# Patient Record
Sex: MALE | Race: BLACK OR AFRICAN AMERICAN | Employment: OTHER | URBAN - METROPOLITAN AREA
[De-identification: names, ages, dates, MRNs, and addresses within clinical notes are randomized per-mention and may not be internally consistent; named-entity substitution may affect disease eponyms.]

---

## 2017-04-05 ENCOUNTER — APPOINTMENT (OUTPATIENT)
Dept: CT IMAGING | Facility: HOSPITAL | Age: 72
DRG: 871 | End: 2017-04-05
Attending: EMERGENCY MEDICINE
Payer: MEDICARE

## 2017-04-05 ENCOUNTER — HOSPITAL ENCOUNTER (INPATIENT)
Facility: HOSPITAL | Age: 72
LOS: 7 days | Discharge: HOME | DRG: 871 | End: 2017-04-12
Attending: EMERGENCY MEDICINE | Admitting: HOSPITALIST
Payer: MEDICARE

## 2017-04-05 ENCOUNTER — APPOINTMENT (OUTPATIENT)
Dept: GENERAL RADIOLOGY | Facility: HOSPITAL | Age: 72
DRG: 871 | End: 2017-04-05
Attending: EMERGENCY MEDICINE
Payer: MEDICARE

## 2017-04-05 DIAGNOSIS — J11.1 INFLUENZA-LIKE ILLNESS: ICD-10-CM

## 2017-04-05 DIAGNOSIS — G93.40 ENCEPHALOPATHY: Primary | ICD-10-CM

## 2017-04-05 DIAGNOSIS — R50.9 FEBRILE ILLNESS, ACUTE: ICD-10-CM

## 2017-04-05 PROBLEM — R73.9 HYPERGLYCEMIA: Status: ACTIVE | Noted: 2017-04-05

## 2017-04-05 PROBLEM — E87.1 HYPONATREMIA: Status: ACTIVE | Noted: 2017-04-05

## 2017-04-05 PROBLEM — E87.20 METABOLIC ACIDOSIS: Status: ACTIVE | Noted: 2017-04-05

## 2017-04-05 PROBLEM — E87.2 METABOLIC ACIDOSIS: Status: ACTIVE | Noted: 2017-04-05

## 2017-04-05 PROCEDURE — 99222 1ST HOSP IP/OBS MODERATE 55: CPT | Performed by: HOSPITALIST

## 2017-04-05 PROCEDURE — 70450 CT HEAD/BRAIN W/O DYE: CPT

## 2017-04-05 PROCEDURE — 71010 XR CHEST AP PORTABLE  (CPT=71010): CPT

## 2017-04-05 RX ORDER — MULTIPLE VITAMINS W/ MINERALS TAB 9MG-400MCG
1 TAB ORAL DAILY
Status: DISCONTINUED | OUTPATIENT
Start: 2017-04-05 | End: 2017-04-12

## 2017-04-05 RX ORDER — DEXTROSE MONOHYDRATE 25 G/50ML
50 INJECTION, SOLUTION INTRAVENOUS AS NEEDED
Status: DISCONTINUED | OUTPATIENT
Start: 2017-04-05 | End: 2017-04-09

## 2017-04-05 RX ORDER — ALFUZOSIN HYDROCHLORIDE 10 MG/1
10 TABLET, EXTENDED RELEASE ORAL
Status: DISCONTINUED | OUTPATIENT
Start: 2017-04-06 | End: 2017-04-12

## 2017-04-05 RX ORDER — BISACODYL 10 MG
10 SUPPOSITORY, RECTAL RECTAL
Status: DISCONTINUED | OUTPATIENT
Start: 2017-04-05 | End: 2017-04-12

## 2017-04-05 RX ORDER — DOCUSATE SODIUM 100 MG/1
100 CAPSULE, LIQUID FILLED ORAL 2 TIMES DAILY
Status: DISCONTINUED | OUTPATIENT
Start: 2017-04-05 | End: 2017-04-12

## 2017-04-05 RX ORDER — CLOPIDOGREL BISULFATE 75 MG/1
75 TABLET ORAL DAILY
Status: DISCONTINUED | OUTPATIENT
Start: 2017-04-05 | End: 2017-04-12

## 2017-04-05 RX ORDER — ALLOPURINOL 300 MG/1
300 TABLET ORAL DAILY
Status: ON HOLD | COMMUNITY
End: 2017-08-09

## 2017-04-05 RX ORDER — SODIUM PHOSPHATE, DIBASIC AND SODIUM PHOSPHATE, MONOBASIC 7; 19 G/133ML; G/133ML
1 ENEMA RECTAL ONCE AS NEEDED
Status: ACTIVE | OUTPATIENT
Start: 2017-04-05 | End: 2017-04-05

## 2017-04-05 RX ORDER — NITROGLYCERIN 0.4 MG/1
0.4 TABLET SUBLINGUAL EVERY 5 MIN PRN
Status: DISCONTINUED | OUTPATIENT
Start: 2017-04-05 | End: 2017-04-12

## 2017-04-05 RX ORDER — HYDROCODONE BITARTRATE AND ACETAMINOPHEN 5; 325 MG/1; MG/1
1 TABLET ORAL EVERY 8 HOURS PRN
Status: ON HOLD | COMMUNITY
End: 2021-09-07

## 2017-04-05 RX ORDER — ONDANSETRON 2 MG/ML
4 INJECTION INTRAMUSCULAR; INTRAVENOUS EVERY 6 HOURS PRN
Status: DISCONTINUED | OUTPATIENT
Start: 2017-04-05 | End: 2017-04-12

## 2017-04-05 RX ORDER — 0.9 % SODIUM CHLORIDE 0.9 %
VIAL (ML) INJECTION
Status: COMPLETED
Start: 2017-04-05 | End: 2017-04-05

## 2017-04-05 RX ORDER — DEXTROSE MONOHYDRATE 25 G/50ML
INJECTION, SOLUTION INTRAVENOUS
Status: COMPLETED
Start: 2017-04-05 | End: 2017-04-05

## 2017-04-05 RX ORDER — SODIUM CHLORIDE 9 MG/ML
INJECTION, SOLUTION INTRAVENOUS CONTINUOUS
Status: DISCONTINUED | OUTPATIENT
Start: 2017-04-05 | End: 2017-04-07 | Stop reason: ALTCHOICE

## 2017-04-05 RX ORDER — OSELTAMIVIR PHOSPHATE 75 MG/1
75 CAPSULE ORAL 2 TIMES DAILY
Status: DISCONTINUED | OUTPATIENT
Start: 2017-04-05 | End: 2017-04-11

## 2017-04-05 RX ORDER — FAMOTIDINE 20 MG/1
20 TABLET ORAL 2 TIMES DAILY
Status: ON HOLD | COMMUNITY
End: 2021-09-07

## 2017-04-05 RX ORDER — SODIUM CHLORIDE 9 MG/ML
INJECTION, SOLUTION INTRAVENOUS ONCE
Status: COMPLETED | OUTPATIENT
Start: 2017-04-05 | End: 2017-04-05

## 2017-04-05 RX ORDER — FAMOTIDINE 20 MG/1
20 TABLET ORAL 2 TIMES DAILY
Status: DISCONTINUED | OUTPATIENT
Start: 2017-04-05 | End: 2017-04-07

## 2017-04-05 RX ORDER — NITROGLYCERIN 0.4 MG/1
0.4 TABLET SUBLINGUAL EVERY 5 MIN PRN
Status: ON HOLD | COMMUNITY
End: 2021-09-08

## 2017-04-05 RX ORDER — ALLOPURINOL 300 MG/1
300 TABLET ORAL DAILY
Status: DISCONTINUED | OUTPATIENT
Start: 2017-04-05 | End: 2017-04-12

## 2017-04-05 RX ORDER — ASPIRIN 81 MG/1
81 TABLET ORAL DAILY
Status: DISCONTINUED | OUTPATIENT
Start: 2017-04-05 | End: 2017-04-07

## 2017-04-05 RX ORDER — GLIPIZIDE 10 MG/1
10 TABLET ORAL
Status: ON HOLD | COMMUNITY
End: 2017-04-12

## 2017-04-05 RX ORDER — HEPARIN SODIUM 5000 [USP'U]/ML
5000 INJECTION, SOLUTION INTRAVENOUS; SUBCUTANEOUS EVERY 12 HOURS
Status: DISCONTINUED | OUTPATIENT
Start: 2017-04-05 | End: 2017-04-12

## 2017-04-05 RX ORDER — ASPIRIN 81 MG/1
81 TABLET ORAL DAILY
COMMUNITY

## 2017-04-05 RX ORDER — POLYETHYLENE GLYCOL 3350 17 G/17G
17 POWDER, FOR SOLUTION ORAL DAILY PRN
Status: DISCONTINUED | OUTPATIENT
Start: 2017-04-05 | End: 2017-04-12

## 2017-04-05 RX ORDER — OSELTAMIVIR PHOSPHATE 75 MG/1
75 CAPSULE ORAL 2 TIMES DAILY
Status: DISCONTINUED | OUTPATIENT
Start: 2017-04-05 | End: 2017-04-05

## 2017-04-05 RX ORDER — CLOPIDOGREL BISULFATE 75 MG/1
75 TABLET ORAL DAILY
Status: ON HOLD | COMMUNITY
End: 2021-09-07

## 2017-04-05 RX ORDER — SODIUM CHLORIDE 9 MG/ML
INJECTION, SOLUTION INTRAVENOUS CONTINUOUS
Status: CANCELLED | OUTPATIENT
Start: 2017-04-05 | End: 2017-04-05

## 2017-04-05 RX ORDER — 0.9 % SODIUM CHLORIDE 0.9 %
VIAL (ML) INJECTION
Status: DISPENSED
Start: 2017-04-05 | End: 2017-04-06

## 2017-04-05 RX ORDER — ACETAMINOPHEN 325 MG/1
650 TABLET ORAL EVERY 6 HOURS PRN
Status: DISCONTINUED | OUTPATIENT
Start: 2017-04-05 | End: 2017-04-12

## 2017-04-05 RX ORDER — ATORVASTATIN CALCIUM 10 MG/1
20 TABLET, FILM COATED ORAL NIGHTLY
COMMUNITY

## 2017-04-05 RX ORDER — TAMSULOSIN HYDROCHLORIDE 0.4 MG/1
0.4 CAPSULE ORAL DAILY
COMMUNITY

## 2017-04-05 RX ORDER — M-VIT,TX,IRON,MINS/CALC/FOLIC 27MG-0.4MG
1 TABLET ORAL DAILY
Status: ON HOLD | COMMUNITY
End: 2021-09-08

## 2017-04-05 RX ORDER — ATORVASTATIN CALCIUM 20 MG/1
10 TABLET, FILM COATED ORAL DAILY
Status: DISCONTINUED | OUTPATIENT
Start: 2017-04-05 | End: 2017-04-10

## 2017-04-05 NOTE — ED PROVIDER NOTES
Patient Seen in: [facility] AND [facility] Emergency Department    History   Patient presents with:  Altered Mental Status (neurologic)    Stated Complaint: ams    HPI    Patient presents the emergency department with increasing weakness, confusion and unsteady well-developed and well-nourished. No distress. HENT:   Head: Normocephalic. Eyes: Conjunctivae and EOM are normal.   Neck: Normal range of motion. Neck supple. Cardiovascular: Normal rate and regular rhythm. No murmur heard.   Pulmonary/Chest: Eff view results for these tests on the individual orders.    LACTIC ACID, PLASMA   LACTIC ACID, PLASMA   RAINBOW DRAW BLUE   RAINBOW DRAW GOLD   RAINBOW DRAW LAVENDER   RAINBOW DRAW LIGHT GREEN   RAINBOW DRAW LAVENDER TALL (BNP)   RAINBOW DRAW DARK GREEN   BLO

## 2017-04-05 NOTE — ED NOTES
Straight cath done with sterile technique for collection of urine for lab. Pt tolerated well. One attempt. Urine sent to lab.

## 2017-04-05 NOTE — ED INITIAL ASSESSMENT (HPI)
Present to ED via [facility] for evaluation of AMS/hypoglycemia, fevers at home for 2 days.  Denies pain, +non productive cough

## 2017-04-05 NOTE — H&P
[facility]    PATIENT'S NAME: Earnstine Gelineau   ATTENDING PHYSICIAN: Stephon Ramos MD   PATIENT ACCOUNT#:   [id number]    LOCATION:  [place] [address]  MEDICAL RECORD #:   [id number]       YOB: 1945  ADMISSION DATE:       04/05/2017 state and encephalopathic state.   According to the wife, the patient had a baseline dementia secondary to multiple strokes, but in the last 10 days has been sneezing and coughing and he felt warm as feverish and his gait has been unstable and he has been v

## 2017-04-06 PROCEDURE — 99233 SBSQ HOSP IP/OBS HIGH 50: CPT | Performed by: HOSPITALIST

## 2017-04-06 RX ORDER — 0.9 % SODIUM CHLORIDE 0.9 %
VIAL (ML) INJECTION
Status: DISPENSED
Start: 2017-04-06 | End: 2017-04-07

## 2017-04-06 RX ORDER — POTASSIUM CHLORIDE 20 MEQ/1
40 TABLET, EXTENDED RELEASE ORAL ONCE
Status: COMPLETED | OUTPATIENT
Start: 2017-04-06 | End: 2017-04-06

## 2017-04-06 RX ORDER — IPRATROPIUM BROMIDE AND ALBUTEROL SULFATE 2.5; .5 MG/3ML; MG/3ML
3 SOLUTION RESPIRATORY (INHALATION) 3 TIMES DAILY
Status: DISCONTINUED | OUTPATIENT
Start: 2017-04-06 | End: 2017-04-09

## 2017-04-06 NOTE — PROGRESS NOTES
[facility]    Diabetes Education  Note    Lynette Ilir Patient Status:  Inpatient   1945 MRN [id number]  Location [facility] 5SW/SE Attending Cristofer Cavazos MD  Hosp Day # 1 PCP Char Arellano 0786    Reason for Visit:  Teach

## 2017-04-06 NOTE — PLAN OF CARE
Problem: Diabetes/Glucose Control  Goal: Glucose maintained within prescribed range  INTERVENTIONS:  - Monitor Blood Glucose as ordered  - Assess for signs and symptoms of hyperglycemia and hypoglycemia  - Administer ordered medications to maintain glucose cognitive and physical deficits and behaviors that affect risk of falls.   - [place] fall precautions as indicated by assessment.  - Educate pt/family on patient safety including physical limitations  - Instruct pt to call for assistance with activity bas

## 2017-04-06 NOTE — PROGRESS NOTES
[place] [facility] - [facility]    Progress Note    Temitope Love Patient Status:  Inpatient    1945 MRN [id number]   Location [facility] 5SW/SE Attending Marline Hendrix MD   Hosp Day # 1 PCP LORY BLOCK Watson       Subjective:   Still weak, a l 6.1 04/06/2017   HGB 12.4* 04/06/2017   HCT 36.4* 04/06/2017    04/06/2017   CREATSERUM 1.33 04/06/2017   BUN 18 04/06/2017   * 04/06/2017   K 3.8 04/06/2017    04/06/2017   CO2 18* 04/06/2017   * 04/06/2017   CA 8.7 04/06/2017

## 2017-04-06 NOTE — DISCHARGE PLANNING
ALISHA met with the pt and wife for initial assessment. Pt lives with the wife in a house with 5 step entry, 5 interior stairs. Pt has a cane and walker, but doesn't usually use either one. Wife does the driving, medications. Pt is on O2 which is new.  If this

## 2017-04-06 NOTE — PLAN OF CARE
Problem: Diabetes/Glucose Control  Goal: Glucose maintained within prescribed range  INTERVENTIONS:  - Monitor Blood Glucose as ordered  - Assess for signs and symptoms of hyperglycemia and hypoglycemia  - Administer ordered medications to maintain glucose Assistance in fall and injury prevention.  Call light within reach

## 2017-04-06 NOTE — PHYSICAL THERAPY NOTE
PHYSICAL THERAPY EVALUATION - INPATIENT     Room Number: 555/555-A  Evaluation Date: 4/6/2017  Type of Evaluation: Initial  Physician Order: PT Eval and Treat    Presenting Problem:  (Encephalopathy, Influenza, Febrile Illness)  Reason for Therapy:  Mob deficits in preparation for discharge. Recommend Home PT upon DC from hospital to maximize functional potential and achieve prior level of function. Recommend RW for home, will issue RW upon DC. Recommend 24 hour supervision/assist upon DC.  Per pt's wife, Dementia        Past Surgical History      Past Surgical History    CATH BARE METAL STENT (BMS)      BACK SURGERY         HOME SITUATION  Type of Home: House   Home Layout: One level  Stairs to Enter : 5  Railing: Yes          Lives With: Spouse;Daughter blankets)?: None   -   Sitting down on and standing up from a chair with arms (e.g., wheelchair, bedside commode, etc.): A Little   -   Moving from lying on back to sitting on the side of the bed?: A Little   How much help from another person does the monae Goal #6  Current Status

## 2017-04-07 ENCOUNTER — APPOINTMENT (OUTPATIENT)
Dept: GENERAL RADIOLOGY | Facility: HOSPITAL | Age: 72
DRG: 871 | End: 2017-04-07
Attending: INTERNAL MEDICINE
Payer: MEDICARE

## 2017-04-07 ENCOUNTER — APPOINTMENT (OUTPATIENT)
Dept: CV DIAGNOSTICS | Facility: HOSPITAL | Age: 72
DRG: 871 | End: 2017-04-07
Attending: INTERNAL MEDICINE
Payer: MEDICARE

## 2017-04-07 ENCOUNTER — APPOINTMENT (OUTPATIENT)
Dept: GENERAL RADIOLOGY | Facility: HOSPITAL | Age: 72
DRG: 871 | End: 2017-04-07
Attending: HOSPITALIST
Payer: MEDICARE

## 2017-04-07 PROCEDURE — 71010 XR CHEST AP PORTABLE  (CPT=71010): CPT

## 2017-04-07 PROCEDURE — 99291 CRITICAL CARE FIRST HOUR: CPT | Performed by: HOSPITALIST

## 2017-04-07 PROCEDURE — 5A09457 ASSISTANCE WITH RESPIRATORY VENTILATION, 24-96 CONSECUTIVE HOURS, CONTINUOUS POSITIVE AIRWAY PRESSURE: ICD-10-PCS | Performed by: HOSPITALIST

## 2017-04-07 PROCEDURE — 93306 TTE W/DOPPLER COMPLETE: CPT | Performed by: INTERNAL MEDICINE

## 2017-04-07 PROCEDURE — 93306 TTE W/DOPPLER COMPLETE: CPT

## 2017-04-07 PROCEDURE — 3E04329 INTRODUCTION OF OTHER ANTI-INFECTIVE INTO CENTRAL VEIN, PERCUTANEOUS APPROACH: ICD-10-PCS | Performed by: HOSPITALIST

## 2017-04-07 PROCEDURE — 99222 1ST HOSP IP/OBS MODERATE 55: CPT | Performed by: INTERNAL MEDICINE

## 2017-04-07 PROCEDURE — 02HV33Z INSERTION OF INFUSION DEVICE INTO SUPERIOR VENA CAVA, PERCUTANEOUS APPROACH: ICD-10-PCS | Performed by: HOSPITALIST

## 2017-04-07 RX ORDER — ASPIRIN 300 MG
300 SUPPOSITORY, RECTAL RECTAL DAILY
Status: DISCONTINUED | OUTPATIENT
Start: 2017-04-07 | End: 2017-04-10

## 2017-04-07 RX ORDER — ASPIRIN 81 MG/1
325 TABLET, CHEWABLE ORAL DAILY
Status: DISCONTINUED | OUTPATIENT
Start: 2017-04-07 | End: 2017-04-07

## 2017-04-07 RX ORDER — MORPHINE SULFATE 2 MG/ML
INJECTION, SOLUTION INTRAMUSCULAR; INTRAVENOUS
Status: DISPENSED
Start: 2017-04-07 | End: 2017-04-08

## 2017-04-07 RX ORDER — FAMOTIDINE 20 MG/1
20 TABLET ORAL DAILY
Status: DISCONTINUED | OUTPATIENT
Start: 2017-04-08 | End: 2017-04-12

## 2017-04-07 RX ORDER — MORPHINE SULFATE 2 MG/ML
2 INJECTION, SOLUTION INTRAMUSCULAR; INTRAVENOUS ONCE
Status: COMPLETED | OUTPATIENT
Start: 2017-04-07 | End: 2017-04-07

## 2017-04-07 RX ORDER — SODIUM CHLORIDE, SODIUM LACTATE, POTASSIUM CHLORIDE, CALCIUM CHLORIDE 600; 310; 30; 20 MG/100ML; MG/100ML; MG/100ML; MG/100ML
INJECTION, SOLUTION INTRAVENOUS
Status: DISPENSED
Start: 2017-04-07 | End: 2017-04-08

## 2017-04-07 RX ORDER — SODIUM CHLORIDE 9 MG/ML
INJECTION, SOLUTION INTRAVENOUS CONTINUOUS
Status: DISCONTINUED | OUTPATIENT
Start: 2017-04-07 | End: 2017-04-08

## 2017-04-07 RX ORDER — ASPIRIN 81 MG/1
81 TABLET, CHEWABLE ORAL DAILY
Status: DISCONTINUED | OUTPATIENT
Start: 2017-04-07 | End: 2017-04-07

## 2017-04-07 RX ORDER — SODIUM CHLORIDE 0.9 % (FLUSH) 0.9 %
10 SYRINGE (ML) INJECTION AS NEEDED
Status: DISCONTINUED | OUTPATIENT
Start: 2017-04-07 | End: 2017-04-12

## 2017-04-07 RX ORDER — SODIUM CHLORIDE 9 MG/ML
INJECTION, SOLUTION INTRAVENOUS CONTINUOUS
Status: DISCONTINUED | OUTPATIENT
Start: 2017-04-07 | End: 2017-04-12

## 2017-04-07 RX ORDER — SODIUM CHLORIDE 9 MG/ML
INJECTION, SOLUTION INTRAVENOUS
Status: DISPENSED
Start: 2017-04-07 | End: 2017-04-08

## 2017-04-07 NOTE — PHYSICAL THERAPY NOTE
Attempted to see patient for PT but per RN, pt was having chest pain and RRT was called. RRT is working with patient at this time. Pt is not medically appropriate to be seen for PT at this time. Will hold PT for today. Will f/u tomorrow as needed.

## 2017-04-07 NOTE — PLAN OF CARE
Problem: Diabetes/Glucose Control  Goal: Glucose maintained within prescribed range  INTERVENTIONS:  - Monitor Blood Glucose as ordered  - Assess for signs and symptoms of hyperglycemia and hypoglycemia  - Administer ordered medications to maintain glucose physical limitations  - Instruct pt to call for assistance with activity based on assessment  - Modify environment to reduce risk of injury  - Provide assistive devices as appropriate  - Consider OT/PT consult to assist with strengthening/mobility  - Encou

## 2017-04-07 NOTE — PROGRESS NOTES
[facility]    Cardiology Consultation  [facility]    Amalia Trujillo Patient Status:  Inpatient    1945 MRN [id number]   Location [facility] 2W/SW Attending Keanu Simmons MD   Hosp Day # 2 PCP LORY • Cancer Mother      uterine   • Cancer Sister    • Diabetes Sister    • Cancer Brother    • Cancer Brother        Social History  Patient Guardian Status:  Not on file.     Other Topics            Concern    None on file    Social History Narrative    N (UROXATRAL) 24 hr tab 10 mg 10 mg Oral Daily with breakfast   multivitamin with minerals (ADULT) tab 1 tablet 1 tablet Oral Daily   dextrose injection 50 mL 50 mL Intravenous PRN   Glucose-Vitamin C (DEX-4) 4-0.006 g chewable tab 4 tablet 4 tablet Oral Q15 75 mg Oral BID   • Heparin Sodium (Porcine)  5,000 Units Subcutaneous Q12H   • docusate sodium  100 mg Oral BID   • allopurinol  300 mg Oral Daily   • aspirin  81 mg Oral Daily   • Atorvastatin Calcium  10 mg Oral Daily   • Clopidogrel Bisulfate  75 mg Vianey Meyer CONCLUSION:  1. Left PICC line with tip near are SVC junction (approximately 1.2 cm into the right atrium). 2. Suboptimal inspiration with vascular crowding and minimal basilar atelectasis.          Xr Chest Ap Portable  (cpt=71010)    4/7/2017  CONCLUSION:

## 2017-04-07 NOTE — OCCUPATIONAL THERAPY NOTE
OCCUPATIONAL THERAPY EVALUATION - INPATIENT     Room Number: 555/555-A  Evaluation Date: 4/7/2017  Type of Evaluation: Initial  Presenting Problem:  (Encephalopathy, Acute Bronchitis)    Physician Order: IP Consult to Occupational Therapy  Reason for YESENIA R [facility] pain      neuropathic pain of both legs   • GERD (gastroesophageal reflux disease)      gerd   • Stroke [facility])    • Gout      chronic gout   • Fatigue      chronic fatigue   • High cholesterol    • High blood pressure    • Back problem    • Incontinence washing, rinsing, drying)?: A Little  -   Toileting, which includes using toilet, bedpan or urinal? : A Little  -   Putting on and taking off regular upper body clothing?: A Little  -   Taking care of personal grooming such as brushing teeth?: A Little  -

## 2017-04-07 NOTE — PROGRESS NOTES
PICC Line Insertion Note    Procedure: Insertion of # 5 FR / Triple Power Solo    Indications:  Poor Access    Procedure Details   Patient and/or significant others educated regarding insertion of PICC line, rationale for procedure, and after care.  Informe

## 2017-04-07 NOTE — PROGRESS NOTES
[facility]    Progress Note    Lynette Ayala Patient Status:  Inpatient    1945 MRN [id number]   Location [facility] 5SW/SE Attending Mary Evans MD   [facility]       Subjective:    The patient was of CAD, send troponins    Acute bronchitis   Influenza A syndrome  Acute hypoxemic respiratory failure, currently on 3 L of oxygen  Of note, patient was vaccinated this year  -Continue Tamiflu  -Isolation  - nebulizers  -Wean off oxygen    ARF due to dehyd

## 2017-04-07 NOTE — SEPSIS REASSESSMENT
[facility]    Sepsis Reassessment Note    /65 mmHg  Pulse 120  Temp(Src) 98.5 °F (36.9 °C) (Oral)  Resp 25  Ht 5' 8\" (1.727 m)  Wt 179 lb 1.6 oz (81.239 kg)  BMI 27.24 kg/m2  SpO2 100%     2:28 PM    Cardiac:  Regularity: Regular

## 2017-04-07 NOTE — PROGRESS NOTES
RRT    *See RRT Documentation Record*    Reason the RRT was called: 1110  Assessment of patient leading up to RRT: chest pain. Interventions/Testing: nitroglycerin given.  Patient states he felt better with one time dose but HR increased from 110's to 140

## 2017-04-07 NOTE — CONSULTS
[facility]    Report of Consultation    Valerie Alatorre Patient Status:  Inpatient    1945 MRN [id number]   Location [facility] 2W/SW Attending Ann Hoffmann MD   Hosp Day # 2 PCP Char Arellano 7450     Date of Admission:   NaCl infusion  Intravenous Continuous   azithromycin (ZITHROMAX) 500 mg in sodium chloride 0.9 % 250 mL IVPB add-vantage 500 mg Intravenous Q24H   Normal Saline Flush 0.9 % injection 10 mL 10 mL Intravenous PRN   ipratropium-albuterol (DUONEB) nebulizer so times daily. glipiZIDE 10 MG Oral Tab Take 10 mg by mouth 2 (two) times daily before meals. losartan 100 MG TABS 100 mg, hydrochlorothiazide 25 MG TABS 25 mg Take 1 tablet by mouth daily. tamsulosin HCl 0.4 MG Oral Cap Take 0.4 mg by mouth daily.    Valeta Rank TECHNIQUE: CT images were obtained without contrast material.  Automated exposure control for dose reduction was used. FINDINGS:  CSF SPACES: There is ex vacuo dilatation of the lateral and 3rd ventricles.   The remaining ventricles,  cisterns, and sulci MEDIAST/WARREN:   No visible mass or adenopathy. LUNGS/PLEURA: Suboptimal inspiration with vascular crowding and probable basilar atelectasis. BONES: No fracture or visible bony lesion.  OTHER: Left PICC line with tip protruding into the right atrium by about No effusion or pleural thickening. BONES: No fracture or visible bony lesion. OTHER: Negative.    Dictated by (CST): Maureen James MD on 4/05/2017 at 13:06     Approved by (CST): Maureen James MD on 4/05/2017 at 13:07          4/5/2017  CONCLUSION: No acute c

## 2017-04-08 ENCOUNTER — APPOINTMENT (OUTPATIENT)
Dept: GENERAL RADIOLOGY | Facility: HOSPITAL | Age: 72
DRG: 871 | End: 2017-04-08
Attending: INTERNAL MEDICINE
Payer: MEDICARE

## 2017-04-08 PROCEDURE — 99233 SBSQ HOSP IP/OBS HIGH 50: CPT | Performed by: INTERNAL MEDICINE

## 2017-04-08 PROCEDURE — 71010 XR CHEST AP PORTABLE  (CPT=71010): CPT

## 2017-04-08 RX ORDER — POTASSIUM CHLORIDE 20 MEQ/1
40 TABLET, EXTENDED RELEASE ORAL ONCE
Status: COMPLETED | OUTPATIENT
Start: 2017-04-08 | End: 2017-04-08

## 2017-04-08 NOTE — PROGRESS NOTES
[facility] dosing service    Initial Pharmacokinetic Consult for Vancomycin Dosing     Kathya Cervantes is a 80year old male admitted on 4/5/17 who is being treated for sepsis.   Pharmacy has been asked to dose Vancomycin by Dr. Neha Brannon    He has No Known Al Single view.       FINDINGS:   CARDIAC/VASC: No cardiac silhouette abnormality or cardiomegaly.  Mild central vascular congestion. MEDIAST/WARREN:   No visible mass or adenopathy.   LUNGS/PLEURA: No significant pulmonary parenchymal abnormalities.  No effus

## 2017-04-08 NOTE — PHYSICAL THERAPY NOTE
Chart reviewed    Talked with ASHER Mckeon   Pt with RRT and transfer to ICU   4/07  With chest pain  Sepsis  Tachycardia and ARF   Pt presently on BIPAP and not approp for therapy today.    Per RN pls reattempt 4/09/17

## 2017-04-08 NOTE — PROGRESS NOTES
[facility]OSP - [facility]    Cardiology - [facility]  Progress Note    Smooth Ashing Patient Status:  Inpatient    1945 MRN [id number]   Location [facility] 2W/SW Attending Huseyin Hrat MD   Hosp Day # 3 PCP LORY Dempsey calm    Results:     Lab Results  Component Value Date   WBC 17.9* 04/08/2017   HGB 10.7* 04/08/2017   HCT 31.9* 04/08/2017    04/08/2017   CREATSERUM 1.46 04/08/2017   BUN 17 04/08/2017    04/08/2017   K 3.8 04/08/2017    04/08/2017   C

## 2017-04-08 NOTE — PLAN OF CARE
Diabetes/Glucose Control    • Glucose maintained within prescribed range Progressing        PAIN - ADULT    • Verbalizes/displays adequate comfort level or patient's stated pain goal Progressing        Patient Centered Care    • Patient preferences are [place]

## 2017-04-08 NOTE — PROGRESS NOTES
Pulmonary/Critical Care Follow Up Note    HPI:   Gisselle Iqbal is a 80year old male with Patient presents with:  Altered Mental Status (neurologic)      PCP Char Arellano 9631  Admission Attending Kenya Casey MD    Hospital Day #3    No pain  No sob  No fev PRN  •  docusate sodium (COLACE) cap 100 mg, 100 mg, Oral, BID  •  PEG 3350 (MIRALAX) powder packet 17 g, 17 g, Oral, Daily PRN  •  magnesium hydroxide (MILK OF MAGNESIA) 400 MG/5ML suspension 30 mL, 30 mL, Oral, Daily PRN  •  bisacodyl (DULCOLAX) rectal s care    3.  Lactic acidosis  Resolved    4. DVT px  SQ hep    5.  GI px  H2 blocker    EOL  Full code      Nuria Harris MD

## 2017-04-08 NOTE — PROGRESS NOTES
[facility] Pharmacy Note:  Renal Dose Adjustment    Wayne  has been prescribed famotidine (PEPCID) 20 mg orally every 12 hours. Estimated Creatinine Clearance: 48.5 mL/min (based on Cr of 1.37).     His calculated creatinine clearance is <50 ml/min, theref

## 2017-04-09 PROCEDURE — 99232 SBSQ HOSP IP/OBS MODERATE 35: CPT | Performed by: INTERNAL MEDICINE

## 2017-04-09 PROCEDURE — 99233 SBSQ HOSP IP/OBS HIGH 50: CPT | Performed by: HOSPITALIST

## 2017-04-09 RX ORDER — ALBUTEROL SULFATE 2.5 MG/3ML
2.5 SOLUTION RESPIRATORY (INHALATION)
Status: DISCONTINUED | OUTPATIENT
Start: 2017-04-09 | End: 2017-04-11

## 2017-04-09 RX ORDER — DEXTROSE MONOHYDRATE 25 G/50ML
50 INJECTION, SOLUTION INTRAVENOUS AS NEEDED
Status: DISCONTINUED | OUTPATIENT
Start: 2017-04-09 | End: 2017-04-12

## 2017-04-09 RX ORDER — POTASSIUM CHLORIDE 20 MEQ/1
40 TABLET, EXTENDED RELEASE ORAL ONCE
Status: COMPLETED | OUTPATIENT
Start: 2017-04-09 | End: 2017-04-09

## 2017-04-09 RX ORDER — CARVEDILOL 3.12 MG/1
3.12 TABLET ORAL 2 TIMES DAILY WITH MEALS
Status: DISCONTINUED | OUTPATIENT
Start: 2017-04-09 | End: 2017-04-11

## 2017-04-09 RX ORDER — POLYVINYL ALCOHOL 14 MG/ML
1 SOLUTION/ DROPS OPHTHALMIC 4 TIMES DAILY PRN
Status: DISCONTINUED | OUTPATIENT
Start: 2017-04-09 | End: 2017-04-12

## 2017-04-09 NOTE — PROGRESS NOTES
Pulmonary/Critical Care Follow Up Note    HPI:   Valerie Alatorre is a 80year old male with Patient presents with:  Altered Mental Status (neurologic)      PCP Char Arellano 3401  Admission Attending Hafsa Zamora 15 Day #4    No pain  No sob  No fev Daily PRN  •  bisacodyl (DULCOLAX) rectal suppository 10 mg, 10 mg, Rectal, Daily PRN  •  allopurinol (ZYLOPRIM) tab 300 mg, 300 mg, Oral, Daily  •  Atorvastatin Calcium (LIPITOR) tab 10 mg, 10 mg, Oral, Daily  •  Clopidogrel Bisulfate (PLAVIX) tab 75 mg,

## 2017-04-09 NOTE — PROGRESS NOTES
[place] [facility] - [facility]    Progress Note    Crystal Ya Patient Status:  Inpatient    1945 MRN [id number]   Location [facility] 5SW/SE Attending Rambo Gilliland MD   [facility]       Subjective:     Lethargic, se for strict I&O    Dementia.   -Supportive    Hypertension.  -Monitor, stable    Dispo:  PCU, d/w Dr. Sydni Marks, d/w pt and dtr at bedside         Results:       Lab Results  Component Value Date   WBC 17.9* 04/08/2017   HGB 10.7* 04/08/2017   HCT 31.9* 04/08/20

## 2017-04-09 NOTE — PROGRESS NOTES
Patient alert and oriented to self. Denies SOB, N/V, pain. C/o dryness, itchiness to both eyes. NSR on monitor. VS WNL. Kept on 4 L NC for most of the day. Breathing comfortably. Due medications administered as ordered. BM x1 this morning.   Blood s

## 2017-04-10 PROCEDURE — 99233 SBSQ HOSP IP/OBS HIGH 50: CPT | Performed by: HOSPITALIST

## 2017-04-10 PROCEDURE — 99232 SBSQ HOSP IP/OBS MODERATE 35: CPT | Performed by: INTERNAL MEDICINE

## 2017-04-10 RX ORDER — HALOPERIDOL 5 MG/ML
2 INJECTION INTRAMUSCULAR EVERY 4 HOURS PRN
Status: DISCONTINUED | OUTPATIENT
Start: 2017-04-10 | End: 2017-04-12

## 2017-04-10 RX ORDER — ATORVASTATIN CALCIUM 10 MG/1
10 TABLET, FILM COATED ORAL DAILY
Status: DISCONTINUED | OUTPATIENT
Start: 2017-04-11 | End: 2017-04-12

## 2017-04-10 RX ORDER — HALOPERIDOL 5 MG/ML
INJECTION INTRAMUSCULAR
Status: COMPLETED
Start: 2017-04-10 | End: 2017-04-10

## 2017-04-10 RX ORDER — HALOPERIDOL 5 MG/ML
5 INJECTION INTRAMUSCULAR ONCE
Status: COMPLETED | OUTPATIENT
Start: 2017-04-10 | End: 2017-04-10

## 2017-04-10 RX ORDER — ASPIRIN 81 MG/1
81 TABLET ORAL DAILY
Status: DISCONTINUED | OUTPATIENT
Start: 2017-04-11 | End: 2017-04-12

## 2017-04-10 NOTE — PHYSICAL THERAPY NOTE
PHYSICAL THERAPY TREATMENT NOTE - INPATIENT    Room Number: 212/212-A       Presenting Problem:  (Encephalopathy, Influenza, Febrile Illness)    Problem List  Principal Problem:    Encephalopathy  Active Problems:    Hyponatremia    Hyperglycemia    Metab PAIN ASSESSMENT   Ratin  Location:  (Denies pain and seemed comfortable during PT)       BALANCE                                                                                                                     Static Sitting: Fair +  Dynamic Sit posture   Progressed to stand with RW   Stood briefly    Returned to sit for rest    After rest with SPT with RW to chair in room       D.c planning with family iniitated          Patient End of Session: Up in chair;Needs met;Call light within reach;RN liz

## 2017-04-10 NOTE — PROGRESS NOTES
Report given to [facility]. Medications sent to floor via tube system. Pt accompanied by daughter.

## 2017-04-10 NOTE — DIETARY NOTE
ADULT NUTRITION INITIAL ASSESSMENT    Pt is at moderate nutrition risk. Pt does not meet malnutrition criteria.         RECOMMENDATIONS TO MD:  Recommendations to MD: liberalize diet to general low salt with Supplements TID in view of inadequate nutrition prostatic hyperplasia)      bph   • Neuropathic pain      neuropathic pain of both legs   • GERD (gastroesophageal reflux disease)      gerd   • Stroke [facility])    • Gout      chronic gout   • Fatigue      chronic fatigue   • High cholesterol    • HTN    • Ba adjust supplements to sugar free.     - Anthropometric Measurement:      Monitor: wt and wt change     - Nutrition Goals:      maintain wt within 5%, PO and supplement greater than 75% of needs, good supplement intake, labs WNL and euglycemia      DIETITIAN

## 2017-04-10 NOTE — PROGRESS NOTES
[facility]  Cardiology Progress Note    José Antonio Leal Patient Status:  Inpatient    1945 MRN [id number]   Location [facility] 2W/SW Attending Chandrika Daniels MD   Hosp Day # 5 PCP [facility]       Assessment and Plan: Influenza 04/08/17   0621  04/09/17   0512  04/10/17   0511   NA  136   --   137   K  3.8  3.8  4.4  4.4   CL  107   --   109   CO2  19*   --   20*   BUN  17   --   16   CREATSERUM  1.46   --   1.40   CA  8.3*   --   8.3*   GLU  131*   --   115*       Recent Labs 10 mg 10 mg Rectal Daily PRN   allopurinol (ZYLOPRIM) tab 300 mg 300 mg Oral Daily   Clopidogrel Bisulfate (PLAVIX) tab 75 mg 75 mg Oral Daily   nitroGLYCERIN (NITROSTAT) SL tab 0.4 mg 0.4 mg Sublingual Q5 Min PRN   Alfuzosin HCl ER (UROXATRAL) 24 hr tab 1

## 2017-04-10 NOTE — PROGRESS NOTES
Pulmonary/Critical Care Follow Up Note    HPI:   Kathya Cervantes is a 80year old male with Patient presents with:  Altered Mental Status (neurologic)      PCP Char Arellano 9581  Admission Attending Hafsa Molina 15 Day #5    No pain  No SOB  No fev Phosphate (TAMIFLU) cap 75 mg, 75 mg, Oral, BID  •  Heparin Sodium (Porcine) 5000 UNIT/ML injection 5,000 Units, 5,000 Units, Subcutaneous, Q12H  •  acetaminophen (TYLENOL) tab 650 mg, 650 mg, Oral, Q6H PRN  •  ondansetron HCl (ZOFRAN) injection 4 mg, 4 mg of dementia  Better  Plan supportive care    3.  Lactic acidosis  Resolved    4. DVT px  SQ hep    5.  GI px  H2 blocker    EOL  Full code      Floor today      Roslyn Moore MD

## 2017-04-10 NOTE — PROGRESS NOTES
[place] [facility] - [facility]    Progress Note    Lynette Hazel Patient Status:  Inpatient    1945 MRN [id number]   Location [facility] 5SW/SE Attending Cristofer Cavazos MD   Hosp Day # 5 PCP LORY HORN       Subjective:      more comfort functions in the morning  -[place] for strict I&O    Dementia. -Supportive    Hypertension.  -Monitor, stable    Dispo:    Ok to floor, d/w pt, wife and dtr at bedside  Greater than 35 minutes spent, >50% spent counseling and coordinating of care as outlined

## 2017-04-11 ENCOUNTER — APPOINTMENT (OUTPATIENT)
Dept: GENERAL RADIOLOGY | Facility: HOSPITAL | Age: 72
DRG: 871 | End: 2017-04-11
Attending: HOSPITALIST
Payer: MEDICARE

## 2017-04-11 PROCEDURE — 99232 SBSQ HOSP IP/OBS MODERATE 35: CPT | Performed by: INTERNAL MEDICINE

## 2017-04-11 PROCEDURE — 71020 XR CHEST PA + LAT CHEST (CPT=71020): CPT

## 2017-04-11 PROCEDURE — 99233 SBSQ HOSP IP/OBS HIGH 50: CPT | Performed by: HOSPITALIST

## 2017-04-11 RX ORDER — CARVEDILOL 6.25 MG/1
6.25 TABLET ORAL 2 TIMES DAILY WITH MEALS
Status: DISCONTINUED | OUTPATIENT
Start: 2017-04-11 | End: 2017-04-12

## 2017-04-11 RX ORDER — RISPERIDONE 0.5 MG/1
0.5 TABLET, FILM COATED ORAL NIGHTLY
Status: DISCONTINUED | OUTPATIENT
Start: 2017-04-11 | End: 2017-04-12

## 2017-04-11 RX ORDER — ALBUTEROL SULFATE 2.5 MG/3ML
2.5 SOLUTION RESPIRATORY (INHALATION) EVERY 6 HOURS PRN
Status: DISCONTINUED | OUTPATIENT
Start: 2017-04-11 | End: 2017-04-12

## 2017-04-11 NOTE — PHYSICAL THERAPY NOTE
PHYSICAL THERAPY TREATMENT NOTE - INPATIENT    Room Number: 212/212-A       Presenting Problem:  (Encephalopathy, Influenza, Febrile Illness)    Problem List  Principal Problem:    Encephalopathy  Active Problems:    Hyponatremia    Hyperglycemia    Metab sheets and blankets)?: None   -   Sitting down on and standing up from a chair with arms (e.g., wheelchair, bedside commode, etc.): A Little   -   Moving from lying on back to sitting on the side of the bed?: A Little   How much help from another person do VC for safety    Goal #3  Patient is able to ambulate 100 feet with assist device: walker - rolling at assistance level: CGA to SBAx1    Goal #3    Current Status   NOT appropriate decrease tolerance    Goal #4  Patient will negotiate 5 steps with HR/CGAx1

## 2017-04-11 NOTE — RESPIRATORY THERAPY NOTE
Noninvasive Ventilation:  Pt requires acute noninvasive ventilation: As needed  Mode: BiPAP 12/5  Tolerating: Well    Oxygen requirement:  Pt requires oxygen therapy: Yes  FiO2: 30%    VAZQUEZ/CPAP:   Pt indicated for nightly CPAP: No    Nebulized medication:

## 2017-04-11 NOTE — PROGRESS NOTES
[facility]    Progress Note    Jimbo Adam Patient Status:  Inpatient    1945 MRN [id number]   Location [facility] 5SW/SE Attending Anselmo Vieira MD   Hosp Day # 6 PCP LORY BLOCK Caret       Subjective:     no fevers, no course  -Isolation  - nebulizers  -Wean off oxygen, BiPAP prn    ARF due to dehydration, history of renal insufficiency stage 2  Improved with IV fluids  -Renal functions in the morning  -[place] for strict I&O    Dementia.   -Supportive  -We will try Jamaal

## 2017-04-11 NOTE — PROGRESS NOTES
[facility]     Progress Note        Crystal Ya Patient Status:  Inpatient    1945 MRN [id number]   Location [facility] 5SW/SE Attending Rambo Gilliland MD   [facility]       Subjective:   Patient se acetaminophen (TYLENOL) tab 650 mg 650 mg Oral Q6H PRN   ondansetron HCl (ZOFRAN) injection 4 mg 4 mg Intravenous Q6H PRN   docusate sodium (COLACE) cap 100 mg 100 mg Oral BID   PEG 3350 (MIRALAX) powder packet 17 g 17 g Oral Daily PRN   magnesium hydrox patient sitting up on a cart. FINDINGS:   CARDIAC: Normal.  No cardiac silhouette abnormality or cardiomegaly. MEDIASTINUM/AORTA: Mild mediastinal fullness, stable, may be related to patient body habitus.  Kimmie appear normal. VASCULARITY: Normal. LUNGS/P unremarkable. OTHER: There is calcific atherosclerosis within the cavernous segments of the internal carotid arteries and in the vertebrobasilar system.   Dictated by (CST): Keith Saleem MD on 4/05/2017 at 14:35     Approved by (CST): Keith Saleem MD on 4 BONES: No fracture or visible bony lesion. OTHER: Left PICC line RASVC junction. Dictated by (CST): Obdulia Kebede MD on 4/07/2017 at 15:48     Approved by (CST): Obdulia Kebede MD on 4/07/2017 at 15:49          4/7/2017  CONCLUSION:  1.  Left PICC line wi lesion. OTHER: Negative. Dictated by (CST): Maliha Joiner MD on 4/07/2017 at 11:40     Approved by (CST): Maliha Joiner MD on 4/07/2017 at 11:42          4/7/2017  CONCLUSION: Mild central vascular congestion.        Xr Chest Ap Portable  (cpt=71010)    4/5

## 2017-04-11 NOTE — PROGRESS NOTES
[place] [facility] - [facility]    Cardiology Progress Note  [facility]    Colletta Boron Patient Status:  Inpatient    1945 MRN [id number]   Location [facility] 5SW/SE Attending Jake Cook MD   Hosp Day # 6 PCP IRM HGB 11.0* 04/10/2017   HCT 33.0* 04/10/2017    04/10/2017   CREATSERUM 1.40 04/10/2017   BUN 16 04/10/2017    04/10/2017   K 4.4 04/10/2017   K 4.4 04/10/2017    04/10/2017   CO2 20* 04/10/2017   * 04/10/2017   CA 8.3* 04/10/201

## 2017-04-11 NOTE — PLAN OF CARE
Problem: Diabetes/Glucose Control  Goal: Glucose maintained within prescribed range  INTERVENTIONS:  - Monitor Blood Glucose as ordered  - Assess for signs and symptoms of hyperglycemia and hypoglycemia  - Administer ordered medications to maintain glucose Identify cognitive and physical deficits and behaviors that affect risk of falls.   - [place] fall precautions as indicated by assessment.  - Educate pt/family on patient safety including physical limitations  - Instruct pt to call for assistance with act oxygenation and minimize respiratory effort  - Oxygen supplementation based on oxygen saturation or ABGs  - Provide Smoking Cessation handout, if applicable  - Encourage broncho-pulmonary hygiene including cough, deep breathe, Incentive Spirometry  - Asses

## 2017-04-11 NOTE — PLAN OF CARE
Problem: Diabetes/Glucose Control  Goal: Glucose maintained within prescribed range  INTERVENTIONS:  - Monitor Blood Glucose as ordered  - Assess for signs and symptoms of hyperglycemia and hypoglycemia  - Administer ordered medications to maintain glucose within reach

## 2017-04-12 VITALS
WEIGHT: 172.69 LBS | OXYGEN SATURATION: 97 % | HEIGHT: 68 IN | HEART RATE: 103 BPM | TEMPERATURE: 98 F | BODY MASS INDEX: 26.17 KG/M2 | DIASTOLIC BLOOD PRESSURE: 97 MMHG | RESPIRATION RATE: 20 BRPM | SYSTOLIC BLOOD PRESSURE: 131 MMHG

## 2017-04-12 PROCEDURE — 99239 HOSP IP/OBS DSCHRG MGMT >30: CPT | Performed by: HOSPITALIST

## 2017-04-12 RX ORDER — GLIPIZIDE 10 MG/1
5 TABLET ORAL
Qty: 60 TABLET | Refills: 0 | Status: ON HOLD | OUTPATIENT
Start: 2017-04-12 | End: 2021-09-08

## 2017-04-12 RX ORDER — POTASSIUM CHLORIDE 20 MEQ/1
40 TABLET, EXTENDED RELEASE ORAL ONCE
Status: COMPLETED | OUTPATIENT
Start: 2017-04-12 | End: 2017-04-12

## 2017-04-12 NOTE — PLAN OF CARE
Problem: Diabetes/Glucose Control  Goal: Glucose maintained within prescribed range  INTERVENTIONS:  - Monitor Blood Glucose as ordered  - Assess for signs and symptoms of hyperglycemia and hypoglycemia  - Administer ordered medications to maintain glucose Educate pt/family on patient safety including physical limitations  - Instruct pt to call for assistance with activity based on assessment  - Modify environment to reduce risk of injury  - Provide assistive devices as appropriate  - Consider OT/PT consult including cough, deep breathe, Incentive Spirometry  - Assess the need for suctioning and perform as needed  - Assess and instruct to report SOB or any respiratory difficulty  - Respiratory Therapy support as indicated  - Manage/alleviate anxiety  - [place]

## 2017-04-12 NOTE — HOME CARE LIAISON
MET WITH PTNT AND WIFE TO DISCUSS HOME HEALTH SERVICES AND COVERAGE CRITERIA. PTNT/WIFE/FAMILY AGREEABLE TO [facility] [[email or web address] PTNT GIVEN RESIDENTIAL BROCHURE AND CONTACT INFORMATION. Nghia RN/PT/OT.   PTNT ADMITTED AT

## 2017-04-12 NOTE — DISCHARGE PLANNING
SW was informed that pt is able to discharge home today. ALISHA informed [facility] of anticipated discharge. [facility] orders are in. Wife requested Katie Gwynn for pt. ALISHA contacted [facility] for Medicar at 3p - RN aware.     Margaux Daniels, [address]

## 2017-04-12 NOTE — CM/SW NOTE
Met with patient and wife at bedside to explain the BPCI/Medicare program. Patient  And wife agreed with phone follow up for 3 months from [address] [place] [address] after discharge from [facility]. Patient was enrolled under DRG   871.  BPCI/Medicare letter and brochure pro

## 2017-04-13 ENCOUNTER — TELEPHONE (OUTPATIENT)
Dept: MEDSURG UNIT | Facility: HOSPITAL | Age: 72
End: 2017-04-13

## 2017-04-26 NOTE — DISCHARGE SUMMARY
[facility] HOSPITALIST  DISCHARGE SUMMARY     Valerie Alatorre Patient Status:  Inpatient    1945 MRN [id number]   Location [facility] 5SW/SE Attending No att. providers found   [facility] Day # 7 PCP LORY KAHN     DATE OF ADMISSION: 2017  DATE intact distal pulses. No gallop, rub, murmur. Pulm: Effort and breath sounds normal. No distress, wheezes, rales, rhonchi. Abd: Soft, NTND, BS normal, no mass, no HSM, no rebound/guarding. Neuro: Normal reflexes, CN.  Sensory/motor exams grossly normal time this was given:  4/7/2017 10:02 AM        Take 1 tablet by mouth daily.     Refills:  0       nitroGLYCERIN 0.4 MG Subl   Last time this was given:  0.4 mg on 4/7/2017 11:00 AM   Commonly known as:  NITROSTAT        Place 0.4 mg under the tongue every

## 2017-08-04 ENCOUNTER — APPOINTMENT (OUTPATIENT)
Dept: GENERAL RADIOLOGY | Facility: HOSPITAL | Age: 72
DRG: 872 | End: 2017-08-04
Attending: EMERGENCY MEDICINE
Payer: MEDICARE

## 2017-08-04 ENCOUNTER — HOSPITAL ENCOUNTER (INPATIENT)
Facility: HOSPITAL | Age: 72
LOS: 5 days | Discharge: HOME HEALTH | DRG: 872 | End: 2017-08-09
Attending: EMERGENCY MEDICINE | Admitting: INTERNAL MEDICINE
Payer: MEDICARE

## 2017-08-04 DIAGNOSIS — A41.9 SEPSIS, DUE TO UNSPECIFIED ORGANISM: Primary | ICD-10-CM

## 2017-08-04 DIAGNOSIS — R41.82 ALTERED MENTAL STATUS, UNSPECIFIED ALTERED MENTAL STATUS TYPE: ICD-10-CM

## 2017-08-04 LAB
ANION GAP SERPL CALC-SCNC: 10 MMOL/L (ref 0–18)
BASOPHILS # BLD: 0.1 K/UL (ref 0–0.2)
BASOPHILS NFR BLD: 0 %
BILIRUB UR QL: NEGATIVE
BUN SERPL-MCNC: 20 MG/DL (ref 8–20)
BUN/CREAT SERPL: 11.8 (ref 10–20)
CALCIUM SERPL-MCNC: 9.9 MG/DL (ref 8.5–10.5)
CHLORIDE SERPL-SCNC: 108 MMOL/L (ref 95–110)
CO2 SERPL-SCNC: 21 MMOL/L (ref 22–32)
COLOR UR: YELLOW
CREAT SERPL-MCNC: 1.69 MG/DL (ref 0.5–1.5)
EOSINOPHIL # BLD: 0 K/UL (ref 0–0.7)
EOSINOPHIL NFR BLD: 0 %
ERYTHROCYTE [DISTWIDTH] IN BLOOD BY AUTOMATED COUNT: 14.5 % (ref 11–15)
GLUCOSE BLDC GLUCOMTR-MCNC: 71 MG/DL (ref 70–99)
GLUCOSE BLDC GLUCOMTR-MCNC: 97 MG/DL (ref 70–99)
GLUCOSE SERPL-MCNC: 91 MG/DL (ref 70–99)
GLUCOSE UR-MCNC: NEGATIVE MG/DL
HCT VFR BLD AUTO: 45.4 % (ref 41–52)
HGB BLD-MCNC: 15 G/DL (ref 13.5–17.5)
KETONES UR-MCNC: NEGATIVE MG/DL
LACTATE SERPL-SCNC: 2.2 MMOL/L (ref 0.5–2.2)
LACTATE SERPL-SCNC: 3.6 MMOL/L (ref 0.5–2.2)
LYMPHOCYTES # BLD: 0.6 K/UL (ref 1–4)
LYMPHOCYTES NFR BLD: 3 %
MCH RBC QN AUTO: 32.2 PG (ref 27–32)
MCHC RBC AUTO-ENTMCNC: 33 G/DL (ref 32–37)
MCV RBC AUTO: 97.5 FL (ref 80–100)
MONOCYTES # BLD: 0.7 K/UL (ref 0–1)
MONOCYTES NFR BLD: 4 %
NEUTROPHILS # BLD AUTO: 18 K/UL (ref 1.8–7.7)
NEUTROPHILS NFR BLD: 93 %
NITRITE UR QL STRIP.AUTO: POSITIVE
OSMOLALITY UR CALC.SUM OF ELEC: 290 MOSM/KG (ref 275–295)
PH UR: 5 [PH] (ref 5–8)
PLATELET # BLD AUTO: 202 K/UL (ref 140–400)
PMV BLD AUTO: 8.3 FL (ref 7.4–10.3)
POTASSIUM SERPL-SCNC: 3.6 MMOL/L (ref 3.3–5.1)
PROT UR-MCNC: NEGATIVE MG/DL
RBC # BLD AUTO: 4.66 M/UL (ref 4.5–5.9)
RBC #/AREA URNS AUTO: 1 /HPF
SODIUM SERPL-SCNC: 139 MMOL/L (ref 136–144)
SP GR UR STRIP: 1.02 (ref 1–1.03)
UROBILINOGEN UR STRIP-ACNC: <2
VIT C UR-MCNC: NEGATIVE MG/DL
WBC # BLD AUTO: 19.4 K/UL (ref 4–11)
WBC #/AREA URNS AUTO: 5 /HPF

## 2017-08-04 PROCEDURE — 93005 ELECTROCARDIOGRAM TRACING: CPT

## 2017-08-04 PROCEDURE — 36415 COLL VENOUS BLD VENIPUNCTURE: CPT

## 2017-08-04 PROCEDURE — 96365 THER/PROPH/DIAG IV INF INIT: CPT

## 2017-08-04 PROCEDURE — 87077 CULTURE AEROBIC IDENTIFY: CPT | Performed by: EMERGENCY MEDICINE

## 2017-08-04 PROCEDURE — 99285 EMERGENCY DEPT VISIT HI MDM: CPT

## 2017-08-04 PROCEDURE — 81001 URINALYSIS AUTO W/SCOPE: CPT | Performed by: EMERGENCY MEDICINE

## 2017-08-04 PROCEDURE — 83605 ASSAY OF LACTIC ACID: CPT | Performed by: EMERGENCY MEDICINE

## 2017-08-04 PROCEDURE — 85025 COMPLETE CBC W/AUTO DIFF WBC: CPT | Performed by: EMERGENCY MEDICINE

## 2017-08-04 PROCEDURE — 82962 GLUCOSE BLOOD TEST: CPT

## 2017-08-04 PROCEDURE — 96375 TX/PRO/DX INJ NEW DRUG ADDON: CPT

## 2017-08-04 PROCEDURE — 87147 CULTURE TYPE IMMUNOLOGIC: CPT | Performed by: EMERGENCY MEDICINE

## 2017-08-04 PROCEDURE — 80048 BASIC METABOLIC PNL TOTAL CA: CPT | Performed by: EMERGENCY MEDICINE

## 2017-08-04 PROCEDURE — 71010 XR CHEST AP PORTABLE  (CPT=71010): CPT | Performed by: EMERGENCY MEDICINE

## 2017-08-04 PROCEDURE — 87040 BLOOD CULTURE FOR BACTERIA: CPT | Performed by: EMERGENCY MEDICINE

## 2017-08-04 PROCEDURE — 93010 ELECTROCARDIOGRAM REPORT: CPT | Performed by: EMERGENCY MEDICINE

## 2017-08-04 PROCEDURE — 51702 INSERT TEMP BLADDER CATH: CPT

## 2017-08-04 RX ORDER — ONDANSETRON 2 MG/ML
4 INJECTION INTRAMUSCULAR; INTRAVENOUS EVERY 4 HOURS PRN
Status: DISCONTINUED | OUTPATIENT
Start: 2017-08-04 | End: 2017-08-09

## 2017-08-04 RX ORDER — HYDROCODONE BITARTRATE AND ACETAMINOPHEN 5; 325 MG/1; MG/1
1 TABLET ORAL EVERY 8 HOURS PRN
Status: DISCONTINUED | OUTPATIENT
Start: 2017-08-04 | End: 2017-08-09

## 2017-08-04 RX ORDER — FAMOTIDINE 20 MG/1
20 TABLET ORAL 2 TIMES DAILY
Status: DISCONTINUED | OUTPATIENT
Start: 2017-08-04 | End: 2017-08-09

## 2017-08-04 RX ORDER — ACETAMINOPHEN 500 MG
1000 TABLET ORAL ONCE
Status: COMPLETED | OUTPATIENT
Start: 2017-08-04 | End: 2017-08-04

## 2017-08-04 RX ORDER — DEXTROSE MONOHYDRATE 25 G/50ML
50 INJECTION, SOLUTION INTRAVENOUS AS NEEDED
Status: DISCONTINUED | OUTPATIENT
Start: 2017-08-04 | End: 2017-08-09

## 2017-08-04 RX ORDER — ALFUZOSIN HYDROCHLORIDE 10 MG/1
10 TABLET, EXTENDED RELEASE ORAL DAILY
Status: DISCONTINUED | OUTPATIENT
Start: 2017-08-05 | End: 2017-08-09

## 2017-08-04 RX ORDER — NITROGLYCERIN 0.4 MG/1
0.4 TABLET SUBLINGUAL EVERY 5 MIN PRN
Status: DISCONTINUED | OUTPATIENT
Start: 2017-08-04 | End: 2017-08-09

## 2017-08-04 RX ORDER — MULTIPLE VITAMINS W/ MINERALS TAB 9MG-400MCG
1 TAB ORAL DAILY
Status: DISCONTINUED | OUTPATIENT
Start: 2017-08-05 | End: 2017-08-09

## 2017-08-04 RX ORDER — ALLOPURINOL 300 MG/1
300 TABLET ORAL DAILY
Status: DISCONTINUED | OUTPATIENT
Start: 2017-08-05 | End: 2017-08-09

## 2017-08-04 RX ORDER — SODIUM CHLORIDE 9 MG/ML
INJECTION, SOLUTION INTRAVENOUS CONTINUOUS
Status: ACTIVE | OUTPATIENT
Start: 2017-08-04 | End: 2017-08-04

## 2017-08-04 RX ORDER — ATORVASTATIN CALCIUM 10 MG/1
10 TABLET, FILM COATED ORAL NIGHTLY
Status: DISCONTINUED | OUTPATIENT
Start: 2017-08-04 | End: 2017-08-09

## 2017-08-04 RX ORDER — CLOPIDOGREL BISULFATE 75 MG/1
75 TABLET ORAL DAILY
Status: DISCONTINUED | OUTPATIENT
Start: 2017-08-05 | End: 2017-08-09

## 2017-08-04 RX ORDER — SODIUM CHLORIDE 9 MG/ML
INJECTION, SOLUTION INTRAVENOUS CONTINUOUS
Status: DISCONTINUED | OUTPATIENT
Start: 2017-08-04 | End: 2017-08-09

## 2017-08-04 RX ORDER — ASPIRIN 81 MG/1
81 TABLET ORAL DAILY
Status: DISCONTINUED | OUTPATIENT
Start: 2017-08-04 | End: 2017-08-09

## 2017-08-04 NOTE — ED INITIAL ASSESSMENT (HPI)
Patient from home for complaint of ams x2 hours per ems, patient incontinent upon arrival, a&ox1, patient denies complaints at this time

## 2017-08-04 NOTE — ED NOTES
Patient brought to er for ams x2 hours, patient incontinent upon arrival, malodorous urine, patient a&ox1 at this point, per ems patient is normally a&ox3-4 with dementia, patient obeying simple commands, denies any symptoms at this time, lungs cta

## 2017-08-05 ENCOUNTER — APPOINTMENT (OUTPATIENT)
Dept: CT IMAGING | Facility: HOSPITAL | Age: 72
DRG: 872 | End: 2017-08-05
Attending: INTERNAL MEDICINE
Payer: MEDICARE

## 2017-08-05 LAB
ANION GAP SERPL CALC-SCNC: 6 MMOL/L (ref 0–18)
BASOPHILS # BLD: 0 K/UL (ref 0–0.2)
BASOPHILS NFR BLD: 0 %
BUN SERPL-MCNC: 18 MG/DL (ref 8–20)
BUN/CREAT SERPL: 11.8 (ref 10–20)
CALCIUM SERPL-MCNC: 8.3 MG/DL (ref 8.5–10.5)
CHLORIDE SERPL-SCNC: 115 MMOL/L (ref 95–110)
CO2 SERPL-SCNC: 18 MMOL/L (ref 22–32)
CREAT SERPL-MCNC: 1.53 MG/DL (ref 0.5–1.5)
EOSINOPHIL # BLD: 0.2 K/UL (ref 0–0.7)
EOSINOPHIL NFR BLD: 1 %
ERYTHROCYTE [DISTWIDTH] IN BLOOD BY AUTOMATED COUNT: 15 % (ref 11–15)
GLUCOSE BLDC GLUCOMTR-MCNC: 108 MG/DL (ref 70–99)
GLUCOSE BLDC GLUCOMTR-MCNC: 148 MG/DL (ref 70–99)
GLUCOSE BLDC GLUCOMTR-MCNC: 62 MG/DL (ref 70–99)
GLUCOSE BLDC GLUCOMTR-MCNC: 73 MG/DL (ref 70–99)
GLUCOSE BLDC GLUCOMTR-MCNC: 92 MG/DL (ref 70–99)
GLUCOSE SERPL-MCNC: 69 MG/DL (ref 70–99)
HCT VFR BLD AUTO: 41.8 % (ref 41–52)
HGB BLD-MCNC: 13.7 G/DL (ref 13.5–17.5)
LYMPHOCYTES # BLD: 1.8 K/UL (ref 1–4)
LYMPHOCYTES NFR BLD: 7 %
MCH RBC QN AUTO: 32 PG (ref 27–32)
MCHC RBC AUTO-ENTMCNC: 32.7 G/DL (ref 32–37)
MCV RBC AUTO: 97.9 FL (ref 80–100)
MONOCYTES # BLD: 1.2 K/UL (ref 0–1)
MONOCYTES NFR BLD: 5 %
NEUTROPHILS # BLD AUTO: 21.5 K/UL (ref 1.8–7.7)
NEUTROPHILS NFR BLD: 87 %
OSMOLALITY UR CALC.SUM OF ELEC: 288 MOSM/KG (ref 275–295)
PLATELET # BLD AUTO: 149 K/UL (ref 140–400)
PMV BLD AUTO: 8.6 FL (ref 7.4–10.3)
POTASSIUM SERPL-SCNC: 3.9 MMOL/L (ref 3.3–5.1)
RBC # BLD AUTO: 4.27 M/UL (ref 4.5–5.9)
SODIUM SERPL-SCNC: 139 MMOL/L (ref 136–144)
WBC # BLD AUTO: 24.8 K/UL (ref 4–11)

## 2017-08-05 PROCEDURE — 82962 GLUCOSE BLOOD TEST: CPT

## 2017-08-05 PROCEDURE — 85025 COMPLETE CBC W/AUTO DIFF WBC: CPT | Performed by: INTERNAL MEDICINE

## 2017-08-05 PROCEDURE — 74177 CT ABD & PELVIS W/CONTRAST: CPT | Performed by: INTERNAL MEDICINE

## 2017-08-05 PROCEDURE — 97162 PT EVAL MOD COMPLEX 30 MIN: CPT

## 2017-08-05 PROCEDURE — 97530 THERAPEUTIC ACTIVITIES: CPT

## 2017-08-05 PROCEDURE — 97110 THERAPEUTIC EXERCISES: CPT

## 2017-08-05 PROCEDURE — 97116 GAIT TRAINING THERAPY: CPT

## 2017-08-05 PROCEDURE — 80048 BASIC METABOLIC PNL TOTAL CA: CPT | Performed by: INTERNAL MEDICINE

## 2017-08-05 PROCEDURE — 71260 CT THORAX DX C+: CPT | Performed by: INTERNAL MEDICINE

## 2017-08-05 NOTE — PROGRESS NOTES
[facility] Dosing Service  Antibiotic Dosing    Valerie Alatorre is a 80year old male for whom pharmacy is dosing UTI for treatment of  UTI. Allergies: has No Known Allergies.     Vitals: /55 (BP Location: Right arm)   Pulse 85   Temp 97.6 °F (36.4

## 2017-08-05 NOTE — PLAN OF CARE
Problem: Patient Centered Care  Goal: Patient preferences are identified and integrated in the patient's plan of care  Interventions:  - What would you like us to know as we care for you? Pt w/ h/o stroke, lives at home with wife and daughter. Pleasant. changes in respiratory status  - Assess for changes in mentation and behavior  - Position to facilitate oxygenation and minimize respiratory effort  - Oxygen supplementation based on oxygen saturation or ABGs  - Provide Smoking Cessation handout, if applic of injury  - Provide assistive devices as appropriate  - Consider OT/PT consult to assist with strengthening/mobility  - Encourage toileting schedule   Outcome: Progressing  Bed alarm for safety. Call light within reach.  Encouraging Q2 hour turns/repositio

## 2017-08-05 NOTE — CONSULTS
INFECTIOUS DISEASE CONSULT NOTE    [facility] Patient Status:  Inpatient    1945 MRN [id number]   Location [facility] 5SW/SE Attending Ольга Liu MD   Hosp Day # 1 PCP [facility] •  ondansetron HCl (ZOFRAN) injection 4 mg, 4 mg, Intravenous, Q4H PRN  •  HYDROcodone-acetaminophen (NORCO) 5-325 MG per tab 1 tablet, 1 tablet, Oral, Q8H PRN  •  aspirin EC tab 81 mg, 81 mg, Oral, Daily  •  allopurinol (ZYLOPRIM) tab 300 mg, 300 mg, Or murmurs. Abdomen: Soft, mild abdominal distension; mild RUQ TTP without guarding/reboun  : sales draining clear urine  Musculoskeletal: Full range of motion of all extremities. No swelling noted. Integument: No lesions. No erythema. No open wounds.

## 2017-08-05 NOTE — ED PROVIDER NOTES
Patient Seen in: [facility] Emergency Department    History   Patient presents with:  Altered Mental Status (neurologic)      HPI    The patient presents via ambulance for decreasing mental status that started several hours prior to ED arrival.  P mg, hydrochlorothiazide 25 MG TABS 25 mg Take 1 tablet by mouth daily. Disp:  Rfl:  4/5/2017 at Unknown time   nitroGLYCERIN 0.4 MG Sublingual SL Tab Place 0.4 mg under the tongue every 5 (five) minutes as needed for Chest pain.  Disp:  Rfl:  More than a mo Nose: Nose normal.   Mouth/Throat: Oropharynx is clear and moist.   Eyes: Right eye exhibits no discharge. Left eye exhibits no discharge. Neck: Normal range of motion. Neck supple. Cardiovascular: Normal rate. No murmur heard.   Pulmonary/Chest: E CBC W/ DIFFERENTIAL[[id number]]          Abnormal            Final result                 Please view results for these tests on the individual orders.    CBC WITH DIFFERENTIAL WITH PLATELET   BASIC METABOLIC PANEL (8)   39 Ariane Mcguire 100 UNIT/ML flexpen 1-5 Units (not administered)   losartan (COZAAR) 100 mg, hydrochlorothiazide (HYDRODIURIL) 25 mg for Hyzaar 100/25 (EEH only) (not administered)   Vancomycin HCl (VANCOCIN) 1,250 mg in sodium chloride 0.9 % 500 mL IVPB (not administered aggressive IV fluids, 30 mils per KG fluid bolus. Broad-spectrum antibiotic started after cultures obtained. Workup returned without significant other maladies.   Trace signs of urinary tract infection, low concern for a UTI as the cause of the patient's type R41.82 8/4/2017     [facility] A41.9 8/4/2017 Unknown

## 2017-08-05 NOTE — H&P
[facility]    History and Physical    Wayne  Patient Status:  Inpatient    1945 MRN [id number]   Location [facility] 5SW/SE Attending Kenisha Michael MD   Hosp Day # 1 PCP [facility]     Date:  2017  Date of Admi Calcium 10 MG Oral Tab Take 10 mg by mouth daily. Clopidogrel Bisulfate 75 MG Oral Tab Take 75 mg by mouth daily. famoTIDine 20 MG Oral Tab Take 20 mg by mouth 2 (two) times daily.    losartan 100 MG TABS 100 mg, hydrochlorothiazide 25 MG TABS 25 mg Derek ALB 2.8 (L) 04/12/2017   ALKPHO 65 04/08/2017   BILT 1.3 (H) 04/08/2017   TP 5.8 (L) 04/08/2017   AST 15 04/08/2017   ALT 12 (L) 04/08/2017   PTT 34.0 04/07/2017   INR 1.0 04/07/2017   MG 1.8 04/12/2017   PHOS 3.6 04/12/2017   TROP 0.07 (HH) 04/07/2017

## 2017-08-05 NOTE — PROGRESS NOTES
[facility] dosing service    Initial Pharmacokinetic Consult for Vancomycin Dosing     Caroline Alpers is a 80year old male admitted on 8/4 who is being treated for UTI. Pharmacy has been asked to dose Vancomycin by Dr. Sangeeta Méndez    He has No Known Allergies.

## 2017-08-05 NOTE — PHYSICAL THERAPY NOTE
PHYSICAL THERAPY EVALUATION - INPATIENT     Room Number: 202/982-C  Evaluation Date: 8/5/2017  Type of Evaluation: Initial  Physician Order: PT Eval and Treat    Presenting Problem: weakness, confusion  Reason for Therapy: Mobility Dysfunction and Disc chronic fatigue   • GERD (gastroesophageal reflux disease)     gerd   • Gout     chronic gout   • High cholesterol    • History of stomach ulcers    • HTN    • Incontinence    • Neuropathic pain     neuropathic pain of both legs   • PVD (peripheral vasc person does the patient currently need. ..   -   Moving to and from a bed to a chair (including a wheelchair)?: A Little   -   Need to walk in hospital room?: A Little   -   Climbing 3-5 steps with a railing?: A Lot     AM-PAC Score:  Raw Score: 17   PT Tiffanie

## 2017-08-06 LAB
ALBUMIN SERPL BCP-MCNC: 3.3 G/DL (ref 3.5–4.8)
ALBUMIN SERPL BCP-MCNC: 3.3 G/DL (ref 3.5–4.8)
ALBUMIN/GLOB SERPL: 1.1 {RATIO} (ref 1–2)
ALBUMIN/GLOB SERPL: 1.2 {RATIO} (ref 1–2)
ALP SERPL-CCNC: 64 U/L (ref 32–100)
ALP SERPL-CCNC: 66 U/L (ref 32–100)
ALT SERPL-CCNC: 12 U/L (ref 17–63)
ALT SERPL-CCNC: 12 U/L (ref 17–63)
AMYLASE SERPL-CCNC: 74 U/L (ref 24–108)
ANION GAP SERPL CALC-SCNC: 10 MMOL/L (ref 0–18)
ANION GAP SERPL CALC-SCNC: 7 MMOL/L (ref 0–18)
AST SERPL-CCNC: 14 U/L (ref 15–41)
AST SERPL-CCNC: 16 U/L (ref 15–41)
BASOPHILS # BLD: 0.1 K/UL (ref 0–0.2)
BASOPHILS # BLD: 0.1 K/UL (ref 0–0.2)
BASOPHILS NFR BLD: 1 %
BASOPHILS NFR BLD: 1 %
BILIRUB SERPL-MCNC: 0.3 MG/DL (ref 0.3–1.2)
BILIRUB SERPL-MCNC: 0.7 MG/DL (ref 0.3–1.2)
BUN SERPL-MCNC: 15 MG/DL (ref 8–20)
BUN SERPL-MCNC: 16 MG/DL (ref 8–20)
BUN/CREAT SERPL: 11.5 (ref 10–20)
BUN/CREAT SERPL: 9.9 (ref 10–20)
CALCIUM SERPL-MCNC: 8.5 MG/DL (ref 8.5–10.5)
CALCIUM SERPL-MCNC: 8.8 MG/DL (ref 8.5–10.5)
CHLORIDE SERPL-SCNC: 108 MMOL/L (ref 95–110)
CHLORIDE SERPL-SCNC: 114 MMOL/L (ref 95–110)
CO2 SERPL-SCNC: 18 MMOL/L (ref 22–32)
CO2 SERPL-SCNC: 20 MMOL/L (ref 22–32)
CREAT SERPL-MCNC: 1.39 MG/DL (ref 0.5–1.5)
CREAT SERPL-MCNC: 1.51 MG/DL (ref 0.5–1.5)
EOSINOPHIL # BLD: 0.3 K/UL (ref 0–0.7)
EOSINOPHIL # BLD: 0.3 K/UL (ref 0–0.7)
EOSINOPHIL NFR BLD: 2 %
EOSINOPHIL NFR BLD: 2 %
ERYTHROCYTE [DISTWIDTH] IN BLOOD BY AUTOMATED COUNT: 14.6 % (ref 11–15)
ERYTHROCYTE [DISTWIDTH] IN BLOOD BY AUTOMATED COUNT: 14.8 % (ref 11–15)
GLOBULIN PLAS-MCNC: 2.8 G/DL (ref 2.5–3.7)
GLOBULIN PLAS-MCNC: 3.1 G/DL (ref 2.5–3.7)
GLUCOSE BLDC GLUCOMTR-MCNC: 123 MG/DL (ref 70–99)
GLUCOSE BLDC GLUCOMTR-MCNC: 149 MG/DL (ref 70–99)
GLUCOSE BLDC GLUCOMTR-MCNC: 196 MG/DL (ref 70–99)
GLUCOSE BLDC GLUCOMTR-MCNC: 93 MG/DL (ref 70–99)
GLUCOSE SERPL-MCNC: 124 MG/DL (ref 70–99)
GLUCOSE SERPL-MCNC: 91 MG/DL (ref 70–99)
HCT VFR BLD AUTO: 40.6 % (ref 41–52)
HCT VFR BLD AUTO: 42 % (ref 41–52)
HGB BLD-MCNC: 13.3 G/DL (ref 13.5–17.5)
HGB BLD-MCNC: 13.7 G/DL (ref 13.5–17.5)
LIPASE SERPL-CCNC: 12 U/L (ref 22–51)
LYMPHOCYTES # BLD: 1.6 K/UL (ref 1–4)
LYMPHOCYTES # BLD: 2.5 K/UL (ref 1–4)
LYMPHOCYTES NFR BLD: 12 %
LYMPHOCYTES NFR BLD: 15 %
MCH RBC QN AUTO: 32 PG (ref 27–32)
MCH RBC QN AUTO: 32.1 PG (ref 27–32)
MCHC RBC AUTO-ENTMCNC: 32.5 G/DL (ref 32–37)
MCHC RBC AUTO-ENTMCNC: 32.7 G/DL (ref 32–37)
MCV RBC AUTO: 98.3 FL (ref 80–100)
MCV RBC AUTO: 98.5 FL (ref 80–100)
MONOCYTES # BLD: 0.6 K/UL (ref 0–1)
MONOCYTES # BLD: 0.7 K/UL (ref 0–1)
MONOCYTES NFR BLD: 4 %
MONOCYTES NFR BLD: 5 %
NEUTROPHILS # BLD AUTO: 10.9 K/UL (ref 1.8–7.7)
NEUTROPHILS # BLD AUTO: 12.9 K/UL (ref 1.8–7.7)
NEUTROPHILS NFR BLD: 78 %
NEUTROPHILS NFR BLD: 81 %
OSMOLALITY UR CALC.SUM OF ELEC: 285 MOSM/KG (ref 275–295)
OSMOLALITY UR CALC.SUM OF ELEC: 292 MOSM/KG (ref 275–295)
PLATELET # BLD AUTO: 160 K/UL (ref 140–400)
PLATELET # BLD AUTO: 162 K/UL (ref 140–400)
PMV BLD AUTO: 8.9 FL (ref 7.4–10.3)
PMV BLD AUTO: 9.2 FL (ref 7.4–10.3)
POTASSIUM SERPL-SCNC: 4.1 MMOL/L (ref 3.3–5.1)
POTASSIUM SERPL-SCNC: 4.4 MMOL/L (ref 3.3–5.1)
PROT SERPL-MCNC: 6.1 G/DL (ref 5.9–8.4)
PROT SERPL-MCNC: 6.4 G/DL (ref 5.9–8.4)
RBC # BLD AUTO: 4.13 M/UL (ref 4.5–5.9)
RBC # BLD AUTO: 4.26 M/UL (ref 4.5–5.9)
SODIUM SERPL-SCNC: 136 MMOL/L (ref 136–144)
SODIUM SERPL-SCNC: 141 MMOL/L (ref 136–144)
WBC # BLD AUTO: 13.4 K/UL (ref 4–11)
WBC # BLD AUTO: 16.5 K/UL (ref 4–11)

## 2017-08-06 PROCEDURE — 82962 GLUCOSE BLOOD TEST: CPT

## 2017-08-06 PROCEDURE — 97166 OT EVAL MOD COMPLEX 45 MIN: CPT

## 2017-08-06 PROCEDURE — 80053 COMPREHEN METABOLIC PANEL: CPT | Performed by: INTERNAL MEDICINE

## 2017-08-06 PROCEDURE — 85025 COMPLETE CBC W/AUTO DIFF WBC: CPT | Performed by: INTERNAL MEDICINE

## 2017-08-06 PROCEDURE — 83690 ASSAY OF LIPASE: CPT | Performed by: INTERNAL MEDICINE

## 2017-08-06 PROCEDURE — 82150 ASSAY OF AMYLASE: CPT | Performed by: INTERNAL MEDICINE

## 2017-08-06 NOTE — PLAN OF CARE
Problem: Patient Centered Care  Goal: Patient preferences are identified and integrated in the patient's plan of care  Interventions:  - What would you like us to know as we care for you? Pt w/ h/o stroke, lives at home with wife and daughter. Pleasant. Provide Smoking Cessation handout, if applicable  - Encourage broncho-pulmonary hygiene including cough, deep breathe, Incentive Spirometry  - Assess the need for suctioning and perform as needed  - Assess and instruct to report SOB or any respiratory diff resources  INTERVENTIONS:  - Identify barriers to discharge w/pt and caregiver  - Include patient/family/discharge partner in discharge planning  - Arrange for needed discharge resources and transportation as appropriate  - Identify discharge learning need

## 2017-08-06 NOTE — PROGRESS NOTES
INFECTIOUS DISEASE PROGRESS NOTE    Wallace Cox Patient Status:  Inpatient    1945 MRN [id number]   Location [facility] 5SW/SE Attending Elizabeth Nicole MD   [facility], resp. rate 24, height 5' 11\" (1.803 m), weight 184 lb 4 oz (83.6 kg), SpO2 96 %. HEENT: Moist mucous membranes. Extraocular muscles are intact. Neck: No lymphadenopathy. No JVD. No carotid bruits.   Respiratory: Clear to auscultation bilate provide excellent coverage for intra-abdominal sepsis due to appendicitis vs. other  -Reviewed results of CT chest/abdomen/pelvis with patient and daughter  -Recommend surgical consultation to evaluate for appendicitis further  -Discussed with patient and

## 2017-08-06 NOTE — OCCUPATIONAL THERAPY NOTE
OCCUPATIONAL THERAPY EVALUATION - INPATIENT     Room Number: 346/806-O  Evaluation Date: 8/6/2017  Type of Evaluation: Initial  Presenting Problem: sepsis, AMS    Physician Order: IP Consult to Occupational Therapy  Reason for Therapy: ADL/IADL Dysfunction (benign prostatic hyperplasia)     bph   • Chest pain     chest pain on exertion   • Deep vein thrombosis (HCC)    • Dementia    • Diabetes (HCC)    • Fatigue     chronic fatigue   • GERD (gastroesophageal reflux disease)     gerd   • Gout     chronic gout within functional limits 5/5 strength    COORDINATION  Gross Motor: WFL   Fine Motor: WFL     ADDITIONAL TESTS                                    NEUROLOGICAL FINDINGS  Neurological Findings: None                  ACTIVITIES OF DAILY LIVING ASSESSMENT  AM- complete lower body dressing with supervision  Comment:                Goals  on: 17  Frequency: 3x/week

## 2017-08-06 NOTE — PLAN OF CARE
Problem: Patient Centered Care  Goal: Patient preferences are identified and integrated in the patient's plan of care  Interventions:  - What would you like us to know as we care for you? Pt w/ h/o stroke, lives at home with wife and daughter. Pleasant. oxygenation and minimize respiratory effort  - Oxygen supplementation based on oxygen saturation or ABGs  - Provide Smoking Cessation handout, if applicable  - Encourage broncho-pulmonary hygiene including cough, deep breathe, Incentive Spirometry  - Asses Encourage toileting schedule   Outcome: Progressing  Up with 1 assist and walker. Bed and chair alarms for safety. Call light within reach.     Problem: DISCHARGE PLANNING  Goal: Discharge to home or other facility with appropriate resources  INTERVENTIONS:

## 2017-08-07 LAB
GLUCOSE BLDC GLUCOMTR-MCNC: 130 MG/DL (ref 70–99)
GLUCOSE BLDC GLUCOMTR-MCNC: 131 MG/DL (ref 70–99)
GLUCOSE BLDC GLUCOMTR-MCNC: 131 MG/DL (ref 70–99)
GLUCOSE BLDC GLUCOMTR-MCNC: 171 MG/DL (ref 70–99)
GLUCOSE BLDC GLUCOMTR-MCNC: 98 MG/DL (ref 70–99)
VANCOMYCIN TROUGH SERPL-MCNC: 7.7 MCG/ML (ref 10–20)

## 2017-08-07 PROCEDURE — 80202 ASSAY OF VANCOMYCIN: CPT | Performed by: INTERNAL MEDICINE

## 2017-08-07 PROCEDURE — 82962 GLUCOSE BLOOD TEST: CPT

## 2017-08-07 PROCEDURE — 87040 BLOOD CULTURE FOR BACTERIA: CPT | Performed by: INTERNAL MEDICINE

## 2017-08-07 NOTE — PLAN OF CARE
Problem: Diabetes/Glucose Control  Goal: Glucose maintained within prescribed range  INTERVENTIONS:  - Monitor Blood Glucose as ordered  - Assess for signs and symptoms of hyperglycemia and hypoglycemia  - Administer ordered medications to maintain glucose injury  INTERVENTIONS:  - Assess pt frequently for physical needs  - Identify cognitive and physical deficits and behaviors that affect risk of falls.   - [place] fall precautions as indicated by assessment.  - Educate pt/family on patient safety includin

## 2017-08-07 NOTE — PROGRESS NOTES
[facility] dosing service    Follow-up Pharmacokinetic Consult for Vancomycin Dosing     Wayne Sandoval is a 80year old male admitted on 8/4 who is being treated for possible bacteremia. Patient is on day 4 of Vancomycin 1.25 gm IV Q 24 hours.   Goal trou prior to 3rd dose. Goal trough level 15-20 ug/mL. 3.  Pharmacy will need BUN/Scr daily while on Vancomycin to assess renal function. 4.  Pharmacy will follow and monitor renal function changes, toxicity and efficacy.     Nena Fernando, PharmD  8/7/201

## 2017-08-07 NOTE — CM/SW NOTE
Met with patient and wife at bedside to explain the BPCI/Medicare program. Patients wife agreed with phone follow up for 3 months from [address] [place] [address] after discharge from [address] [place] [address]. Patient was enrolled under    .  BPCI/Medicare letter and brochure provid

## 2017-08-07 NOTE — PROGRESS NOTES
[place] [facility] - [facility]    Progress Note    Wallace Cox Patient Status:  Inpatient    1945 MRN [id number]   Location [facility] 5SW/SE Attending Elizabeth Nicole MD   Hosp Day # 2 PCP LORY HORN     Subjective:   Subjective:  Better/ 04/07/2017       Ct Chest+abdomen+pelvis(all Cntrst Only)(cpt=[id number])    Result Date: 8/5/2017  CONCLUSION:   Patient has a known enlarged appendix which was seen on the prior exam when it measured up to 1 cm.  However on today's exam there is even mor

## 2017-08-07 NOTE — CONSULTS
[facility]    Report of Consultation    Pranay Ivy Patient Status:  Inpatient    1945 MRN [id number]   Location [facility] 5SW/SE Attending Ольга Liu MD   Hosp Day # 3 PCP LORY HORN     Date of Admission:   Sister    • Diabetes Sister    • Cancer Brother    • Cancer Brother       reports that he has been smoking. He has never used smokeless tobacco. He reports that he drinks alcohol.     Allergies:  No Known Allergies    Medications:    Current Facility-Admin membranes are moist. Pupils are equal and round, reactive to light and accommodate. Pupils are approximately 3mm and react to 2mm with reaction to light. Oropharynx is clear. Neck: No tenderness to palpitation.   Full range of motion to flexion and exten patient:  1 Hour 30 Minutes    EMILEE RIVERS  8/7/2017  7:02 AM

## 2017-08-08 LAB
BASOPHILS # BLD: 0.1 K/UL (ref 0–0.2)
BASOPHILS NFR BLD: 1 %
EOSINOPHIL # BLD: 0.3 K/UL (ref 0–0.7)
EOSINOPHIL NFR BLD: 5 %
ERYTHROCYTE [DISTWIDTH] IN BLOOD BY AUTOMATED COUNT: 14.4 % (ref 11–15)
GLUCOSE BLDC GLUCOMTR-MCNC: 103 MG/DL (ref 70–99)
HCT VFR BLD AUTO: 39.4 % (ref 41–52)
HGB BLD-MCNC: 13.2 G/DL (ref 13.5–17.5)
LYMPHOCYTES # BLD: 2.5 K/UL (ref 1–4)
LYMPHOCYTES NFR BLD: 36 %
MCH RBC QN AUTO: 32.7 PG (ref 27–32)
MCHC RBC AUTO-ENTMCNC: 33.6 G/DL (ref 32–37)
MCV RBC AUTO: 97.3 FL (ref 80–100)
MONOCYTES # BLD: 0.5 K/UL (ref 0–1)
MONOCYTES NFR BLD: 7 %
NEUTROPHILS # BLD AUTO: 3.6 K/UL (ref 1.8–7.7)
NEUTROPHILS NFR BLD: 52 %
PLATELET # BLD AUTO: 179 K/UL (ref 140–400)
PMV BLD AUTO: 9.1 FL (ref 7.4–10.3)
RBC # BLD AUTO: 4.05 M/UL (ref 4.5–5.9)
WBC # BLD AUTO: 6.9 K/UL (ref 4–11)

## 2017-08-08 PROCEDURE — 82962 GLUCOSE BLOOD TEST: CPT

## 2017-08-08 PROCEDURE — 97110 THERAPEUTIC EXERCISES: CPT

## 2017-08-08 PROCEDURE — 97116 GAIT TRAINING THERAPY: CPT

## 2017-08-08 PROCEDURE — 85025 COMPLETE CBC W/AUTO DIFF WBC: CPT | Performed by: SURGERY

## 2017-08-08 NOTE — DISCHARGE PLANNING
RAAD received for home health services. ALISHA initiated referral to [facility]. ID notation also indicates the pt will need abx. ALISHA confirmed the pt is anticipated for 14days of iv invanz.  [facility] cannot service the home provider, so ALISHA will send te

## 2017-08-08 NOTE — PROGRESS NOTES
[facility]    Progress Note    Valerie Alatorre Patient Status:  Inpatient    1945 MRN [id number]   [facility] 5SW/SE Attending Jose Manuel Johnson MD   Hosp Day # 4 PCP LORY KAHN     Subjective:  I FEEL WELL  NO PAIN ON CHRONIC APPENDICITIS  RESPONDING TO MEDICAL MANAGEMENT  NO PAIN ON TODAY'S EXAM  CONTINUE IV ABX PER ID  PT AND FAMILY AWARE OF NEED TO F/U WITH ME IN THE OFFICE TO SCHEDULE INTERVAL APPENDECTOMY    Time spent on counseling/coordination of care:  [id number]-

## 2017-08-08 NOTE — PROGRESS NOTES
INFECTIOUS DISEASE PROGRESS NOTE    [facility] Patient Status:  Inpatient    1945 MRN [id number]   Location [facility] 5SW/SE Attending Ann Grant MD   Hosp Day # 4 PCP [facility] (83.6 kg), SpO2 94 %. HEENT: Moist mucous membranes. Extraocular muscles are intact. Neck: No lymphadenopathy. No JVD. No carotid bruits. Respiratory: Clear to auscultation bilaterally. No wheezes. No rhonchi.   Cardiovascular: S1, S2.  Regular rate an

## 2017-08-08 NOTE — PLAN OF CARE
Problem: Diabetes/Glucose Control  Goal: Glucose maintained within prescribed range  INTERVENTIONS:  - Monitor Blood Glucose as ordered  - Assess for signs and symptoms of hyperglycemia and hypoglycemia  - Administer ordered medications to maintain glucose Progressing  Blood glucose monitored, pt with ss coverage, instructed on side effects of novolog, and symptoms of hypoglycemia.  Will continue to monitor    Problem: SAFETY ADULT - FALL  Goal: Free from fall injury  INTERVENTIONS:  - Assess pt frequently fo

## 2017-08-08 NOTE — PROGRESS NOTES
[facility]    Progress Note    Kathya Cervantes Patient Status:  Inpatient    1945 MRN [id number]   Location [facility] 5SW/SE Attending Ananya Love MD   Hosp Day # 4 PCP LORY KAHN     Subjective:     Constitutional: Ne PHOS 3.6 04/12/2017   TROP 0.07 () 04/07/2017                 **Certification      PHYSICIAN Certification of Need for Inpatient Hospitalization -     Patient will require inpatient services that will reasonably be expected to span two midnight's based

## 2017-08-08 NOTE — PROGRESS NOTES
INFECTIOUS DISEASE PROGRESS NOTE    Matty Sanabria Patient Status:  Inpatient    1945 MRN [id number]   Location [facility] 5SW/SE Attending Navneet Larkin MD   Hosp Day # 3 PCP [facility] 184 lb 4 oz (83.6 kg), SpO2 95 %. HEENT: Moist mucous membranes. Extraocular muscles are intact. Neck: No lymphadenopathy. No JVD. No carotid bruits. Respiratory: Clear to auscultation bilaterally. No wheezes. No rhonchi.   Cardiovascular: S1, S2.  Reg

## 2017-08-08 NOTE — PROGRESS NOTES
[facility]    Progress Note    Elex Fears Patient Status:  Inpatient    1945 MRN [id number]   Location [facility] 5SW/SE Attending Asha Eric MD   Hosp Day # 4 PCP LORY KAHN     Subjective:     Constitutional: Ne TROP 0.07 () 04/07/2017                 **Certification      PHYSICIAN Certification of Need for Inpatient Hospitalization -     Patient will require inpatient services that will reasonably be expected to span two midnight's based on the clinical docum

## 2017-08-08 NOTE — PHYSICAL THERAPY NOTE
PHYSICAL THERAPY TREATMENT NOTE - INPATIENT    Room Number: 708/673-B       Presenting Problem: weakness, confusion    Problem List  Principal Problem:    Sepsis, due to unspecified organism [facility])  Active Problems:    Sepsis ([facility]    Altered mental status blankets)?: None   -   Sitting down on and standing up from a chair with arms (e.g., wheelchair, bedside commode, etc.): A Little   -   Moving from lying on back to sitting on the side of the bed?: A Little   How much help from another person does the monae #4   Current Status  NT   Goal #5 Patient to demonstrate improved participation  with home activity/exercise instructions provided to patient in preparation for discharge.    Goal #5   Current Status Particiapted given verbal and demo cues for therex in sit

## 2017-08-09 VITALS
HEART RATE: 67 BPM | SYSTOLIC BLOOD PRESSURE: 140 MMHG | OXYGEN SATURATION: 94 % | BODY MASS INDEX: 25.79 KG/M2 | WEIGHT: 184.25 LBS | RESPIRATION RATE: 18 BRPM | TEMPERATURE: 99 F | HEIGHT: 71 IN | DIASTOLIC BLOOD PRESSURE: 68 MMHG

## 2017-08-09 LAB
GLUCOSE BLDC GLUCOMTR-MCNC: 114 MG/DL (ref 70–99)
GLUCOSE BLDC GLUCOMTR-MCNC: 119 MG/DL (ref 70–99)
GLUCOSE BLDC GLUCOMTR-MCNC: 120 MG/DL (ref 70–99)
GLUCOSE BLDC GLUCOMTR-MCNC: 124 MG/DL (ref 70–99)
GLUCOSE BLDC GLUCOMTR-MCNC: 129 MG/DL (ref 70–99)
GLUCOSE BLDC GLUCOMTR-MCNC: 132 MG/DL (ref 70–99)

## 2017-08-09 PROCEDURE — 82962 GLUCOSE BLOOD TEST: CPT

## 2017-08-09 PROCEDURE — 97116 GAIT TRAINING THERAPY: CPT

## 2017-08-09 PROCEDURE — 97530 THERAPEUTIC ACTIVITIES: CPT

## 2017-08-09 PROCEDURE — 97535 SELF CARE MNGMENT TRAINING: CPT

## 2017-08-09 PROCEDURE — 76937 US GUIDE VASCULAR ACCESS: CPT

## 2017-08-09 PROCEDURE — 36569 INSJ PICC 5 YR+ W/O IMAGING: CPT

## 2017-08-09 PROCEDURE — 02HV33Z INSERTION OF INFUSION DEVICE INTO SUPERIOR VENA CAVA, PERCUTANEOUS APPROACH: ICD-10-PCS | Performed by: INTERNAL MEDICINE

## 2017-08-09 RX ORDER — ALLOPURINOL 100 MG/1
100 TABLET ORAL DAILY
Qty: 30 TABLET | Refills: 1 | Status: ON HOLD | OUTPATIENT
Start: 2017-08-09 | End: 2021-09-08

## 2017-08-09 RX ORDER — SODIUM CHLORIDE 0.9 % (FLUSH) 0.9 %
10 SYRINGE (ML) INJECTION AS NEEDED
Status: DISCONTINUED | OUTPATIENT
Start: 2017-08-09 | End: 2017-08-09

## 2017-08-09 RX ORDER — LIDOCAINE HYDROCHLORIDE 10 MG/ML
0.5 INJECTION, SOLUTION INFILTRATION; PERINEURAL ONCE AS NEEDED
Status: DISCONTINUED | OUTPATIENT
Start: 2017-08-09 | End: 2017-08-09

## 2017-08-09 NOTE — HOME CARE LIAISON
MET WITH PATIENT, SPOUSE AND DAUGHTER KADEN MONTILLA [address] WITH [facility]YNDA GONZALES,  AND SHAE FROM [facility]. PATIENT/FAMILY IN AGREEMENT WITH SERVICES BEING PROVIDED BY [facility].  PROVIDED RESIDENTIAL BROCHURE WITH

## 2017-08-09 NOTE — DISCHARGE PLANNING
2:06pm Denzel BRITO met with the pt, along with wife and dtr Justyn Samaniego. [facility]Queenie and [facility]acy also present. The wife and daughters are both agreeable to do the teachings at home. They are comfortable with the arrangements. All questions answered.

## 2017-08-09 NOTE — OCCUPATIONAL THERAPY NOTE
OCCUPATIONAL THERAPY TREATMENT NOTE - INPATIENT     Room Number: 786/945-U          Presenting Problem: sepsis, AMS    Problem List  Principal Problem:    Sepsis, due to unspecified organism [facility])  Active Problems:    Sepsis ([facility]    Altered mental status, taking off regular lower body clothing?: A Little  -   Bathing (including washing, rinsing, drying)?: A Little  -   Toileting, which includes using toilet, bedpan or urinal? : A Little  -   Putting on and taking off regular upper body clothing?: A Little

## 2017-08-09 NOTE — PROGRESS NOTES
INFECTIOUS DISEASE PROGRESS NOTE    Jessica May Patient Status:  Inpatient    1945 MRN [id number]   Location [facility] 5SW/SE Attending Jian Cook MD   Hosp Day # 5 PCP [facility] oz (83.6 kg), SpO2 94 %. HEENT: Moist mucous membranes. Extraocular muscles are intact. Neck: No lymphadenopathy. No JVD. No carotid bruits. Respiratory: Clear to auscultation bilaterally. No wheezes. No rhonchi.   Cardiovascular: S1, S2.  Regular rate

## 2017-08-09 NOTE — PLAN OF CARE
Pt discharged home with home health dose on invaz 1g given trough PICC line.  No adverse reaction noted, written instructions given all questions answered, pt left in stable condition with family

## 2017-08-10 ENCOUNTER — TELEPHONE (OUTPATIENT)
Dept: CARDIOLOGY UNIT | Facility: HOSPITAL | Age: 72
End: 2017-08-10

## 2017-08-21 ENCOUNTER — LAB REQUISITION (OUTPATIENT)
Dept: LAB | Facility: HOSPITAL | Age: 72
End: 2017-08-21
Payer: MEDICARE

## 2017-08-21 DIAGNOSIS — A41.9 SEPSIS (HCC): ICD-10-CM

## 2017-08-21 LAB
ALBUMIN SERPL BCP-MCNC: 4 G/DL (ref 3.5–4.8)
ALBUMIN/GLOB SERPL: 1.5 {RATIO} (ref 1–2)
ALP SERPL-CCNC: 84 U/L (ref 32–100)
ALT SERPL-CCNC: 24 U/L (ref 17–63)
ANION GAP SERPL CALC-SCNC: 9 MMOL/L (ref 0–18)
AST SERPL-CCNC: 21 U/L (ref 15–41)
BASOPHILS # BLD: 0.1 K/UL (ref 0–0.2)
BASOPHILS NFR BLD: 1 %
BILIRUB SERPL-MCNC: 0.6 MG/DL (ref 0.3–1.2)
BUN SERPL-MCNC: 27 MG/DL (ref 8–20)
BUN/CREAT SERPL: 16.9 (ref 10–20)
CALCIUM SERPL-MCNC: 9.4 MG/DL (ref 8.5–10.5)
CHLORIDE SERPL-SCNC: 113 MMOL/L (ref 95–110)
CO2 SERPL-SCNC: 20 MMOL/L (ref 22–32)
CREAT SERPL-MCNC: 1.6 MG/DL (ref 0.5–1.5)
EOSINOPHIL # BLD: 0.3 K/UL (ref 0–0.7)
EOSINOPHIL NFR BLD: 5 %
ERYTHROCYTE [DISTWIDTH] IN BLOOD BY AUTOMATED COUNT: 14.8 % (ref 11–15)
GLOBULIN PLAS-MCNC: 2.7 G/DL (ref 2.5–3.7)
GLUCOSE SERPL-MCNC: 49 MG/DL (ref 70–99)
HCT VFR BLD AUTO: 41.7 % (ref 41–52)
HGB BLD-MCNC: 13.9 G/DL (ref 13.5–17.5)
LYMPHOCYTES # BLD: 3 K/UL (ref 1–4)
LYMPHOCYTES NFR BLD: 46 %
MCH RBC QN AUTO: 32.5 PG (ref 27–32)
MCHC RBC AUTO-ENTMCNC: 33.5 G/DL (ref 32–37)
MCV RBC AUTO: 97.1 FL (ref 80–100)
MONOCYTES # BLD: 0.5 K/UL (ref 0–1)
MONOCYTES NFR BLD: 7 %
NEUTROPHILS # BLD AUTO: 2.7 K/UL (ref 1.8–7.7)
NEUTROPHILS NFR BLD: 42 %
OSMOLALITY UR CALC.SUM OF ELEC: 296 MOSM/KG (ref 275–295)
PLATELET # BLD AUTO: 307 K/UL (ref 140–400)
PMV BLD AUTO: 8.2 FL (ref 7.4–10.3)
POTASSIUM SERPL-SCNC: 3.9 MMOL/L (ref 3.3–5.1)
PROT SERPL-MCNC: 6.7 G/DL (ref 5.9–8.4)
RBC # BLD AUTO: 4.29 M/UL (ref 4.5–5.9)
SODIUM SERPL-SCNC: 142 MMOL/L (ref 136–144)
WBC # BLD AUTO: 6.5 K/UL (ref 4–11)

## 2017-08-21 PROCEDURE — 80053 COMPREHEN METABOLIC PANEL: CPT | Performed by: INTERNAL MEDICINE

## 2017-08-21 PROCEDURE — 85025 COMPLETE CBC W/AUTO DIFF WBC: CPT | Performed by: INTERNAL MEDICINE

## 2017-11-01 NOTE — DISCHARGE SUMMARY
[facility]  Discharge Summary    Jessica Rough Patient Status:  Inpatient    1945 MRN [id number]   Location [facility] 5SW/SE Attending No att. providers found   Hosp Day # 5 PCP Eleni Falcon MD     Date of Admission: 2017    Greg quadrants,     no masses, no organomegaly   Genitalia:    Normal female without lesion, discharge or tenderness   Rectal:    DEFERRED   Extremities:   Extremities normal, atraumatic, no cyanosis or edema   Pulses:   2+ and symmetric all extremities   Skin: SL Tab  Place 0.4 mg under the tongue every 5 (five) minutes as needed for Chest pain., Historical    tamsulosin HCl 0.4 MG Oral Cap  Take 0.4 mg by mouth daily. , Historical    THERAPEUTIC MULTIVIT/MINERAL Oral Tab  Take 1 tablet by mouth daily. , Sophia Colon

## 2018-05-31 ENCOUNTER — HOSPITAL ENCOUNTER (OUTPATIENT)
Dept: GENERAL RADIOLOGY | Age: 73
Discharge: HOME | End: 2018-05-31
Attending: INTERNAL MEDICINE
Payer: MEDICARE

## 2018-05-31 DIAGNOSIS — R05.9 COUGH: ICD-10-CM

## 2018-05-31 PROCEDURE — 85025 COMPLETE CBC W/AUTO DIFF WBC: CPT | Performed by: INTERNAL MEDICINE

## 2018-05-31 PROCEDURE — 83036 HEMOGLOBIN GLYCOSYLATED A1C: CPT | Performed by: INTERNAL MEDICINE

## 2018-05-31 PROCEDURE — 71046 X-RAY EXAM CHEST 2 VIEWS: CPT | Performed by: INTERNAL MEDICINE

## 2018-05-31 PROCEDURE — 80053 COMPREHEN METABOLIC PANEL: CPT | Performed by: INTERNAL MEDICINE

## 2018-05-31 PROCEDURE — 84443 ASSAY THYROID STIM HORMONE: CPT | Performed by: INTERNAL MEDICINE

## 2021-07-08 NOTE — IP AVS SNAPSHOT
[facility]            (For Outpatient Use Only) Initial Admit Date: 10/17/2022   Inpt/Obs Admit Date: Inpt: 10/17/22 / Obs: N/A   Discharge Date:    Levell Lease:  [de-identified]   MRN: [de-identified]   CSN: [id number]   CEID: [id number]        ENCOUNTER  Patient Class: Inpatient Admitting Provider: Eric Oneal MD Unit: [address] [place] [address]/SE   Hospital Service: Medical Attending Provider: Eric Oneal MD   Bed: 532-A   Visit Type:   Referring Physician: No ref. provider found Billing Flag:    Admit Diagnosis: Dehydration [E86.0]      PATIENT  Legal Name:   Alyce Zuñiga    Legal Sex: Male  Gender ID:              Pref Name:    PCP:  Osmin Pettit: [phone]   Address:  [address] [place] [address] [address]* : 1945 (77 yrs) Mobile: No mobile phone on file. [facility] [id number] Marital:  Language: [address] [place] [address] Drive: [employer] SSN4: [id number] Orthodoxy: [address]     Race: Black Or  Ethnicity: Non  Or  O*   EMERGENCY CONTACT   Name Relationship Legal Guardian? Home Phone Work Phone Mobile Phone   1. Fernandez Eric  2. Christians,Martees Spouse  Daughter          [phone]     GUARANTOR  Guarantor: ROGERS ERIC : 1945 Home Phone: [phone]   Address: [address] [place] [address] [address]  Sex:  Male Work Phone:    [facility] [place], [address]   Rel. to Patient: Self Guarantor ID: [id number]   GUARANTOR EMPLOYER   Employer:  Status: RETIRED     COVERAGE  PRIMARY INSURANCE   Payor: MEDICARE Plan: MEDICARE PART A&B   Group Number:  Insurance Type: INDEMNITY   Subscriber Name: Jeana Modi : 1945   Subscriber ID: [id number] Pt Rel to Subscriber: Self   SECONDARY INSURANCE   Payor: HMO MEDICAID Plan: Les Pino FHP/ICP   Group Number:  Insurance Type: INDEMNITY   Subscriber Name: Jeana Modi : 1945   Subscriber ID: [id number] Pt Rel to Subscriber: SELF   TERTIARY INSURANCE   Payor:  Plan:    Group Number:  Insurance Type:    Subscriber Name:  Subscriber :    Subscriber ID:  Pt Rel to Subscriber:    Hospital Account Financial Class: Medicare    2022 No

## 2021-09-07 ENCOUNTER — HOSPITAL ENCOUNTER (INPATIENT)
Facility: HOSPITAL | Age: 76
LOS: 2 days | Discharge: NURSING, REHAB OR LONG-TERM CARE | DRG: 682 | End: 2021-09-10
Attending: EMERGENCY MEDICINE | Admitting: HOSPITALIST

## 2021-09-07 ENCOUNTER — HOSPITAL ENCOUNTER (INPATIENT)
Facility: HOSPITAL | Age: 76
LOS: 2 days | Discharge: NURSING, REHAB OR LONG-TERM CARE | DRG: 682 | End: 2021-09-10
Attending: EMERGENCY MEDICINE | Admitting: HOSPITALIST

## 2021-09-07 DIAGNOSIS — E16.2 HYPOGLYCEMIA: Primary | ICD-10-CM

## 2021-09-07 DIAGNOSIS — N19 RENAL FAILURE, UNSPECIFIED CHRONICITY: ICD-10-CM

## 2021-09-07 LAB
ANION GAP SERPL CALC-SCNC: 10 MMOL/L (ref 0–18)
BASOPHILS # BLD AUTO: 0.06 X10(3) UL (ref 0–0.2)
BASOPHILS NFR BLD AUTO: 0.9 %
BUN BLD-MCNC: 44 MG/DL (ref 7–18)
BUN/CREAT SERPL: 13.1 (ref 10–20)
CALCIUM BLD-MCNC: 9.4 MG/DL (ref 8.5–10.1)
CHLORIDE SERPL-SCNC: 111 MMOL/L (ref 98–112)
CO2 SERPL-SCNC: 19 MMOL/L (ref 21–32)
CREAT BLD-MCNC: 3.36 MG/DL
DEPRECATED RDW RBC AUTO: 43.6 FL (ref 35.1–46.3)
EOSINOPHIL # BLD AUTO: 0.29 X10(3) UL (ref 0–0.7)
EOSINOPHIL NFR BLD AUTO: 4.2 %
ERYTHROCYTE [DISTWIDTH] IN BLOOD BY AUTOMATED COUNT: 12.8 % (ref 11–15)
EST. AVERAGE GLUCOSE BLD GHB EST-MCNC: 169 MG/DL (ref 68–126)
GLUCOSE BLD-MCNC: 48 MG/DL (ref 70–99)
GLUCOSE BLDC GLUCOMTR-MCNC: 130 MG/DL (ref 70–99)
GLUCOSE BLDC GLUCOMTR-MCNC: 132 MG/DL (ref 70–99)
GLUCOSE BLDC GLUCOMTR-MCNC: 140 MG/DL (ref 70–99)
GLUCOSE BLDC GLUCOMTR-MCNC: 51 MG/DL (ref 70–99)
HBA1C MFR BLD HPLC: 7.5 % (ref ?–5.7)
HCT VFR BLD AUTO: 42.4 %
HGB BLD-MCNC: 14.4 G/DL
IMM GRANULOCYTES # BLD AUTO: 0.05 X10(3) UL (ref 0–1)
IMM GRANULOCYTES NFR BLD: 0.7 %
LYMPHOCYTES # BLD AUTO: 2.33 X10(3) UL (ref 1–4)
LYMPHOCYTES NFR BLD AUTO: 33.8 %
MCH RBC QN AUTO: 31.5 PG (ref 26–34)
MCHC RBC AUTO-ENTMCNC: 34 G/DL (ref 31–37)
MCV RBC AUTO: 92.8 FL
MONOCYTES # BLD AUTO: 0.66 X10(3) UL (ref 0.1–1)
MONOCYTES NFR BLD AUTO: 9.6 %
NEUTROPHILS # BLD AUTO: 3.51 X10 (3) UL (ref 1.5–7.7)
NEUTROPHILS # BLD AUTO: 3.51 X10(3) UL (ref 1.5–7.7)
NEUTROPHILS NFR BLD AUTO: 50.8 %
OSMOLALITY SERPL CALC.SUM OF ELEC: 298 MOSM/KG (ref 275–295)
PLATELET # BLD AUTO: 153 10(3)UL (ref 150–450)
POTASSIUM SERPL-SCNC: 3.9 MMOL/L (ref 3.5–5.1)
RBC # BLD AUTO: 4.57 X10(6)UL
SARS-COV-2 RNA RESP QL NAA+PROBE: NOT DETECTED
SODIUM SERPL-SCNC: 140 MMOL/L (ref 136–145)
WBC # BLD AUTO: 6.9 X10(3) UL (ref 4–11)

## 2021-09-07 PROCEDURE — 99223 1ST HOSP IP/OBS HIGH 75: CPT | Performed by: HOSPITALIST

## 2021-09-07 RX ORDER — DORZOLAMIDE HYDROCHLORIDE AND TIMOLOL MALEATE 20; 5 MG/ML; MG/ML
1 SOLUTION/ DROPS OPHTHALMIC 2 TIMES DAILY
Status: DISCONTINUED | OUTPATIENT
Start: 2021-09-07 | End: 2021-09-10

## 2021-09-07 RX ORDER — DORZOLAMIDE HYDROCHLORIDE AND TIMOLOL MALEATE 20; 5 MG/ML; MG/ML
1 SOLUTION/ DROPS OPHTHALMIC 2 TIMES DAILY
COMMUNITY

## 2021-09-07 RX ORDER — ACETAMINOPHEN 325 MG/1
650 TABLET ORAL EVERY 6 HOURS PRN
Status: DISCONTINUED | OUTPATIENT
Start: 2021-09-07 | End: 2021-09-10

## 2021-09-07 RX ORDER — ONDANSETRON 2 MG/ML
4 INJECTION INTRAMUSCULAR; INTRAVENOUS EVERY 6 HOURS PRN
Status: DISCONTINUED | OUTPATIENT
Start: 2021-09-07 | End: 2021-09-10

## 2021-09-07 RX ORDER — CIPROFLOXACIN 500 MG/1
500 TABLET, FILM COATED ORAL 2 TIMES DAILY
Status: ON HOLD | COMMUNITY
End: 2021-09-07

## 2021-09-07 RX ORDER — TRAZODONE HYDROCHLORIDE 50 MG/1
50 TABLET ORAL NIGHTLY
Status: ON HOLD | COMMUNITY
End: 2021-09-10

## 2021-09-07 RX ORDER — ALBUTEROL SULFATE 90 UG/1
2 AEROSOL, METERED RESPIRATORY (INHALATION) EVERY 6 HOURS PRN
COMMUNITY

## 2021-09-07 RX ORDER — DEXTROSE MONOHYDRATE 25 G/50ML
INJECTION, SOLUTION INTRAVENOUS
Status: COMPLETED
Start: 2021-09-07 | End: 2021-09-07

## 2021-09-07 RX ORDER — ASPIRIN 81 MG/1
81 TABLET ORAL DAILY
Status: DISCONTINUED | OUTPATIENT
Start: 2021-09-08 | End: 2021-09-10

## 2021-09-07 RX ORDER — DEXTROSE AND SODIUM CHLORIDE 5; .45 G/100ML; G/100ML
INJECTION, SOLUTION INTRAVENOUS CONTINUOUS
Status: DISCONTINUED | OUTPATIENT
Start: 2021-09-07 | End: 2021-09-08

## 2021-09-07 RX ORDER — HEPARIN SODIUM 5000 [USP'U]/ML
5000 INJECTION, SOLUTION INTRAVENOUS; SUBCUTANEOUS EVERY 12 HOURS SCHEDULED
Status: DISCONTINUED | OUTPATIENT
Start: 2021-09-07 | End: 2021-09-07 | Stop reason: ALTCHOICE

## 2021-09-07 RX ORDER — METOCLOPRAMIDE HYDROCHLORIDE 5 MG/ML
5 INJECTION INTRAMUSCULAR; INTRAVENOUS EVERY 8 HOURS PRN
Status: DISCONTINUED | OUTPATIENT
Start: 2021-09-07 | End: 2021-09-10

## 2021-09-07 RX ORDER — DEXTROSE MONOHYDRATE 25 G/50ML
50 INJECTION, SOLUTION INTRAVENOUS ONCE
Status: COMPLETED | OUTPATIENT
Start: 2021-09-07 | End: 2021-09-07

## 2021-09-07 RX ORDER — DEXTROSE MONOHYDRATE 25 G/50ML
50 INJECTION, SOLUTION INTRAVENOUS
Status: DISCONTINUED | OUTPATIENT
Start: 2021-09-07 | End: 2021-09-10

## 2021-09-07 RX ORDER — TRAZODONE HYDROCHLORIDE 50 MG/1
50 TABLET ORAL NIGHTLY
Status: DISCONTINUED | OUTPATIENT
Start: 2021-09-07 | End: 2021-09-08

## 2021-09-07 RX ORDER — ATORVASTATIN CALCIUM 20 MG/1
20 TABLET, FILM COATED ORAL NIGHTLY
Status: DISCONTINUED | OUTPATIENT
Start: 2021-09-07 | End: 2021-09-10

## 2021-09-07 RX ORDER — ALBUTEROL SULFATE 90 UG/1
2 AEROSOL, METERED RESPIRATORY (INHALATION) EVERY 6 HOURS PRN
Status: DISCONTINUED | OUTPATIENT
Start: 2021-09-07 | End: 2021-09-10

## 2021-09-07 RX ORDER — TAMSULOSIN HYDROCHLORIDE 0.4 MG/1
0.4 CAPSULE ORAL DAILY
Status: DISCONTINUED | OUTPATIENT
Start: 2021-09-07 | End: 2021-09-10

## 2021-09-07 NOTE — H&P
[facility]    PATIENT'S NAME: ROGERS CURIEL   ATTENDING PHYSICIAN: Adeline Tapia MD   PATIENT ACCOUNT#:   [id number]    LOCATION:  [place] [address]  MEDICAL RECORD #:   [id number]       YOB: 1945  ADMISSION DATE:       09/07/2021    HISTORY AND PHYSICAL EXAMINATION    (Corrected report, 09/08/2021, addendum added)    CHIEF COMPLAINT:  Hypoglycemia and progressive renal failure. HISTORY OF PRESENT ILLNESS:  The patient is a 80-year-old  male who was brought into the emergency department with altered mental status since this morning. He took the last dose of glipizide this morning, per his wife. In the emergency room, noted to have blood glucose of 51 and then 48. Upon arrival, started on D5 half-normal saline and had a D50, 1 ampule. His blood glucose improved to 132 and mental status improved. Chemistry today showed bicarbonate of 19, BUN and creatinine of 44 and 3.36, GFR of 20. His last creatinine on [facility] record was 1.9 in June 2021. The patient will be admitted to the hospital for further management and observation. PAST MEDICAL HISTORY:  Hypertension, hyperlipidemia, diabetes mellitus type 2, chronic kidney disease stage 2 to 3, history of peripheral arterial disease with left lower extremity angioplasty, coronary artery disease status post bare-metal stent, dementia, multiple transient ischemic attacks and cerebrovascular accidents, generalized osteoarthritis, degenerative joint disease of lumbar spine, gout, and benign prostatic hypertrophy. PAST SURGICAL HISTORY:  Remote lumbar laminectomy. MEDICATIONS:   Please see medication reconciliation list.    ALLERGIES:  No known drug allergies. FAMILY HISTORY:  Both parents have coronary artery disease. Mother had endometrial cancer. SOCIAL HISTORY:  Ex-tobacco user. No current tobacco, alcohol, or drug use. Lives with his family.   At baseline, independent in his basic activities of daily living. REVIEW OF SYSTEMS:  Per his wife, patient's appetite has been very sporadic, and lately he eats 1 or 2 meals a day, and he refuses to eat more. His medication regimen has not been changed. The review on his medications, he is on glipizide 10 mg p.o. b.i.d. and losartan/hydrochlorothiazide for blood pressure as well, among other medications. Other 12-point review of systems is negative. PHYSICAL EXAMINATION:  GENERAL:  Alert and oriented to time, place, and person. Confused at baseline. No distress. VITAL SIGNS:  Temperature 97.8, pulse 74, respiratory rate 20, blood pressure 114/73, pulse oximetry 92% on room air. HEENT:  Atraumatic. Oropharynx clear. Dry mucous membranes. Normal hard and soft palate. Eyes:  Anicteric sclerae. Pupils equal, round, reactive. NECK:  Supple. No lymphadenopathy. Trachea midline. Full range of motion. LUNGS:  Clear to auscultation bilaterally. Normal respiratory effort. HEART:  Regular rate and rhythm. S1 and S2 auscultated. No murmur. ABDOMEN:  Soft, nondistended. No tenderness. Positive bowel sounds. EXTREMITIES:  No peripheral edema, clubbing, or cyanosis. NEUROLOGIC:  Motor and sensory intact. No focal findings. ASSESSMENT AND PLAN:  1. Hypoglycemia, most likely related to sulfonylurea, high dose, and progressive renal decline in renal function. 2.   Diabetes mellitus type 2.  3.   Acute on chronic renal insufficiency. 4.   Dementia. Patient will be admitted to the general medical floor. Continue to monitor his Accu-Cheks. Hold losartan/hydrochlorothiazide and glipizide. Monitor his kidney function. D5 half-normal saline, IV fluid infusion. Fall precautions. Further recommendations to follow. ADDENDUM (Job [id number]):    PAST MEDICAL HISTORY:    Per the record, patient has history of recurrent lower extremity DVTs and he is chronically anticoagulated with Eliquis.     Dictated By Les Corona MD  d: 09/07/2021 14:21:32  t: 09/07/2021 14:27:30  [facility] [id number]  /

## 2021-09-07 NOTE — ED INITIAL ASSESSMENT (HPI)
Patient arrived via EMS from home with weakness and hypoglycemia while walking down the stairs. EMS reports BG of 46, oral glucose administered with repeat glucose of 56.

## 2021-09-08 LAB
ANION GAP SERPL CALC-SCNC: 8 MMOL/L (ref 0–18)
BASOPHILS # BLD AUTO: 0.07 X10(3) UL (ref 0–0.2)
BASOPHILS NFR BLD AUTO: 0.8 %
BUN BLD-MCNC: 30 MG/DL (ref 7–18)
BUN/CREAT SERPL: 15 (ref 10–20)
CALCIUM BLD-MCNC: 8.2 MG/DL (ref 8.5–10.1)
CHLORIDE SERPL-SCNC: 110 MMOL/L (ref 98–112)
CO2 SERPL-SCNC: 19 MMOL/L (ref 21–32)
CREAT BLD-MCNC: 2 MG/DL
DEPRECATED RDW RBC AUTO: 43.2 FL (ref 35.1–46.3)
EOSINOPHIL # BLD AUTO: 0.27 X10(3) UL (ref 0–0.7)
EOSINOPHIL NFR BLD AUTO: 3.2 %
ERYTHROCYTE [DISTWIDTH] IN BLOOD BY AUTOMATED COUNT: 12.6 % (ref 11–15)
GLUCOSE BLD-MCNC: 114 MG/DL (ref 70–99)
GLUCOSE BLDC GLUCOMTR-MCNC: 106 MG/DL (ref 70–99)
GLUCOSE BLDC GLUCOMTR-MCNC: 109 MG/DL (ref 70–99)
GLUCOSE BLDC GLUCOMTR-MCNC: 119 MG/DL (ref 70–99)
GLUCOSE BLDC GLUCOMTR-MCNC: 134 MG/DL (ref 70–99)
HCT VFR BLD AUTO: 42.7 %
HGB BLD-MCNC: 14.4 G/DL
IMM GRANULOCYTES # BLD AUTO: 0.06 X10(3) UL (ref 0–1)
IMM GRANULOCYTES NFR BLD: 0.7 %
LYMPHOCYTES # BLD AUTO: 2.78 X10(3) UL (ref 1–4)
LYMPHOCYTES NFR BLD AUTO: 33.2 %
MCH RBC QN AUTO: 31.4 PG (ref 26–34)
MCHC RBC AUTO-ENTMCNC: 33.7 G/DL (ref 31–37)
MCV RBC AUTO: 93.2 FL
MONOCYTES # BLD AUTO: 0.85 X10(3) UL (ref 0.1–1)
MONOCYTES NFR BLD AUTO: 10.1 %
NEUTROPHILS # BLD AUTO: 4.35 X10 (3) UL (ref 1.5–7.7)
NEUTROPHILS # BLD AUTO: 4.35 X10(3) UL (ref 1.5–7.7)
NEUTROPHILS NFR BLD AUTO: 52 %
OSMOLALITY SERPL CALC.SUM OF ELEC: 291 MOSM/KG (ref 275–295)
PLATELET # BLD AUTO: 134 10(3)UL (ref 150–450)
POTASSIUM SERPL-SCNC: 3.8 MMOL/L (ref 3.5–5.1)
RBC # BLD AUTO: 4.58 X10(6)UL
SODIUM SERPL-SCNC: 137 MMOL/L (ref 136–145)
WBC # BLD AUTO: 8.4 X10(3) UL (ref 4–11)

## 2021-09-08 PROCEDURE — 99233 SBSQ HOSP IP/OBS HIGH 50: CPT | Performed by: HOSPITALIST

## 2021-09-08 RX ORDER — MIRTAZAPINE 15 MG/1
7.5 TABLET, FILM COATED ORAL NIGHTLY
Status: DISCONTINUED | OUTPATIENT
Start: 2021-09-08 | End: 2021-09-10

## 2021-09-08 RX ORDER — HYDRALAZINE HYDROCHLORIDE 20 MG/ML
10 INJECTION INTRAMUSCULAR; INTRAVENOUS EVERY 6 HOURS PRN
Status: DISCONTINUED | OUTPATIENT
Start: 2021-09-08 | End: 2021-09-10

## 2021-09-08 NOTE — CM/SW NOTE
PT recommending [facility] on dc. [facility] referral sent via [employer]. Elisabet Leo Requested. **Will need to meet with pt and wife to discuss POLST and dc plan, possible HH. Plan: HH vs [facility] when stable. Shanika López.  Alden Yusuf RN, BSN  Nurse   [phone]

## 2021-09-08 NOTE — PLAN OF CARE
Problem: Patient Centered Care  Goal: Patient preferences are identified and integrated in the patient's plan of care  Description: Interventions:  - What would you like us to know as we care for you? I live at home with my wife. - Provide timely, complete, and accurate information to patient/family  - Incorporate patient and family knowledge, values, beliefs, and cultural backgrounds into the planning and delivery of care  - Encourage patient/family to participate in care and decision-making at the level they choose  - Honor patient and family perspectives and choices  Outcome: Progressing     Problem: Patient/Family Goals  Goal: Patient/Family Long Term Goal  Description: Patient's Long Term Goal: Be discharged from hospital.    Interventions:  - Follow MD recommendations  - See additional Care Plan goals for specific interventions  Outcome: Progressing  Goal: Patient/Family Short Term Goal  Description: Patient's Short Term Goal: Resolve hypoglycemia.     Interventions:   - Accuchecks AC&HS  - IV fluids as orderd  - See additional Care Plan goals for specific interventions  Outcome: Progressing     Problem: METABOLIC/FLUID AND ELECTROLYTES - ADULT  Goal: Glucose maintained within prescribed range  Description: INTERVENTIONS:  - Monitor Blood Glucose as ordered  - Assess for signs and symptoms of hyperglycemia and hypoglycemia  - Administer ordered medications to maintain glucose within target range  - Assess barriers to adequate nutritional intake and initiate nutrition consult as needed  - Instruct patient on self management of diabetes  Outcome: Progressing  Goal: Electrolytes maintained within normal limits  Description: INTERVENTIONS:  - Monitor labs and rhythm and assess patient for signs and symptoms of electrolyte imbalances  - Administer electrolyte replacement as ordered  - Monitor response to electrolyte replacements, including rhythm and repeat lab results as appropriate  - Fluid restriction as ordered  - Instruct patient on fluid and nutrition restrictions as appropriate  Outcome: Progressing  Goal: Hemodynamic stability and optimal renal function maintained  Description: INTERVENTIONS:  - Monitor labs and assess for signs and symptoms of volume excess or deficit  - Monitor intake, output and patient weight  - Monitor urine specific gravity, serum osmolarity and serum sodium as indicated or ordered  - Monitor response to interventions for patient's volume status, including labs, urine output, blood pressure (other measures as available)  - Encourage oral intake as appropriate  - Instruct patient on fluid and nutrition restrictions as appropriate  Outcome: Progressing     Problem: SAFETY ADULT - FALL  Goal: Free from fall injury  Description: INTERVENTIONS:  - Assess pt frequently for physical needs  - Identify cognitive and physical deficits and behaviors that affect risk of falls.   - [place] fall precautions as indicated by assessment.  - Educate pt/family on patient safety including physical limitations  - Instruct pt to call for assistance with activity based on assessment  - Modify environment to reduce risk of injury  - Provide assistive devices as appropriate  - Consider OT/PT consult to assist with strengthening/mobility  - Encourage toileting schedule  Outcome: Progressing   Patient a/ox2; denies pain; VSS; up to chair/bed with walker +1 assist; accu-checks AC/HS have been stable today; family at bedside

## 2021-09-08 NOTE — PLAN OF CARE
Problem: Patient Centered Care  Goal: Patient preferences are identified and integrated in the patient's plan of care  Description: Interventions:  - What would you like us to know as we care for you? I live at home with my wife. - Provide timely, complete, and accurate information to patient/family  - Incorporate patient and family knowledge, values, beliefs, and cultural backgrounds into the planning and delivery of care  - Encourage patient/family to participate in care and decision-making at the level they choose  - Honor patient and family perspectives and choices  Outcome: Progressing     Problem: Patient/Family Goals  Goal: Patient/Family Long Term Goal  Description: Patient's Long Term Goal: Be discharged from hospital.    Interventions:  - Follow MD recommendations  - See additional Care Plan goals for specific interventions  Outcome: Progressing  Goal: Patient/Family Short Term Goal  Description: Patient's Short Term Goal: Resolve hypoglycemia.     Interventions:   - Accuchecks AC&HS  - IV fluids as orderd  - See additional Care Plan goals for specific interventions  Outcome: Progressing

## 2021-09-08 NOTE — PLAN OF CARE
Problem: Patient Centered Care  Goal: Patient preferences are identified and integrated in the patient's plan of care  Description: Interventions:  - What would you like us to know as we care for you? I live at home with my wife. - Provide timely, complete, and accurate information to patient/family  - Incorporate patient and family knowledge, values, beliefs, and cultural backgrounds into the planning and delivery of care  - Encourage patient/family to participate in care and decision-making at the level they choose  - Honor patient and family perspectives and choices  Outcome: Progressing     Problem: Patient/Family Goals  Goal: Patient/Family Long Term Goal  Description: Patient's Long Term Goal: Be discharged from hospital.    Interventions:  - Follow MD recommendations  - See additional Care Plan goals for specific interventions  Outcome: Progressing  Goal: Patient/Family Short Term Goal  Description: Patient's Short Term Goal: Resolve hypoglycemia.     Interventions:   - Accuchecks AC&HS  - IV fluids as orderd  - See additional Care Plan goals for specific interventions  Outcome: Progressing     Problem: METABOLIC/FLUID AND ELECTROLYTES - ADULT  Goal: Glucose maintained within prescribed range  Description: INTERVENTIONS:  - Monitor Blood Glucose as ordered  - Assess for signs and symptoms of hyperglycemia and hypoglycemia  - Administer ordered medications to maintain glucose within target range  - Assess barriers to adequate nutritional intake and initiate nutrition consult as needed  - Instruct patient on self management of diabetes  Outcome: Progressing  Goal: Electrolytes maintained within normal limits  Description: INTERVENTIONS:  - Monitor labs and rhythm and assess patient for signs and symptoms of electrolyte imbalances  - Administer electrolyte replacement as ordered  - Monitor response to electrolyte replacements, including rhythm and repeat lab results as appropriate  - Fluid restriction as ordered  - Instruct patient on fluid and nutrition restrictions as appropriate  Outcome: Progressing  Goal: Hemodynamic stability and optimal renal function maintained  Description: INTERVENTIONS:  - Monitor labs and assess for signs and symptoms of volume excess or deficit  - Monitor intake, output and patient weight  - Monitor urine specific gravity, serum osmolarity and serum sodium as indicated or ordered  - Monitor response to interventions for patient's volume status, including labs, urine output, blood pressure (other measures as available)  - Encourage oral intake as appropriate  - Instruct patient on fluid and nutrition restrictions as appropriate  Outcome: Progressing     Problem: SAFETY ADULT - FALL  Goal: Free from fall injury  Description: INTERVENTIONS:  - Assess pt frequently for physical needs  - Identify cognitive and physical deficits and behaviors that affect risk of falls. - [place] fall precautions as indicated by assessment.  - Educate pt/family on patient safety including physical limitations  - Instruct pt to call for assistance with activity based on assessment  - Modify environment to reduce risk of injury  - Provide assistive devices as appropriate  - Consider OT/PT consult to assist with strengthening/mobility  - Encourage toileting schedule  Outcome: Progressing   IVF infusing. Call light within reach, bed in lowest position, alarms on, and nonskid socks on.

## 2021-09-09 PROBLEM — Z71.89 GOALS OF CARE, COUNSELING/DISCUSSION: Status: ACTIVE | Noted: 2021-09-09

## 2021-09-09 PROBLEM — Z71.89 ADVANCE CARE PLANNING: Status: ACTIVE | Noted: 2021-09-09

## 2021-09-09 LAB
ANION GAP SERPL CALC-SCNC: 8 MMOL/L (ref 0–18)
BASOPHILS # BLD AUTO: 0.06 X10(3) UL (ref 0–0.2)
BASOPHILS NFR BLD AUTO: 0.8 %
BUN BLD-MCNC: 37 MG/DL (ref 7–18)
BUN/CREAT SERPL: 16.4 (ref 10–20)
CALCIUM BLD-MCNC: 9.1 MG/DL (ref 8.5–10.1)
CHLORIDE SERPL-SCNC: 113 MMOL/L (ref 98–112)
CO2 SERPL-SCNC: 19 MMOL/L (ref 21–32)
CREAT BLD-MCNC: 2.26 MG/DL
DEPRECATED RDW RBC AUTO: 42.9 FL (ref 35.1–46.3)
EOSINOPHIL # BLD AUTO: 0.26 X10(3) UL (ref 0–0.7)
EOSINOPHIL NFR BLD AUTO: 3.7 %
ERYTHROCYTE [DISTWIDTH] IN BLOOD BY AUTOMATED COUNT: 12.5 % (ref 11–15)
GLUCOSE BLD-MCNC: 102 MG/DL (ref 70–99)
GLUCOSE BLDC GLUCOMTR-MCNC: 105 MG/DL (ref 70–99)
GLUCOSE BLDC GLUCOMTR-MCNC: 114 MG/DL (ref 70–99)
GLUCOSE BLDC GLUCOMTR-MCNC: 117 MG/DL (ref 70–99)
GLUCOSE BLDC GLUCOMTR-MCNC: 148 MG/DL (ref 70–99)
HCT VFR BLD AUTO: 47.1 %
HGB BLD-MCNC: 16 G/DL
IMM GRANULOCYTES # BLD AUTO: 0.03 X10(3) UL (ref 0–1)
IMM GRANULOCYTES NFR BLD: 0.4 %
LYMPHOCYTES # BLD AUTO: 2.68 X10(3) UL (ref 1–4)
LYMPHOCYTES NFR BLD AUTO: 37.7 %
MAGNESIUM SERPL-MCNC: 2 MG/DL (ref 1.6–2.6)
MCH RBC QN AUTO: 31.5 PG (ref 26–34)
MCHC RBC AUTO-ENTMCNC: 34 G/DL (ref 31–37)
MCV RBC AUTO: 92.7 FL
MONOCYTES # BLD AUTO: 0.57 X10(3) UL (ref 0.1–1)
MONOCYTES NFR BLD AUTO: 8 %
NEUTROPHILS # BLD AUTO: 3.5 X10 (3) UL (ref 1.5–7.7)
NEUTROPHILS # BLD AUTO: 3.5 X10(3) UL (ref 1.5–7.7)
NEUTROPHILS NFR BLD AUTO: 49.4 %
OSMOLALITY SERPL CALC.SUM OF ELEC: 299 MOSM/KG (ref 275–295)
PLATELET # BLD AUTO: 148 10(3)UL (ref 150–450)
POTASSIUM SERPL-SCNC: 4.2 MMOL/L (ref 3.5–5.1)
RBC # BLD AUTO: 5.08 X10(6)UL
SODIUM SERPL-SCNC: 140 MMOL/L (ref 136–145)
WBC # BLD AUTO: 7.1 X10(3) UL (ref 4–11)

## 2021-09-09 PROCEDURE — 99233 SBSQ HOSP IP/OBS HIGH 50: CPT | Performed by: HOSPITALIST

## 2021-09-09 PROCEDURE — 99222 1ST HOSP IP/OBS MODERATE 55: CPT | Performed by: NURSE PRACTITIONER

## 2021-09-09 NOTE — PLAN OF CARE
Problem: Patient Centered Care  Goal: Patient preferences are identified and integrated in the patient's plan of care  Description: Interventions:  - What would you like us to know as we care for you? I live at home with my wife. - Provide timely, complete, and accurate information to patient/family  - Incorporate patient and family knowledge, values, beliefs, and cultural backgrounds into the planning and delivery of care  - Encourage patient/family to participate in care and decision-making at the level they choose  - Honor patient and family perspectives and choices  Outcome: Progressing     Problem: Patient/Family Goals  Goal: Patient/Family Long Term Goal  Description: Patient's Long Term Goal: Be discharged from hospital.    Interventions:  - Follow MD recommendations  - See additional Care Plan goals for specific interventions  Outcome: Progressing  Goal: Patient/Family Short Term Goal  Description: Patient's Short Term Goal: Resolve hypoglycemia.     Interventions:   - Accuchecks AC&HS  - IV fluids as orderd  - See additional Care Plan goals for specific interventions  Outcome: Progressing     Problem: METABOLIC/FLUID AND ELECTROLYTES - ADULT  Goal: Glucose maintained within prescribed range  Description: INTERVENTIONS:  - Monitor Blood Glucose as ordered  - Assess for signs and symptoms of hyperglycemia and hypoglycemia  - Administer ordered medications to maintain glucose within target range  - Assess barriers to adequate nutritional intake and initiate nutrition consult as needed  - Instruct patient on self management of diabetes  Outcome: Progressing  Goal: Electrolytes maintained within normal limits  Description: INTERVENTIONS:  - Monitor labs and rhythm and assess patient for signs and symptoms of electrolyte imbalances  - Administer electrolyte replacement as ordered  - Monitor response to electrolyte replacements, including rhythm and repeat lab results as appropriate  - Fluid restriction as ordered  - Instruct patient on fluid and nutrition restrictions as appropriate  Outcome: Progressing  Goal: Hemodynamic stability and optimal renal function maintained  Description: INTERVENTIONS:  - Monitor labs and assess for signs and symptoms of volume excess or deficit  - Monitor intake, output and patient weight  - Monitor urine specific gravity, serum osmolarity and serum sodium as indicated or ordered  - Monitor response to interventions for patient's volume status, including labs, urine output, blood pressure (other measures as available)  - Encourage oral intake as appropriate  - Instruct patient on fluid and nutrition restrictions as appropriate  Outcome: Progressing     Problem: SAFETY ADULT - FALL  Goal: Free from fall injury  Description: INTERVENTIONS:  - Assess pt frequently for physical needs  - Identify cognitive and physical deficits and behaviors that affect risk of falls. - [place] fall precautions as indicated by assessment.  - Educate pt/family on patient safety including physical limitations  - Instruct pt to call for assistance with activity based on assessment  - Modify environment to reduce risk of injury  - Provide assistive devices as appropriate  - Consider OT/PT consult to assist with strengthening/mobility  - Encourage toileting schedule  Outcome: Progressing   AO x 2-3; BG wnl; safety measured maintained; bed and chair alarm active and audible; call light within reach; non-skid socks worn; family at bedside.

## 2021-09-09 NOTE — CM/SW NOTE
09/09/21 1300   CM/SW Referral Data   Referral Source Physician   Reason for Referral Discharge planning; Other  (POLST)   Informant Spouse/Significant Other   Pertinent Medical Hx   Does patient have an established PCP? Yes   Patient Info   Patient's Current Mental Status at Time of Assessment Memory Impairments   Patient's Home Environment Condo/Apt no elevator   Number of Levels in Home 2   Number of Stair in Home 8 in from front / 10-12 back   Patient lives with Spouse/Significant other   Patient Status Prior to Admission   Independent with ADLs and Mobility No   Pt. requires assistance with Housework;Driving;Meals; Bathing; Ambulating;Dressing;Medications; Toileting;Finances   Services in place prior to admission DME/Supplies at home   Type of DME/Supplies Straight Cane;Standard Walker; Wheelchair   Discharge Needs   Anticipated D/C needs Subacute rehab   Services Requested   DON Screen Requested Yes   Choice of Post-Acute Provider   Informed patient of right to choose their preferred provider Yes   List of appropriate post-acute services provided to patient/family with quality data Yes   Patient/family choice TBD     CM received MDO to discuss dc planning and POLST. Met with wife at bedside. Dr. Tila Chery present. Pt is a DN[facility]/ [facility] at this time, pt/family not ready to sign POLST. Palliative care now on consult. POLST  Form on chart. **MD/NP to complete with pt and family. Pt wife Cydney Shore is open to rehab on dc. [facility] ref sent via [employer]. Choice list  Provided. Fernandez to discuss with children options and to call this cm later today with choice. Anticipated DC date 9/10    1530: f/u with wife regarding choice. She is has not chosen yet. She needed to talk to her son. Plan: [facility] on dc. Facility TBD. / to remain available for support and/or discharge planning. Hilary Miller.  Kael Augustine RN, BSN  Nurse   [phone]

## 2021-09-10 VITALS
BODY MASS INDEX: 25.18 KG/M2 | TEMPERATURE: 97 F | SYSTOLIC BLOOD PRESSURE: 138 MMHG | HEIGHT: 69 IN | WEIGHT: 170 LBS | RESPIRATION RATE: 18 BRPM | HEART RATE: 93 BPM | DIASTOLIC BLOOD PRESSURE: 61 MMHG | OXYGEN SATURATION: 95 %

## 2021-09-10 LAB
ANION GAP SERPL CALC-SCNC: 9 MMOL/L (ref 0–18)
BASOPHILS # BLD AUTO: 0.08 X10(3) UL (ref 0–0.2)
BASOPHILS NFR BLD AUTO: 1 %
BUN BLD-MCNC: 46 MG/DL (ref 7–18)
BUN/CREAT SERPL: 20 (ref 10–20)
CALCIUM BLD-MCNC: 8.9 MG/DL (ref 8.5–10.1)
CHLORIDE SERPL-SCNC: 114 MMOL/L (ref 98–112)
CO2 SERPL-SCNC: 18 MMOL/L (ref 21–32)
CREAT BLD-MCNC: 2.3 MG/DL
DEPRECATED RDW RBC AUTO: 43.1 FL (ref 35.1–46.3)
EOSINOPHIL # BLD AUTO: 0.3 X10(3) UL (ref 0–0.7)
EOSINOPHIL NFR BLD AUTO: 3.6 %
ERYTHROCYTE [DISTWIDTH] IN BLOOD BY AUTOMATED COUNT: 12.7 % (ref 11–15)
GLUCOSE BLD-MCNC: 120 MG/DL (ref 70–99)
GLUCOSE BLDC GLUCOMTR-MCNC: 125 MG/DL (ref 70–99)
GLUCOSE BLDC GLUCOMTR-MCNC: 125 MG/DL (ref 70–99)
GLUCOSE BLDC GLUCOMTR-MCNC: 92 MG/DL (ref 70–99)
HCT VFR BLD AUTO: 44.8 %
HGB BLD-MCNC: 15.3 G/DL
IMM GRANULOCYTES # BLD AUTO: 0.05 X10(3) UL (ref 0–1)
IMM GRANULOCYTES NFR BLD: 0.6 %
LYMPHOCYTES # BLD AUTO: 2.74 X10(3) UL (ref 1–4)
LYMPHOCYTES NFR BLD AUTO: 33 %
MAGNESIUM SERPL-MCNC: 2 MG/DL (ref 1.6–2.6)
MCH RBC QN AUTO: 31.5 PG (ref 26–34)
MCHC RBC AUTO-ENTMCNC: 34.2 G/DL (ref 31–37)
MCV RBC AUTO: 92.2 FL
MONOCYTES # BLD AUTO: 0.72 X10(3) UL (ref 0.1–1)
MONOCYTES NFR BLD AUTO: 8.7 %
NEUTROPHILS # BLD AUTO: 4.41 X10 (3) UL (ref 1.5–7.7)
NEUTROPHILS # BLD AUTO: 4.41 X10(3) UL (ref 1.5–7.7)
NEUTROPHILS NFR BLD AUTO: 53.1 %
OSMOLALITY SERPL CALC.SUM OF ELEC: 305 MOSM/KG (ref 275–295)
PLATELET # BLD AUTO: 172 10(3)UL (ref 150–450)
POTASSIUM SERPL-SCNC: 4.2 MMOL/L (ref 3.5–5.1)
RBC # BLD AUTO: 4.86 X10(6)UL
SODIUM SERPL-SCNC: 141 MMOL/L (ref 136–145)
WBC # BLD AUTO: 8.3 X10(3) UL (ref 4–11)

## 2021-09-10 PROCEDURE — 99497 ADVNCD CARE PLAN 30 MIN: CPT | Performed by: NURSE PRACTITIONER

## 2021-09-10 PROCEDURE — 99232 SBSQ HOSP IP/OBS MODERATE 35: CPT | Performed by: HOSPITALIST

## 2021-09-10 PROCEDURE — 99231 SBSQ HOSP IP/OBS SF/LOW 25: CPT | Performed by: NURSE PRACTITIONER

## 2021-09-10 RX ORDER — MIRTAZAPINE 7.5 MG/1
15 TABLET, FILM COATED ORAL NIGHTLY
Qty: 30 TABLET | Refills: 0 | Status: SHIPPED | OUTPATIENT
Start: 2021-09-10

## 2021-09-10 NOTE — PLAN OF CARE
Problem: Patient Centered Care  Goal: Patient preferences are identified and integrated in the patient's plan of care  Description: Interventions:  - What would you like us to know as we care for you? I live at home with my wife. - Provide timely, complete, and accurate information to patient/family  - Incorporate patient and family knowledge, values, beliefs, and cultural backgrounds into the planning and delivery of care  - Encourage patient/family to participate in care and decision-making at the level they choose  - Honor patient and family perspectives and choices  Outcome: Progressing     Problem: Patient/Family Goals  Goal: Patient/Family Long Term Goal  Description: Patient's Long Term Goal: Be discharged from hospital.    Interventions:  - Follow MD recommendations  - See additional Care Plan goals for specific interventions  Outcome: Progressing  Goal: Patient/Family Short Term Goal  Description: Patient's Short Term Goal: Resolve hypoglycemia.     Interventions:   - Accuchecks AC&HS  - IV fluids as orderd  - See additional Care Plan goals for specific interventions  Outcome: Progressing     Problem: METABOLIC/FLUID AND ELECTROLYTES - ADULT  Goal: Glucose maintained within prescribed range  Description: INTERVENTIONS:  - Monitor Blood Glucose as ordered  - Assess for signs and symptoms of hyperglycemia and hypoglycemia  - Administer ordered medications to maintain glucose within target range  - Assess barriers to adequate nutritional intake and initiate nutrition consult as needed  - Instruct patient on self management of diabetes  Outcome: Progressing  Goal: Electrolytes maintained within normal limits  Description: INTERVENTIONS:  - Monitor labs and rhythm and assess patient for signs and symptoms of electrolyte imbalances  - Administer electrolyte replacement as ordered  - Monitor response to electrolyte replacements, including rhythm and repeat lab results as appropriate  - Fluid restriction as ordered  - Instruct patient on fluid and nutrition restrictions as appropriate  Outcome: Progressing  Goal: Hemodynamic stability and optimal renal function maintained  Description: INTERVENTIONS:  - Monitor labs and assess for signs and symptoms of volume excess or deficit  - Monitor intake, output and patient weight  - Monitor urine specific gravity, serum osmolarity and serum sodium as indicated or ordered  - Monitor response to interventions for patient's volume status, including labs, urine output, blood pressure (other measures as available)  - Encourage oral intake as appropriate  - Instruct patient on fluid and nutrition restrictions as appropriate  Outcome: Progressing     Problem: SAFETY ADULT - FALL  Goal: Free from fall injury  Description: INTERVENTIONS:  - Assess pt frequently for physical needs  - Identify cognitive and physical deficits and behaviors that affect risk of falls. - [place] fall precautions as indicated by assessment.  - Educate pt/family on patient safety including physical limitations  - Instruct pt to call for assistance with activity based on assessment  - Modify environment to reduce risk of injury  - Provide assistive devices as appropriate  - Consider OT/PT consult to assist with strengthening/mobility  - Encourage toileting schedule  Outcome: Progressing     Monitoring vital signs- stable at this time. No acute distress noted at this time. Wife at bedside. Fall precautions in place. Blood glucose monitoring. Frequent rounding by nursing staff.

## 2021-09-10 NOTE — PALLIATIVE CARE NOTE
[facility] received referral for community palliative care for patient at [address] verified.  Patient's wife agreeable to services once patient leaves [facility].     Barron Joe  Palliative Liaison  [id number]

## 2021-09-10 NOTE — CM/SW NOTE
ALISHA following for discharge planning. ALISHA notified that plan is JUAN JOSÉ and choice is [place] of [address] via [address] [place]. SW received MDO for community palliative care. Referred to [facility] liaison. Plan: [facility] of [place] [facility] w/ [facility] pending medical clearance    Addendum 9/10/2021:  SW received MDO for discharge. Notified [facility] liaison who confirmed admission for today. Coordinated patient's discharge with liaison. Plan: [facility] of Ivette Martinez w/ [facility]  Phone: [phone]  Ambulance arranged via [facility] for 6 PM. PCS complete. RN informed. VM left for patient's wife to notify of DC.    ALISHA/MARLENE to remain available for support and/or discharge planning.      MAGALY Winn, [facility]   [id number]

## 2021-09-10 NOTE — DISCHARGE PLANNING
Report called to April RN at [facility].  IV removed    To [place] campus via [facility] at [facility]

## 2021-09-14 ENCOUNTER — EXTERNAL FACILITY (OUTPATIENT)
Dept: INTERNAL MEDICINE CLINIC | Facility: CLINIC | Age: 76
End: 2021-09-14

## 2021-09-14 DIAGNOSIS — R41.82 ALTERED MENTAL STATUS, UNSPECIFIED ALTERED MENTAL STATUS TYPE: ICD-10-CM

## 2021-09-14 DIAGNOSIS — N17.9 ACUTE RENAL FAILURE, UNSPECIFIED ACUTE RENAL FAILURE TYPE (HCC): ICD-10-CM

## 2021-09-14 DIAGNOSIS — E16.2 HYPOGLYCEMIA: ICD-10-CM

## 2021-09-14 PROCEDURE — 99306 1ST NF CARE HIGH MDM 50: CPT | Performed by: INTERNAL MEDICINE

## 2021-09-15 ENCOUNTER — INITIAL APN SNF VISIT (OUTPATIENT)
Dept: INTERNAL MEDICINE CLINIC | Facility: SKILLED NURSING FACILITY | Age: 76
End: 2021-09-15

## 2021-09-15 VITALS
SYSTOLIC BLOOD PRESSURE: 154 MMHG | OXYGEN SATURATION: 98 % | BODY MASS INDEX: 25 KG/M2 | TEMPERATURE: 98 F | DIASTOLIC BLOOD PRESSURE: 88 MMHG | RESPIRATION RATE: 20 BRPM | WEIGHT: 170 LBS | HEART RATE: 86 BPM

## 2021-09-15 DIAGNOSIS — N17.9 ACUTE RENAL FAILURE, UNSPECIFIED ACUTE RENAL FAILURE TYPE (HCC): ICD-10-CM

## 2021-09-15 DIAGNOSIS — E16.2 HYPOGLYCEMIA: ICD-10-CM

## 2021-09-15 DIAGNOSIS — E63.9 POOR NUTRITION: ICD-10-CM

## 2021-09-15 DIAGNOSIS — G93.40 ENCEPHALOPATHY: ICD-10-CM

## 2021-09-15 DIAGNOSIS — R41.82 ALTERED MENTAL STATUS, UNSPECIFIED ALTERED MENTAL STATUS TYPE: ICD-10-CM

## 2021-09-15 PROCEDURE — 1123F ACP DISCUSS/DSCN MKR DOCD: CPT | Performed by: NURSE PRACTITIONER

## 2021-09-15 PROCEDURE — 1111F DSCHRG MED/CURRENT MED MERGE: CPT | Performed by: NURSE PRACTITIONER

## 2021-09-15 PROCEDURE — 99310 SBSQ NF CARE HIGH MDM 45: CPT | Performed by: NURSE PRACTITIONER

## 2021-09-15 NOTE — PROGRESS NOTES
Sandeep [facility]  : 1945  Age 77year old  male patient is admitted to [address] [place] [address] 9/10/21 for rehab and strengthening     Chief complaint:Hypoglycemia ,Renal failure      [facility]note 2[facility] admission : 21 to 9/10/21     HPI  71-year-old  male who was antoni History of stomach ulcers    • HTN    • Incontinence    • Neuropathic pain     neuropathic pain of both legs   • PVD (peripheral vascular disease) (HCC)     pvd   • Stroke [facility])      Past Surgical History:   Procedure Laterality Date   • BACK SURGERY     • SYSTEMS:  GENERAL HEALTH:feels well otherwise  SKIN: denies any unusual skin lesions or rashes  WOUNDS: none  EYES:no visual complaints or deficits  HENT: denies nasal congestion, sinus pain or sore throat;  RESPIRATORY: denies shortness of breath, wheezin Acute Metabolic Encephalopahty  -with baseline dementia likely  -secondary to hypoglycemia  -improved  -Stay off oral hypoglycemics for now   -accu checks bid, 122  -alert, oriented x 2 to 3, a little forgetful   -PT/OT/Speech  -tylenol prn q 6      2.  Hyp

## 2021-09-16 ENCOUNTER — SNF VISIT (OUTPATIENT)
Dept: INTERNAL MEDICINE CLINIC | Facility: SKILLED NURSING FACILITY | Age: 76
End: 2021-09-16

## 2021-09-16 VITALS
WEIGHT: 170 LBS | RESPIRATION RATE: 20 BRPM | HEART RATE: 86 BPM | OXYGEN SATURATION: 98 % | TEMPERATURE: 98 F | DIASTOLIC BLOOD PRESSURE: 88 MMHG | SYSTOLIC BLOOD PRESSURE: 154 MMHG | BODY MASS INDEX: 25 KG/M2

## 2021-09-16 DIAGNOSIS — E16.2 HYPOGLYCEMIA: ICD-10-CM

## 2021-09-16 DIAGNOSIS — R45.1 RESTLESS: ICD-10-CM

## 2021-09-16 DIAGNOSIS — R41.82 ALTERED MENTAL STATUS, UNSPECIFIED ALTERED MENTAL STATUS TYPE: ICD-10-CM

## 2021-09-16 DIAGNOSIS — N17.9 ACUTE RENAL FAILURE, UNSPECIFIED ACUTE RENAL FAILURE TYPE (HCC): ICD-10-CM

## 2021-09-16 DIAGNOSIS — E11.69 TYPE 2 DIABETES MELLITUS WITH OTHER SPECIFIED COMPLICATION, WITHOUT LONG-TERM CURRENT USE OF INSULIN (HCC): ICD-10-CM

## 2021-09-16 DIAGNOSIS — R53.1 GENERALIZED WEAKNESS: ICD-10-CM

## 2021-09-16 PROCEDURE — 99309 SBSQ NF CARE MODERATE MDM 30: CPT | Performed by: NURSE PRACTITIONER

## 2021-09-16 NOTE — PROGRESS NOTES
Mitzi Turpin  : 1945  Age 77year old  male patient is admitted to    [address] [place] [address] 9/10/21 for rehab and strengthening      Chief complaint:Hypoglycemia ,Renal failure       [facility] admission : 21 to 9/10/21      HPI  67-year-old  male who was updated     Current Outpatient Medications   Medication Sig Dispense Refill   • mirtazapine 7.5 MG Oral Tab Take 2 tablets (15 mg total) by mouth nightly.  30 tablet 0   • Albuterol Sulfate  (90 Base) MCG/ACT Inhalation Aero Soln Inhale 2 puffs into none  SKIN: no rashes, no suspicious lesions  WOUND: none  EYES: PERRLA, EOMI, sclera anicteric, conjunctiva normal; there is no nystagmus, no drainage from eyes  HENT: normocephalic; normal nose, no nasal drainage, mucous membranes pink, moist, pharynx no losartan/hctz at home  -holding with renal failure  -Blood pressure normotensive without medications   -vs q shift      COPD  -cont trelegy ellipta q day  -cont albuterol puffs 2 q 6 prn   -Pulm and RT consulted in rehab , to eval and follow   -cpm     Hx.

## 2021-09-17 ENCOUNTER — EXTERNAL FACILITY (OUTPATIENT)
Dept: INTERNAL MEDICINE CLINIC | Facility: CLINIC | Age: 76
End: 2021-09-17

## 2021-09-17 DIAGNOSIS — N17.9 ACUTE RENAL FAILURE, UNSPECIFIED ACUTE RENAL FAILURE TYPE (HCC): ICD-10-CM

## 2021-09-17 DIAGNOSIS — I10 HYPERTENSION, UNSPECIFIED TYPE: ICD-10-CM

## 2021-09-17 DIAGNOSIS — E87.2 METABOLIC ACIDOSIS: ICD-10-CM

## 2021-09-17 PROCEDURE — 99309 SBSQ NF CARE MODERATE MDM 30: CPT | Performed by: INTERNAL MEDICINE

## 2021-09-19 NOTE — PROGRESS NOTES
Type in person  Location [facility]  Date of service September 17, 2017    Patient seen and examined. No hypoglycemic episodes. Blood sugars are coming up. Blood pressure is up. Patient is progressing with physical therapy.  He reports that he is happy w

## 2021-09-19 NOTE — PROGRESS NOTES
Type in person  Location [facility]  Date of service September 14, 2017    Admission note       32-year-old -American male with a past medical history of diabetes, hypertension, dyslipidemia, CKD, peripheral vascular disease status post PTA, escobar discharge from [facility] was 2.3.       Diabetes-meds on hold because of hypoglycemia monitor      Hypertension well-controlled      COPD-stable      CVA-continue antiplatelets and statins      Dyslipidemia continue statins    Patient is on Eliquis-we will contin

## 2021-09-20 ENCOUNTER — EXTERNAL FACILITY (OUTPATIENT)
Dept: INTERNAL MEDICINE CLINIC | Facility: CLINIC | Age: 76
End: 2021-09-20

## 2021-09-20 DIAGNOSIS — E11.65 TYPE 2 DIABETES MELLITUS WITH HYPERGLYCEMIA, WITHOUT LONG-TERM CURRENT USE OF INSULIN (HCC): ICD-10-CM

## 2021-09-20 DIAGNOSIS — I10 PRIMARY HYPERTENSION: ICD-10-CM

## 2021-09-20 DIAGNOSIS — E87.2 METABOLIC ACIDOSIS: ICD-10-CM

## 2021-09-20 PROCEDURE — 99308 SBSQ NF CARE LOW MDM 20: CPT | Performed by: INTERNAL MEDICINE

## 2021-09-20 NOTE — PROGRESS NOTES
Type in person  Location [facility]  Date of service September 20, 2017    Patient is in good spirits. He has no complaints. Blood sugars are coming up. Blood pressure is slightly in the higher side. He denies any pain. He is eating pretty good.

## 2021-09-21 ENCOUNTER — SNF VISIT (OUTPATIENT)
Dept: INTERNAL MEDICINE CLINIC | Facility: SKILLED NURSING FACILITY | Age: 76
End: 2021-09-21

## 2021-09-21 VITALS
BODY MASS INDEX: 25 KG/M2 | SYSTOLIC BLOOD PRESSURE: 148 MMHG | WEIGHT: 170 LBS | OXYGEN SATURATION: 98 % | TEMPERATURE: 97 F | DIASTOLIC BLOOD PRESSURE: 78 MMHG | RESPIRATION RATE: 20 BRPM | HEART RATE: 80 BPM

## 2021-09-21 DIAGNOSIS — G93.40 ENCEPHALOPATHY: ICD-10-CM

## 2021-09-21 DIAGNOSIS — R41.82 ALTERED MENTAL STATUS, UNSPECIFIED ALTERED MENTAL STATUS TYPE: ICD-10-CM

## 2021-09-21 DIAGNOSIS — R73.9 HYPERGLYCEMIA: ICD-10-CM

## 2021-09-21 DIAGNOSIS — N19 RENAL FAILURE, UNSPECIFIED CHRONICITY: ICD-10-CM

## 2021-09-21 PROCEDURE — 99309 SBSQ NF CARE MODERATE MDM 30: CPT | Performed by: NURSE PRACTITIONER

## 2021-09-21 NOTE — PROGRESS NOTES
Smooth Scales  : 1945  Age 77year old  male patient is admitted to   [address] [place] [address] 9/10/21 for rehab and strengthening      Chief complaint:Hypoglycemia ,Renal failure      [facility] admission : 21 to 9/10/21      HPI  58-year-old  male who was b Inhale 2 puffs into the lungs every 6 (six) hours as needed for Wheezing. • Dorzolamide HCl-Timolol Mal 22.3-6.8 MG/ML Ophthalmic Solution Place 1 drop into the left eye 2 (two) times daily.      • apixaban 5 MG Oral Tab Take 5 mg by mouth 2 (two) times moist, pharynx no exudate, no visible cerumen.   NECK: supple; FROM; no JVD, no TMG, no carotid bruits  BREAST: deferred  RESPIRATORY:clear to percussion and auscultation  CARDIOVASCULAR: S1, S2 normal, RRR; no S3, no S4;   ABDOMEN:  normal active BS+, soft CVA  -cont asa 81 mg po daily   -cont apixaban 5 mg po bid      HL  -cont atorvastatin 20mg po q hs     This is a 25 minute visit and greater than 50% of the time was spent counseling the patient and/or coordinating care.     Rachel Lindo, APRN  09/21/21

## 2021-09-22 ENCOUNTER — SNF VISIT (OUTPATIENT)
Dept: INTERNAL MEDICINE CLINIC | Facility: SKILLED NURSING FACILITY | Age: 76
End: 2021-09-22

## 2021-09-22 VITALS
HEART RATE: 80 BPM | TEMPERATURE: 98 F | DIASTOLIC BLOOD PRESSURE: 66 MMHG | RESPIRATION RATE: 20 BRPM | BODY MASS INDEX: 26 KG/M2 | WEIGHT: 173 LBS | SYSTOLIC BLOOD PRESSURE: 135 MMHG | OXYGEN SATURATION: 98 %

## 2021-09-22 DIAGNOSIS — E16.2 HYPOGLYCEMIA: ICD-10-CM

## 2021-09-22 DIAGNOSIS — N19 RENAL FAILURE, UNSPECIFIED CHRONICITY: ICD-10-CM

## 2021-09-22 DIAGNOSIS — R53.1 GENERALIZED WEAKNESS: ICD-10-CM

## 2021-09-22 DIAGNOSIS — G93.40 ENCEPHALOPATHY: ICD-10-CM

## 2021-09-22 PROCEDURE — 99309 SBSQ NF CARE MODERATE MDM 30: CPT | Performed by: NURSE PRACTITIONER

## 2021-09-22 NOTE — PROGRESS NOTES
Lynette Hazel  : 1945  Age 77year old  male patient is admitted to   [address] [place] [address] 9/10/21 for rehab and strengthening      Chief complaint:Hypoglycemia ,Renal failure, discharge plan       [facility] admission : 21 to 9/10/21      HPI  75-year-ol • Albuterol Sulfate  (90 Base) MCG/ACT Inhalation Aero Soln Inhale 2 puffs into the lungs every 6 (six) hours as needed for Wheezing.      • Dorzolamide HCl-Timolol Mal 22.3-6.8 MG/ML Ophthalmic Solution Place 1 drop into the left eye 2 (two) times normocephalic; normal nose, no nasal drainage, mucous membranes pink, moist, pharynx no exudate, no visible cerumen.   NECK: supple; FROM; no JVD, no TMG, no carotid bruits  BREAST: deferred  RESPIRATORY:clear to percussion and auscultation  CARDIOVASCULAR: albuterol puffs 2 q 6 prn   -Pulm and RT consulted in rehab , to eval and follow   -lungs clear  -denies sob   -cpm     Hx.  CVA  -cont asa 81 mg po daily   -cont apixaban 5 mg po bid      HL  -cont atorvastatin 20mg po q hs       This is a 25 minute visit

## 2021-09-23 ENCOUNTER — SNF VISIT (OUTPATIENT)
Dept: INTERNAL MEDICINE CLINIC | Facility: SKILLED NURSING FACILITY | Age: 76
End: 2021-09-23

## 2021-09-23 VITALS
SYSTOLIC BLOOD PRESSURE: 110 MMHG | RESPIRATION RATE: 20 BRPM | BODY MASS INDEX: 26 KG/M2 | OXYGEN SATURATION: 98 % | HEART RATE: 75 BPM | WEIGHT: 173 LBS | TEMPERATURE: 98 F | DIASTOLIC BLOOD PRESSURE: 52 MMHG

## 2021-09-23 DIAGNOSIS — E16.2 HYPOGLYCEMIA: ICD-10-CM

## 2021-09-23 DIAGNOSIS — N19 RENAL FAILURE, UNSPECIFIED CHRONICITY: ICD-10-CM

## 2021-09-23 DIAGNOSIS — G93.40 ENCEPHALOPATHY: ICD-10-CM

## 2021-09-23 DIAGNOSIS — R53.1 GENERALIZED WEAKNESS: ICD-10-CM

## 2021-09-23 DIAGNOSIS — R41.82 ALTERED MENTAL STATUS, UNSPECIFIED ALTERED MENTAL STATUS TYPE: ICD-10-CM

## 2021-09-23 PROCEDURE — 99310 SBSQ NF CARE HIGH MDM 45: CPT | Performed by: NURSE PRACTITIONER

## 2021-09-23 NOTE — PROGRESS NOTES
Chris Perry  : 1945  Age 77year old  male patient is admitted to   [address] [place] [address] 9/10/21 for rehab and strengthening      Chief complaint:Hypoglycemia ,Renal failure, discharge plan       [facility] admission : 21 to 9/10/21      HPI  75-year-ol MEDICATIONS - reviewed and updated     Current Outpatient Medications   Medication Sig Dispense Refill   • mirtazapine 7.5 MG Oral Tab Take 2 tablets (15 mg total) by mouth nightly.  30 tablet 0   • Albuterol Sulfate  (90 Base) MCG/ACT Inhalation Aer distress  LINES, TUBES, DRAINS:  none  SKIN: no rashes, no suspicious lesions  WOUND: none  EYES: PERRLA, EOMI, sclera anicteric, conjunctiva normal; there is no nystagmus, no drainage from eyes  HENT: normocephalic; normal nose, no nasal drainage, mucous rehab     HTN- controlled   -hold bp meds, on losartan/hctz at home  -holding with renal failure  -Blood pressure normotensive without medications   -vs q shift      COPD  -cont trelegy ellipta q day  -cont albuterol puffs 2 q 6 prn   -Pulm and RT consulte

## 2021-09-24 ENCOUNTER — EXTERNAL FACILITY (OUTPATIENT)
Dept: INTERNAL MEDICINE CLINIC | Facility: CLINIC | Age: 76
End: 2021-09-24

## 2021-09-24 DIAGNOSIS — N18.9 ACUTE RENAL FAILURE SUPERIMPOSED ON CHRONIC KIDNEY DISEASE, UNSPECIFIED CKD STAGE, UNSPECIFIED ACUTE RENAL FAILURE TYPE (HCC): ICD-10-CM

## 2021-09-24 DIAGNOSIS — N17.9 ACUTE RENAL FAILURE SUPERIMPOSED ON CHRONIC KIDNEY DISEASE, UNSPECIFIED CKD STAGE, UNSPECIFIED ACUTE RENAL FAILURE TYPE (HCC): ICD-10-CM

## 2021-09-24 DIAGNOSIS — E87.2 METABOLIC ACIDOSIS: ICD-10-CM

## 2021-09-24 DIAGNOSIS — E11.9 TYPE 2 DIABETES MELLITUS WITHOUT COMPLICATION, WITHOUT LONG-TERM CURRENT USE OF INSULIN (HCC): ICD-10-CM

## 2021-09-24 PROCEDURE — 99308 SBSQ NF CARE LOW MDM 20: CPT | Performed by: INTERNAL MEDICINE

## 2021-09-27 NOTE — PROGRESS NOTES
Type in person  Location [facility]  Date of service September 24, 2017     Progressing well. Eating good. No hypoglycemia reported. Labs reviewed.   He is looking forward to going home.     PHYSICAL EXAM:   GENERAL HEALTH: well developed   EYES:conju

## 2021-11-15 ENCOUNTER — HOSPITAL ENCOUNTER (EMERGENCY)
Facility: HOSPITAL | Age: 76
Discharge: HOME | End: 2021-11-15
Attending: EMERGENCY MEDICINE
Payer: MEDICARE

## 2021-11-15 ENCOUNTER — APPOINTMENT (OUTPATIENT)
Dept: GENERAL RADIOLOGY | Facility: HOSPITAL | Age: 76
End: 2021-11-15
Attending: EMERGENCY MEDICINE
Payer: MEDICARE

## 2021-11-15 VITALS
HEIGHT: 65 IN | SYSTOLIC BLOOD PRESSURE: 136 MMHG | HEART RATE: 76 BPM | WEIGHT: 205 LBS | OXYGEN SATURATION: 97 % | BODY MASS INDEX: 34.16 KG/M2 | DIASTOLIC BLOOD PRESSURE: 109 MMHG | RESPIRATION RATE: 18 BRPM | TEMPERATURE: 98 F

## 2021-11-15 DIAGNOSIS — M10.9 GOUTY ARTHRITIS OF LEFT GREAT TOE: Primary | ICD-10-CM

## 2021-11-15 PROCEDURE — 85025 COMPLETE CBC W/AUTO DIFF WBC: CPT | Performed by: EMERGENCY MEDICINE

## 2021-11-15 PROCEDURE — 99284 EMERGENCY DEPT VISIT MOD MDM: CPT

## 2021-11-15 PROCEDURE — 84550 ASSAY OF BLOOD/URIC ACID: CPT | Performed by: EMERGENCY MEDICINE

## 2021-11-15 PROCEDURE — 80048 BASIC METABOLIC PNL TOTAL CA: CPT | Performed by: EMERGENCY MEDICINE

## 2021-11-15 PROCEDURE — 73630 X-RAY EXAM OF FOOT: CPT | Performed by: EMERGENCY MEDICINE

## 2021-11-15 PROCEDURE — 36415 COLL VENOUS BLD VENIPUNCTURE: CPT

## 2021-11-15 RX ORDER — PREDNISONE 20 MG/1
40 TABLET ORAL DAILY
Qty: 10 TABLET | Refills: 0 | Status: SHIPPED | OUTPATIENT
Start: 2021-11-15 | End: 2021-11-20

## 2021-11-15 NOTE — ED PROVIDER NOTES
Patient Seen in: [facility] AND [facility] Emergency Department      History   Patient presents with: Foot Pain    Stated Complaint: foot pain    Subjective:   HPI    Patient is a 61-year-old gentleman with history as listed below.   He complains of onset over t (Temporal)   Resp 18   Ht 165.1 cm (5' 5\")   Wt 93 kg   SpO2 97%   BMI 34.11 kg/m²         Physical Exam    Constitutional: Oriented to person, place, and time. Appears well-developed and well-nourished. HEENT:   Head: Normocephalic and atraumatic.    Ri created for panel order CBC With Differential With Platelet.   Procedure                               Abnormality         Status                     ---------                               -----------         ------                     CBC W/ DIFFERENTIAL[

## 2021-11-15 NOTE — ED INITIAL ASSESSMENT (HPI)
Brought by [facility] EMS for atraumatic left foot pain starting this morning. Unable to ambulate. Chronic DVTs, bilateral foot pain and leg swelling.

## 2022-01-04 ENCOUNTER — APPOINTMENT (OUTPATIENT)
Dept: CT IMAGING | Facility: HOSPITAL | Age: 77
End: 2022-01-04
Attending: EMERGENCY MEDICINE
Payer: MEDICARE

## 2022-01-04 ENCOUNTER — HOSPITAL ENCOUNTER (OUTPATIENT)
Facility: HOSPITAL | Age: 77
Setting detail: OBSERVATION
Discharge: NURSING, REHAB OR LONG-TERM CARE | End: 2022-01-07
Attending: EMERGENCY MEDICINE | Admitting: INTERNAL MEDICINE
Payer: MEDICARE

## 2022-01-04 ENCOUNTER — APPOINTMENT (OUTPATIENT)
Dept: GENERAL RADIOLOGY | Facility: HOSPITAL | Age: 77
End: 2022-01-04
Attending: EMERGENCY MEDICINE
Payer: MEDICARE

## 2022-01-04 DIAGNOSIS — U07.1 COVID-19 VIRUS INFECTION: ICD-10-CM

## 2022-01-04 DIAGNOSIS — E86.0 DEHYDRATION: ICD-10-CM

## 2022-01-04 DIAGNOSIS — N28.9 RENAL INSUFFICIENCY: ICD-10-CM

## 2022-01-04 DIAGNOSIS — R53.1 WEAKNESS GENERALIZED: Primary | ICD-10-CM

## 2022-01-04 DIAGNOSIS — E87.6 HYPOKALEMIA: ICD-10-CM

## 2022-01-04 LAB
ALBUMIN SERPL-MCNC: 3 G/DL (ref 3.4–5)
ALP LIVER SERPL-CCNC: 109 U/L
ALT SERPL-CCNC: 23 U/L
ANION GAP SERPL CALC-SCNC: 10 MMOL/L (ref 0–18)
AST SERPL-CCNC: 29 U/L (ref 15–37)
BASOPHILS # BLD AUTO: 0.01 X10(3) UL (ref 0–0.2)
BASOPHILS NFR BLD AUTO: 0.3 %
BILIRUB DIRECT SERPL-MCNC: 0.3 MG/DL (ref 0–0.2)
BILIRUB SERPL-MCNC: 0.8 MG/DL (ref 0.1–2)
BUN BLD-MCNC: 26 MG/DL (ref 7–18)
BUN/CREAT SERPL: 14.8 (ref 10–20)
CALCIUM BLD-MCNC: 8.4 MG/DL (ref 8.5–10.1)
CHLORIDE SERPL-SCNC: 108 MMOL/L (ref 98–112)
CO2 SERPL-SCNC: 21 MMOL/L (ref 21–32)
CREAT BLD-MCNC: 1.76 MG/DL
DEPRECATED RDW RBC AUTO: 43.8 FL (ref 35.1–46.3)
EOSINOPHIL # BLD AUTO: 0.02 X10(3) UL (ref 0–0.7)
EOSINOPHIL NFR BLD AUTO: 0.6 %
ERYTHROCYTE [DISTWIDTH] IN BLOOD BY AUTOMATED COUNT: 13.5 % (ref 11–15)
FLUAV + FLUBV RNA SPEC NAA+PROBE: NEGATIVE
FLUAV + FLUBV RNA SPEC NAA+PROBE: NEGATIVE
GLUCOSE BLD-MCNC: 151 MG/DL (ref 70–99)
HCT VFR BLD AUTO: 38 %
HGB BLD-MCNC: 13 G/DL
IMM GRANULOCYTES # BLD AUTO: 0.04 X10(3) UL (ref 0–1)
IMM GRANULOCYTES NFR BLD: 1.1 %
LYMPHOCYTES # BLD AUTO: 1.11 X10(3) UL (ref 1–4)
LYMPHOCYTES NFR BLD AUTO: 30.9 %
MCH RBC QN AUTO: 30.6 PG (ref 26–34)
MCHC RBC AUTO-ENTMCNC: 34.2 G/DL (ref 31–37)
MCV RBC AUTO: 89.4 FL
MONOCYTES # BLD AUTO: 0.29 X10(3) UL (ref 0.1–1)
MONOCYTES NFR BLD AUTO: 8.1 %
NEUTROPHILS # BLD AUTO: 2.12 X10 (3) UL (ref 1.5–7.7)
NEUTROPHILS # BLD AUTO: 2.12 X10(3) UL (ref 1.5–7.7)
NEUTROPHILS NFR BLD AUTO: 59 %
OSMOLALITY SERPL CALC.SUM OF ELEC: 296 MOSM/KG (ref 275–295)
PLATELET # BLD AUTO: 151 10(3)UL (ref 150–450)
POTASSIUM SERPL-SCNC: 3.1 MMOL/L (ref 3.5–5.1)
PROT SERPL-MCNC: 6.7 G/DL (ref 6.4–8.2)
RBC # BLD AUTO: 4.25 X10(6)UL
RSV RNA SPEC NAA+PROBE: NEGATIVE
SARS-COV-2 RNA RESP QL NAA+PROBE: DETECTED
SODIUM SERPL-SCNC: 139 MMOL/L (ref 136–145)
WBC # BLD AUTO: 3.6 X10(3) UL (ref 4–11)

## 2022-01-04 PROCEDURE — 85025 COMPLETE CBC W/AUTO DIFF WBC: CPT | Performed by: EMERGENCY MEDICINE

## 2022-01-04 PROCEDURE — 70450 CT HEAD/BRAIN W/O DYE: CPT | Performed by: EMERGENCY MEDICINE

## 2022-01-04 PROCEDURE — 80076 HEPATIC FUNCTION PANEL: CPT | Performed by: EMERGENCY MEDICINE

## 2022-01-04 PROCEDURE — 80048 BASIC METABOLIC PNL TOTAL CA: CPT | Performed by: EMERGENCY MEDICINE

## 2022-01-04 PROCEDURE — 96361 HYDRATE IV INFUSION ADD-ON: CPT

## 2022-01-04 PROCEDURE — 96360 HYDRATION IV INFUSION INIT: CPT

## 2022-01-04 PROCEDURE — 71045 X-RAY EXAM CHEST 1 VIEW: CPT | Performed by: EMERGENCY MEDICINE

## 2022-01-04 PROCEDURE — 0241U SARS-COV-2/FLU A AND B/RSV BY PCR (GENEXPERT): CPT | Performed by: EMERGENCY MEDICINE

## 2022-01-04 PROCEDURE — 99285 EMERGENCY DEPT VISIT HI MDM: CPT

## 2022-01-04 RX ORDER — POTASSIUM CHLORIDE 1.5 G/1.77G
40 POWDER, FOR SOLUTION ORAL ONCE
Status: COMPLETED | OUTPATIENT
Start: 2022-01-04 | End: 2022-01-04

## 2022-01-04 RX ORDER — SODIUM CHLORIDE, SODIUM LACTATE, POTASSIUM CHLORIDE, CALCIUM CHLORIDE 600; 310; 30; 20 MG/100ML; MG/100ML; MG/100ML; MG/100ML
INJECTION, SOLUTION INTRAVENOUS ONCE
Status: COMPLETED | OUTPATIENT
Start: 2022-01-04 | End: 2022-01-04

## 2022-01-04 RX ORDER — ACETAMINOPHEN 325 MG/1
650 TABLET ORAL EVERY 6 HOURS PRN
Status: DISCONTINUED | OUTPATIENT
Start: 2022-01-04 | End: 2022-01-07

## 2022-01-04 RX ORDER — DEXTROSE, SODIUM CHLORIDE, AND POTASSIUM CHLORIDE 5; .45; .15 G/100ML; G/100ML; G/100ML
INJECTION INTRAVENOUS CONTINUOUS
Status: DISCONTINUED | OUTPATIENT
Start: 2022-01-04 | End: 2022-01-06

## 2022-01-04 RX ORDER — ONDANSETRON 2 MG/ML
4 INJECTION INTRAMUSCULAR; INTRAVENOUS EVERY 6 HOURS PRN
Status: DISCONTINUED | OUTPATIENT
Start: 2022-01-04 | End: 2022-01-07

## 2022-01-04 NOTE — ED PROVIDER NOTES
Patient Seen in: [facility] Emergency Department      History   Patient presents with:  Fatigue    Stated Complaint: failure to thrive    Subjective:   HPI    43-year-old male with reported dementia from home brought by EMS after the family called Psychiatric: Normal mood and affect. Behavior is normal.   Nursing note and vitals reviewed. Differential diagnosis includes cognitive decline, viral syndrome, UTI, dehydration.       ED Course     Labs Reviewed   BASIC METABOLIC PANEL (8) - Abnormal; created for panel order CBC With Differential With Platelet.   Procedure                               Abnormality         Status                     ---------                               -----------         ------                     CBC W/ DIFFERENTIAL[

## 2022-01-04 NOTE — H&P
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 DNR/[facility] in past so will continue again this admission     Fluids: D5 0.45 with K at 75cc/hr  Diet: general   DVT prophylaxis: eliquis BID  Code status: DNR/[facility]    [facility]   Answering service [phone]

## 2022-01-04 NOTE — ED NOTES
Orders for admission, patient is aware of plan and ready to go upstairs. Any questions, please call ED RN [address]  at extension [phone].     Type of COVID test sent:rapid   COVID Suspicion level: Low     Titratable drug(s) infusing:LR  Rate:    LOC at time of

## 2022-01-04 NOTE — ED INITIAL ASSESSMENT (HPI)
Pt arrived from home via EMS. Ems called by family. Family states that pt has not been eating, or drinking for the past week.

## 2022-01-05 LAB
ANION GAP SERPL CALC-SCNC: 9 MMOL/L (ref 0–18)
BASOPHILS # BLD AUTO: 0.01 X10(3) UL (ref 0–0.2)
BASOPHILS NFR BLD AUTO: 0.2 %
BUN BLD-MCNC: 25 MG/DL (ref 7–18)
BUN/CREAT SERPL: 17.5 (ref 10–20)
CALCIUM BLD-MCNC: 8.1 MG/DL (ref 8.5–10.1)
CHLORIDE SERPL-SCNC: 112 MMOL/L (ref 98–112)
CO2 SERPL-SCNC: 22 MMOL/L (ref 21–32)
CREAT BLD-MCNC: 1.43 MG/DL
DEPRECATED RDW RBC AUTO: 44.5 FL (ref 35.1–46.3)
EOSINOPHIL # BLD AUTO: 0.02 X10(3) UL (ref 0–0.7)
EOSINOPHIL NFR BLD AUTO: 0.5 %
ERYTHROCYTE [DISTWIDTH] IN BLOOD BY AUTOMATED COUNT: 13.2 % (ref 11–15)
GLUCOSE BLD-MCNC: 182 MG/DL (ref 70–99)
HCT VFR BLD AUTO: 40.1 %
HGB BLD-MCNC: 13.6 G/DL
IMM GRANULOCYTES # BLD AUTO: 0.02 X10(3) UL (ref 0–1)
IMM GRANULOCYTES NFR BLD: 0.5 %
LYMPHOCYTES # BLD AUTO: 0.8 X10(3) UL (ref 1–4)
LYMPHOCYTES NFR BLD AUTO: 19 %
MCH RBC QN AUTO: 31.1 PG (ref 26–34)
MCHC RBC AUTO-ENTMCNC: 33.9 G/DL (ref 31–37)
MCV RBC AUTO: 91.8 FL
MONOCYTES # BLD AUTO: 0.31 X10(3) UL (ref 0.1–1)
MONOCYTES NFR BLD AUTO: 7.4 %
NEUTROPHILS # BLD AUTO: 3.04 X10 (3) UL (ref 1.5–7.7)
NEUTROPHILS # BLD AUTO: 3.04 X10(3) UL (ref 1.5–7.7)
NEUTROPHILS NFR BLD AUTO: 72.4 %
OSMOLALITY SERPL CALC.SUM OF ELEC: 305 MOSM/KG (ref 275–295)
PLATELET # BLD AUTO: 159 10(3)UL (ref 150–450)
POTASSIUM SERPL-SCNC: 3.7 MMOL/L (ref 3.5–5.1)
RBC # BLD AUTO: 4.37 X10(6)UL
SODIUM SERPL-SCNC: 143 MMOL/L (ref 136–145)
WBC # BLD AUTO: 4.2 X10(3) UL (ref 4–11)

## 2022-01-05 PROCEDURE — 97110 THERAPEUTIC EXERCISES: CPT

## 2022-01-05 PROCEDURE — 97530 THERAPEUTIC ACTIVITIES: CPT

## 2022-01-05 PROCEDURE — 92610 EVALUATE SWALLOWING FUNCTION: CPT

## 2022-01-05 PROCEDURE — 92526 ORAL FUNCTION THERAPY: CPT

## 2022-01-05 PROCEDURE — 80048 BASIC METABOLIC PNL TOTAL CA: CPT | Performed by: INTERNAL MEDICINE

## 2022-01-05 PROCEDURE — 96361 HYDRATE IV INFUSION ADD-ON: CPT

## 2022-01-05 PROCEDURE — 97166 OT EVAL MOD COMPLEX 45 MIN: CPT

## 2022-01-05 PROCEDURE — 85025 COMPLETE CBC W/AUTO DIFF WBC: CPT | Performed by: INTERNAL MEDICINE

## 2022-01-05 PROCEDURE — 97161 PT EVAL LOW COMPLEX 20 MIN: CPT

## 2022-01-05 RX ORDER — ASPIRIN 81 MG/1
81 TABLET ORAL DAILY
Status: DISCONTINUED | OUTPATIENT
Start: 2022-01-06 | End: 2022-01-06

## 2022-01-05 RX ORDER — DORZOLAMIDE HYDROCHLORIDE AND TIMOLOL MALEATE 20; 5 MG/ML; MG/ML
1 SOLUTION/ DROPS OPHTHALMIC 2 TIMES DAILY
Status: DISCONTINUED | OUTPATIENT
Start: 2022-01-05 | End: 2022-01-07

## 2022-01-05 RX ORDER — ALBUTEROL SULFATE 90 UG/1
2 AEROSOL, METERED RESPIRATORY (INHALATION) EVERY 6 HOURS PRN
Status: DISCONTINUED | OUTPATIENT
Start: 2022-01-05 | End: 2022-01-07

## 2022-01-05 RX ORDER — GUAIFENESIN 100 MG/5ML
100 SOLUTION ORAL EVERY 4 HOURS PRN
Status: DISCONTINUED | OUTPATIENT
Start: 2022-01-05 | End: 2022-01-07

## 2022-01-05 RX ORDER — ATORVASTATIN CALCIUM 20 MG/1
20 TABLET, FILM COATED ORAL NIGHTLY
Status: DISCONTINUED | OUTPATIENT
Start: 2022-01-05 | End: 2022-01-07

## 2022-01-05 NOTE — SPIRITUAL CARE NOTE
Chaplain Christopher conferred with RN Cleopatra Crocker and visited pt. Observed pt lying in bed watching tv. Introduced self offering spiritual care and support. No needs at present. Pt verbalized that he is sick. Pt experiencing covid symptoms. Pt seems short winded.  Pro

## 2022-01-05 NOTE — SLP NOTE
ADULT CLINICAL SWALLOWING EVALUATION    ASSESSMENT    ASSESSMENT/OVERALL IMPRESSION:  PPE REQUIRED. THIS THERAPIST WORE an N95 mask, gown, GLOVES, DROPLET MASK, AND GOGGLES FOR DURATION OF EVALUATION. HANDS WASHED UPON ENTRANCE/EXIT.       77year old male edentulous status and difficulty with bolus formation and preparation. Generalized weakness and incoordination with residual L side orofacial weakness from prior stroke may be exacerbated at this time 2/2 lack of ability to compensate for deficits.       R PVD (peripheral vascular disease) ([facility] [address]     pvd   • Stroke [facility])        Prior Living Situation: Home with spouse  Diet Prior to Admission: Soft/ Easy to [place]; Thin liquids  Precautions: Aspiration; Reflux    Patient/Family Goals: \"I like that water\"    DILLAN clinically.  Videofluoroscopic Swallow Study is required to rule-out silent aspiration.)    Esophageal Phase of Swallow: No complaints consistent with possible esophageal involvement            GOALS  Goal #1 The patient will tolerate Minced and Moist consi

## 2022-01-05 NOTE — HOME CARE LIAISON
This patient is current with [facility]. Patient will need a PIERRE before DC for RN/PT/OT.

## 2022-01-05 NOTE — PLAN OF CARE
No acute changes during shift. Pt refused breakfast, dietary on consult and MD aware. Safety precautions in place and call light within reach. Continue to monitor with frequent nursing rounds.        Problem: Patient Centered Care  Goal: Patient preferences cognitive and physical deficits and behaviors that affect risk of falls.   - [place] fall precautions as indicated by assessment.  - Educate pt/family on patient safety including physical limitations  - Instruct pt to call for assistance with activity bas Encourage use of breath work, imagery, meditation, relaxation, reiki to ease distress and provide healing  - Encourage use of cultural and spiritual celebrations and rituals  - Facilitate discussion that helps patient sort out spiritual concerns  - Help pa

## 2022-01-05 NOTE — PLAN OF CARE
Patient is alert and oriented x2. Patient vital signs stable and no acute changes during shift. Patient denies any pain or discomfort. Patient on room air and no complaints of SOB. Call light always within reach and safety precautions in place.       Proble Monitor WBC  - Administer growth factors as ordered  - Implement neutropenic guidelines  1/5/2022 0736 by Norma Turner RN  Outcome: Progressing  1/5/2022 0642 by Norma Turner RN  Outcome: Progressing     Problem: SAFETY ADULT - FALL  Goal: Free from fall on fluid and nutrition restrictions as appropriate  1/5/2022 0736 by Bruce Kim RN  Outcome: Progressing  1/5/2022 0642 by Bruce Kim RN  Outcome: Progressing     Problem: SKIN/TISSUE INTEGRITY - ADULT  Goal: Skin integrity remains intact  Descriptio

## 2022-01-05 NOTE — ED QUICK NOTES
Orders for admission, patient is aware of plan and ready to go upstairs. Any questions, please call ED RN Maira Greco  at [address] [place] [address].    Type of COVID test sent:  COVID Suspicion level:   High  positive     Titratable drug(s) infusing:  Rate:    LOC at time o

## 2022-01-05 NOTE — PHYSICAL THERAPY NOTE
PHYSICAL THERAPY EVALUATION - INPATIENT     Room Number: 570/570-A  Evaluation Date: 1/5/2022  Type of Evaluation: Initial   Physician Order: PT Eval and Treat    Presenting Problem: Weakness, covid   Co-Morbidities : CVA L sided weakness   Reason for The HISTORY     History related to current admission: Covid with weakness     Problem List  Principal Problem:    Weakness generalized  Active Problems:    COVID-19 virus infection    Dehydration    Renal insufficiency    Hypokalemia      Past Medical History Dependent    ACTIVITY TOLERANCE  Pulse: 88  Heart Rate Source: Monitor  Resp: 20  BP: 103/78  BP Location: Left arm  BP Method: Automatic  Patient Position: Lying    O2 WALK       AM-PAC '6-Clicks' INPATIENT SHORT FORM - BASIC MOBILITY  How much difficulty Patient is able to demonstrate transfers Sit to/from Stand at assistance level: moderate assistance with walker - rolling     Goal #2  Current Status Total assist l sided weakness past CVA   Goal #3 Patient is able to ambulate 10 feet with assist device: w

## 2022-01-05 NOTE — OCCUPATIONAL THERAPY NOTE
OCCUPATIONAL THERAPY EVALUATION - INPATIENT     Room Number: 570/570-A  Evaluation Date: 1/5/2022  Type of Evaluation: Initial     Presenting Problem: generalized weakness, COVID-19  Physician Order: IP Consult to Occupational Therapy  Reason for Therapy: in the [facility] '6 clicks' Inpatient Daily Activity Short Form. Research supports that patients with this level of impairment may benefit from JUAN JOSÉ. At baseline, patient reports he receives assist for all ADLs and is MI for fx mobility/transfers using RW.  [employer] memory deficits d/t dementia. Reports he receives assist for all ADLs and is MI for fx mobility/transfers using RW at baseline. SUBJECTIVE  \"My daughter helps me. \"    OCCUPATIONAL THERAPY EXAMINATION      OBJECTIVE  Precautions: Bed/chair alarm (virgilio a  Upper Extremity Dressing: mod a  Lower Extremity Dressing: max a    Education Provided: OT role in acute care, plan of care, bed mobility, activity promotion, sitting balance  Patient End of Session: In bed;Needs met;Call light within reach;RN aware of

## 2022-01-05 NOTE — PROGRESS NOTES
[facility] Hospitalist Progress Note     CC: Hospital Follow up    PCP: KIAN Devries       Assessment/Plan:   77year old male with history of dementia, COPD, CKD stage 4, GERD, Gout, hyperlipidemia, history of stroke, history of VTE (DVT), who Net 105.55 ml       Last 3 Weights  01/04/22 1555 : 180 lb (81.6 kg)  11/15/21 1051 : 205 lb (93 kg)  09/23/21 1134 : 173 lb (78.5 kg)      /78 (BP Location: Left arm)   Pulse 88   Temp 99.1 °F (37.3 °C) (Oral)   Resp 20   Ht 5' 9\" (1.753 m)   Wt MD on 1/04/2022 at 6:28 PM              Meds:     • apixaban  5 mg Oral BID   • [START ON 1/6/2022] aspirin  81 mg Oral Daily   • atorvastatin  20 mg Oral Nightly   • dorzolamide-timolol  1 drop Left Eye BID   • fluticasone-umeclidin-vilant  1 puff Inhalat

## 2022-01-05 NOTE — CM/SW NOTE
01/05/22 1500   CM/SW Referral Data   Reason for Referral Discharge planning   Informant EMR   Pertinent Medical Hx   Does patient have an established PCP?  Yes   Significant Past Medical/Mental Health Hx cva, dementia   Patient Info   Advanced directive

## 2022-01-06 PROCEDURE — 96361 HYDRATE IV INFUSION ADD-ON: CPT

## 2022-01-06 RX ORDER — ASPIRIN 81 MG/1
81 TABLET, CHEWABLE ORAL DAILY
Status: DISCONTINUED | OUTPATIENT
Start: 2022-01-07 | End: 2022-01-07

## 2022-01-06 RX ORDER — CLOTRIMAZOLE 10 MG/1
1 LOZENGE ORAL; TOPICAL
Status: DISCONTINUED | OUTPATIENT
Start: 2022-01-06 | End: 2022-01-06

## 2022-01-06 NOTE — PROGRESS NOTES
[facility] Hospitalist Progress Note     CC: Hospital Follow up    PCP: KIAN Mccray Medal       Assessment/Plan:   77year old male with history of dementia, COPD, CKD stage 4, GERD, Gout, hyperlipidemia, history of stroke, history of VTE (DVT), who 675 ml   Net 825 ml       Last 3 Weights  01/04/22 1555 : 180 lb (81.6 kg)  11/15/21 1051 : 205 lb (93 kg)  09/23/21 1134 : 173 lb (78.5 kg)      BP 99/73 (BP Location: Left arm)   Pulse 85   Temp 97.3 °F (36.3 °C) (Oral)   Resp 18   Ht 5' 9\" (1.753 m) MD AURELIA on 1/04/2022 at 6:28 PM              Meds:     • clotrimazole  1 lozenge Oral Q4H While awake   • apixaban  5 mg Oral BID   • aspirin  81 mg Oral Daily   • atorvastatin  20 mg Oral Nightly   • dorzolamide-timolol  1 drop Left Eye BID   • fluticasone-

## 2022-01-06 NOTE — PLAN OF CARE
No acute changes during shift. Safety precautions in place and call light within reach. Continue to monitor with frequent nursing rounds.     Problem: Patient Centered Care  Goal: Patient preferences are identified and integrated in the patient's plan of ca risk of falls.   - [place] fall precautions as indicated by assessment.  - Educate pt/family on patient safety including physical limitations  - Instruct pt to call for assistance with activity based on assessment  - Modify environment to reduce risk of i distractions in the room when full attention is required  Outcome: Progressing     Problem: SPIRITUAL CARE  Goal: Pt feels balance and connection with higher power that empowers the self during times of loss, guilt and fear  Description: INTERVENTIONS:  -

## 2022-01-06 NOTE — DIETARY NOTE
ADULT NUTRITION INITIAL ASSESSMENT    Pt is at high nutrition risk. Pt meets severe malnutrition criteria.       CRITERIA FOR MALNUTRITION DIAGNOSIS:  Criteria for severe malnutrition diagnosis: acute illness/injury related to wt loss greater than 2% in 1 September however noted pt loosing wt again with minimal PO intake over the past ~1 wk. Pt taking only soups and water. Wife confirmed to this RD that the pt does not want aggressive nutrition support via TF.  RN reports pt consumes ~40% of tray this AM wit documentation    ANTHROPOMETRICS:  HT: 175.3 cm (5' 9\")  WT: 81.6 kg (180 lb)   BMI: Body mass index is 26.58 kg/m².   BMI CLASSIFICATION: 23-26.9 kg/m2 - WNL for advanced age  IBW: 160 lbs        113% IBW  Usual Body Wt: 187-190 lbs       95-96% UBW    WE Coordination of nutrition care: request current weight and collaboration with other providers - recommendations/interventions communicated to RN via secure message and on unit   - Discharge and transfer of nutrition care to new setting or provider: monitor

## 2022-01-06 NOTE — COVID NURSING ASSESSMENT
COVID-19 Daily Discharge Readiness-Nursing    O2 Sat at Rest:  SPO2% on Room Air at Rest: 95  %   O2 Sat with Exertion: Pt currently rolling side to side  Temperature max from last 24 hrs: Temp (24hrs), Av.7 °F (36.5 °C), Min:97.3 °F (36.3 °C), Max:97.

## 2022-01-06 NOTE — PALLIATIVE CARE NOTE
[facility] with [facility].  Care to resume after dc from hospital.     Marga Veliz  Palliative Liaison  [id number]

## 2022-01-06 NOTE — CM/SW NOTE
ALISHA following for discharge planning. PT/OT recommending JUAN JOSÉ. ALISHA contacted patient's wife, Hallie Ann. Hallie Ann confirmed patient lives at home with her and requires assistance with ADLs/IADLs.  Hallie Ann confirmed current services with [facility] and [facility]

## 2022-01-07 VITALS
HEART RATE: 79 BPM | WEIGHT: 180 LBS | DIASTOLIC BLOOD PRESSURE: 73 MMHG | HEIGHT: 69 IN | BODY MASS INDEX: 26.66 KG/M2 | SYSTOLIC BLOOD PRESSURE: 147 MMHG | RESPIRATION RATE: 18 BRPM | OXYGEN SATURATION: 93 % | TEMPERATURE: 97 F

## 2022-01-07 PROCEDURE — 97530 THERAPEUTIC ACTIVITIES: CPT

## 2022-01-07 PROCEDURE — 97535 SELF CARE MNGMENT TRAINING: CPT

## 2022-01-07 NOTE — PLAN OF CARE
Patient with possible transfer to subacute rehab facility. Patient awake, no c/o pain, appetite not very good, on room air, incontinent of bowel/bladder.   Problem: Patient Centered Care  Goal: Patient preferences are identified and integrated in the patie physical needs  - Identify cognitive and physical deficits and behaviors that affect risk of falls.   - [place] fall precautions as indicated by assessment.  - Educate pt/family on patient safety including physical limitations  - Instruct pt to call for a attention, thought processing and/or memory  Description: Interventions:    Outcome: Progressing     Problem: SPIRITUAL CARE  Goal: Pt feels balance and connection with higher power that empowers the self during times of loss, guilt and fear  Description:

## 2022-01-07 NOTE — DISCHARGE SUMMARY
General Medicine Discharge Summary     Patient ID:  Hadley Robison  77year old  9/13/1945    Admit date: 1/4/2022    Discharge date and time: 1/7/22    Attending Physician: DO Sb Godfrey Car Aero Soln  Inhale 2 puffs into the lungs every 6 (six) hours as needed for Wheezing. Dorzolamide HCl-Timolol Mal 22.3-6.8 MG/ML Ophthalmic Solution  Place 1 drop into the left eye 2 (two) times daily.     apixaban 5 MG Oral Tab  Take 5 mg by mouth 2 (two

## 2022-01-07 NOTE — OCCUPATIONAL THERAPY NOTE
OCCUPATIONAL THERAPY TREATMENT NOTE - INPATIENT        Room Number: 570/570-A                Problem List  Principal Problem:    Weakness generalized  Active Problems:    COVID-19 virus infection    Dehydration    Renal insufficiency    Hypokalemia      OC throat. nursing aware)  Management Techniques:  Activity promotion     ACTIVITY TOLERANCE  Pulse: 93                     O2 SATURATIONS  Oxygen Therapy  SPO2% on Room Air at Rest: 93  SPO2% Ambulation on Room Air: 92    ACTIVITIES OF DAILY LIVING ASSESSMENT goal is: none stated      Patient will complete functional transfer with min a  Comment: min asisst, with mod encouragement, decreased standing balance noted, RW for support, cues needed for safety     Patient will complete toileting with min a  Comment: N

## 2022-01-07 NOTE — PROGRESS NOTES
[facility] Hospitalist Progress Note     CC: Hospital Follow up    PCP: KIAN Salinas       Assessment/Plan:   77year old male with history of dementia, COPD, CKD stage 4, GERD, Gout, hyperlipidemia, history of stroke, history of VTE (DVT), who hours) at 1/7/2022 1039  Last data filed at 1/7/2022 0820  Gross per 24 hour   Intake 280 ml   Output 650 ml   Net -370 ml       Last 3 Weights  01/04/22 1555 : 180 lb (81.6 kg)  11/15/21 1051 : 205 lb (93 kg)  09/23/21 1134 : 173 lb (78.5 kg)      / Dictated by (CST): Guanako Mustafa MD on 1/04/2022 at 6:27 PM     Finalized by (CST): Guanako Mustafa MD on 1/04/2022 at 6:28 PM              Meds:     • nystatin  5 mL Oral QID   • aspirin  81 mg Oral Daily   • apixaban  5 mg Oral BID   • atorvast

## 2022-01-08 NOTE — PLAN OF CARE
No acute changes during shift. MD orders in for DC. IV removed by Erum Wray RN. AVS reviewed with spouse, understood and acknowledged all changes in medication and follow-up visits. No belongings to be returned.  Pt transported to The [address] [place] [address] via amb growth factors as ordered  - Implement neutropenic guidelines  Outcome: Adequate for Discharge     Problem: SAFETY ADULT - FALL  Goal: Free from fall injury  Description: INTERVENTIONS:  - Assess pt frequently for physical needs  - Identify cognitive and p and document skin integrity  - Monitor for areas of redness and/or skin breakdown  - Initiate interventions, skin care algorithm/standards of care as needed  Outcome: Adequate for Discharge     Problem: Impaired Cognition  Goal: Patient will exhibit improv

## 2022-07-02 NOTE — PLAN OF CARE
Problem: Patient Centered Care  Goal: Patient preferences are identified and integrated in the patient's plan of care  Description: Interventions:  - What would you like us to know as we care for you? I live at home with my wife. - Provide timely, complete, and accurate information to patient/family  - Incorporate patient and family knowledge, values, beliefs, and cultural backgrounds into the planning and delivery of care  - Encourage patient/family to participate in care and decision-making at the level they choose  - Honor patient and family perspectives and choices  Outcome: Progressing     Problem: Patient/Family Goals  Goal: Patient/Family Long Term Goal  Description: Patient's Long Term Goal: Be discharged from hospital.    Interventions:  - Follow MD recommendations  - See additional Care Plan goals for specific interventions  Outcome: Progressing  Goal: Patient/Family Short Term Goal  Description: Patient's Short Term Goal: Resolve hypoglycemia.     Interventions:   - Accuchecks AC&HS  - IV fluids as orderd  - See additional Care Plan goals for specific interventions  Outcome: Progressing     Problem: METABOLIC/FLUID AND ELECTROLYTES - ADULT  Goal: Glucose maintained within prescribed range  Description: INTERVENTIONS:  - Monitor Blood Glucose as ordered  - Assess for signs and symptoms of hyperglycemia and hypoglycemia  - Administer ordered medications to maintain glucose within target range  - Assess barriers to adequate nutritional intake and initiate nutrition consult as needed  - Instruct patient on self management of diabetes  Outcome: Progressing  Goal: Electrolytes maintained within normal limits  Description: INTERVENTIONS:  - Monitor labs and rhythm and assess patient for signs and symptoms of electrolyte imbalances  - Administer electrolyte replacement as ordered  - Monitor response to electrolyte replacements, including rhythm and repeat lab results as appropriate  - Fluid restriction as ordered  - Instruct patient on fluid and nutrition restrictions as appropriate  Outcome: Progressing  Goal: Hemodynamic stability and optimal renal function maintained  Description: INTERVENTIONS:  - Monitor labs and assess for signs and symptoms of volume excess or deficit  - Monitor intake, output and patient weight  - Monitor urine specific gravity, serum osmolarity and serum sodium as indicated or ordered  - Monitor response to interventions for patient's volume status, including labs, urine output, blood pressure (other measures as available)  - Encourage oral intake as appropriate  - Instruct patient on fluid and nutrition restrictions as appropriate  Outcome: Progressing     Problem: SAFETY ADULT - FALL  Goal: Free from fall injury  Description: INTERVENTIONS:  - Assess pt frequently for physical needs  - Identify cognitive and physical deficits and behaviors that affect risk of falls. - [place] fall precautions as indicated by assessment.  - Educate pt/family on patient safety including physical limitations  - Instruct pt to call for assistance with activity based on assessment  - Modify environment to reduce risk of injury  - Provide assistive devices as appropriate  - Consider OT/PT consult to assist with strengthening/mobility  - Encourage toileting schedule  Outcome: Progressing     No c/o pain. Call light within reach, bed in lowest position, alarms on, and nonskid socks on. HASA

## 2022-09-11 NOTE — PROGRESS NOTES
[facility]    Cardiology - [facility]  Progress Note    Raúljamilah Hazel Patient Status:  Inpatient    1945 MRN [id number]   Location [facility] 2W/SW Attending Anaya Nuñez MD   [facility]       Assessment and 04/08/2017   K 3.8 04/09/2017    04/08/2017   CO2 19* 04/08/2017   * 04/08/2017   CA 8.3* 04/08/2017   ALB 2.7* 04/08/2017   ALKPHO 65 04/08/2017   BILT 1.3* 04/08/2017   TP 5.8* 04/08/2017   AST 15 04/08/2017   ALT 12* 04/08/2017   PTT 34.0 0 Statement Selected

## 2022-10-17 ENCOUNTER — HOSPITAL ENCOUNTER (INPATIENT)
Facility: HOSPITAL | Age: 77
LOS: 2 days | Discharge: HOME HEALTH | End: 2022-10-19
Attending: EMERGENCY MEDICINE | Admitting: INTERNAL MEDICINE
Payer: MEDICARE

## 2022-10-17 ENCOUNTER — APPOINTMENT (OUTPATIENT)
Dept: CT IMAGING | Facility: HOSPITAL | Age: 77
End: 2022-10-17
Attending: EMERGENCY MEDICINE
Payer: MEDICARE

## 2022-10-17 DIAGNOSIS — R53.1 WEAKNESS GENERALIZED: Primary | ICD-10-CM

## 2022-10-17 DIAGNOSIS — E86.0 DEHYDRATION: ICD-10-CM

## 2022-10-17 LAB
ANION GAP SERPL CALC-SCNC: 7 MMOL/L (ref 0–18)
BASOPHILS # BLD AUTO: 0.07 X10(3) UL (ref 0–0.2)
BASOPHILS NFR BLD AUTO: 0.9 %
BILIRUB UR QL: NEGATIVE
BUN BLD-MCNC: 28 MG/DL (ref 7–18)
BUN/CREAT SERPL: 9 (ref 10–20)
CALCIUM BLD-MCNC: 8.8 MG/DL (ref 8.5–10.1)
CHLORIDE SERPL-SCNC: 112 MMOL/L (ref 98–112)
CLARITY UR: CLEAR
CO2 SERPL-SCNC: 22 MMOL/L (ref 21–32)
COLOR UR: YELLOW
CREAT BLD-MCNC: 3.12 MG/DL
DEPRECATED RDW RBC AUTO: 43.1 FL (ref 35.1–46.3)
EOSINOPHIL # BLD AUTO: 0.27 X10(3) UL (ref 0–0.7)
EOSINOPHIL NFR BLD AUTO: 3.7 %
ERYTHROCYTE [DISTWIDTH] IN BLOOD BY AUTOMATED COUNT: 12.8 % (ref 11–15)
EST. AVERAGE GLUCOSE BLD GHB EST-MCNC: 197 MG/DL (ref 68–126)
GFR SERPLBLD BASED ON 1.73 SQ M-ARVRAT: 20 ML/MIN/1.73M2 (ref 60–?)
GLUCOSE BLD-MCNC: 228 MG/DL (ref 70–99)
GLUCOSE BLDC GLUCOMTR-MCNC: 115 MG/DL (ref 70–99)
GLUCOSE BLDC GLUCOMTR-MCNC: 150 MG/DL (ref 70–99)
GLUCOSE BLDC GLUCOMTR-MCNC: 192 MG/DL (ref 70–99)
GLUCOSE BLDC GLUCOMTR-MCNC: 221 MG/DL (ref 70–99)
GLUCOSE UR-MCNC: >=1000 MG/DL
HBA1C MFR BLD: 8.5 % (ref ?–5.7)
HCT VFR BLD AUTO: 41.1 %
HGB BLD-MCNC: 14.3 G/DL
IMM GRANULOCYTES # BLD AUTO: 0.03 X10(3) UL (ref 0–1)
IMM GRANULOCYTES NFR BLD: 0.4 %
KETONES UR-MCNC: NEGATIVE MG/DL
LYMPHOCYTES # BLD AUTO: 2.92 X10(3) UL (ref 1–4)
LYMPHOCYTES NFR BLD AUTO: 39.6 %
MCH RBC QN AUTO: 31.9 PG (ref 26–34)
MCHC RBC AUTO-ENTMCNC: 34.8 G/DL (ref 31–37)
MCV RBC AUTO: 91.7 FL
MONOCYTES # BLD AUTO: 0.56 X10(3) UL (ref 0.1–1)
MONOCYTES NFR BLD AUTO: 7.6 %
NEUTROPHILS # BLD AUTO: 3.52 X10 (3) UL (ref 1.5–7.7)
NEUTROPHILS # BLD AUTO: 3.52 X10(3) UL (ref 1.5–7.7)
NEUTROPHILS NFR BLD AUTO: 47.8 %
NITRITE UR QL STRIP.AUTO: NEGATIVE
OSMOLALITY SERPL CALC.SUM OF ELEC: 305 MOSM/KG (ref 275–295)
PH UR: 6 [PH] (ref 5–8)
PLATELET # BLD AUTO: 166 10(3)UL (ref 150–450)
POTASSIUM SERPL-SCNC: 4.2 MMOL/L (ref 3.5–5.1)
RBC # BLD AUTO: 4.48 X10(6)UL
SARS-COV-2 RNA RESP QL NAA+PROBE: NOT DETECTED
SODIUM SERPL-SCNC: 141 MMOL/L (ref 136–145)
SP GR UR STRIP: 1.01 (ref 1–1.03)
UROBILINOGEN UR STRIP-ACNC: 0.2
WBC # BLD AUTO: 7.4 X10(3) UL (ref 4–11)

## 2022-10-17 PROCEDURE — 87086 URINE CULTURE/COLONY COUNT: CPT | Performed by: EMERGENCY MEDICINE

## 2022-10-17 PROCEDURE — 93010 ELECTROCARDIOGRAM REPORT: CPT | Performed by: EMERGENCY MEDICINE

## 2022-10-17 PROCEDURE — 85025 COMPLETE CBC W/AUTO DIFF WBC: CPT | Performed by: EMERGENCY MEDICINE

## 2022-10-17 PROCEDURE — 93005 ELECTROCARDIOGRAM TRACING: CPT

## 2022-10-17 PROCEDURE — 70450 CT HEAD/BRAIN W/O DYE: CPT | Performed by: EMERGENCY MEDICINE

## 2022-10-17 PROCEDURE — 81001 URINALYSIS AUTO W/SCOPE: CPT | Performed by: EMERGENCY MEDICINE

## 2022-10-17 PROCEDURE — 82962 GLUCOSE BLOOD TEST: CPT

## 2022-10-17 PROCEDURE — 83036 HEMOGLOBIN GLYCOSYLATED A1C: CPT | Performed by: INTERNAL MEDICINE

## 2022-10-17 PROCEDURE — 81015 MICROSCOPIC EXAM OF URINE: CPT | Performed by: EMERGENCY MEDICINE

## 2022-10-17 PROCEDURE — 80048 BASIC METABOLIC PNL TOTAL CA: CPT | Performed by: EMERGENCY MEDICINE

## 2022-10-17 PROCEDURE — 36415 COLL VENOUS BLD VENIPUNCTURE: CPT

## 2022-10-17 PROCEDURE — 99285 EMERGENCY DEPT VISIT HI MDM: CPT

## 2022-10-17 RX ORDER — COLCHICINE 0.6 MG/1
0.6 TABLET ORAL DAILY
COMMUNITY

## 2022-10-17 RX ORDER — ATORVASTATIN CALCIUM 20 MG/1
20 TABLET, FILM COATED ORAL NIGHTLY
Status: DISCONTINUED | OUTPATIENT
Start: 2022-10-17 | End: 2022-10-20

## 2022-10-17 RX ORDER — DORZOLAMIDE HYDROCHLORIDE AND TIMOLOL MALEATE 20; 5 MG/ML; MG/ML
1 SOLUTION/ DROPS OPHTHALMIC 2 TIMES DAILY
Status: DISCONTINUED | OUTPATIENT
Start: 2022-10-17 | End: 2022-10-20

## 2022-10-17 RX ORDER — TAMSULOSIN HYDROCHLORIDE 0.4 MG/1
0.4 CAPSULE ORAL DAILY
Status: DISCONTINUED | OUTPATIENT
Start: 2022-10-18 | End: 2022-10-20

## 2022-10-17 RX ORDER — NICOTINE POLACRILEX 4 MG
30 LOZENGE BUCCAL
Status: DISCONTINUED | OUTPATIENT
Start: 2022-10-17 | End: 2022-10-20

## 2022-10-17 RX ORDER — HYDRALAZINE HYDROCHLORIDE 20 MG/ML
10 INJECTION INTRAMUSCULAR; INTRAVENOUS EVERY 6 HOURS PRN
Status: DISCONTINUED | OUTPATIENT
Start: 2022-10-17 | End: 2022-10-20

## 2022-10-17 RX ORDER — NICOTINE POLACRILEX 4 MG
15 LOZENGE BUCCAL
Status: DISCONTINUED | OUTPATIENT
Start: 2022-10-17 | End: 2022-10-20

## 2022-10-17 RX ORDER — COLCHICINE 0.6 MG/1
0.6 TABLET ORAL DAILY
Status: DISCONTINUED | OUTPATIENT
Start: 2022-10-18 | End: 2022-10-20

## 2022-10-17 RX ORDER — ALBUTEROL SULFATE 90 UG/1
2 AEROSOL, METERED RESPIRATORY (INHALATION) EVERY 6 HOURS PRN
Status: DISCONTINUED | OUTPATIENT
Start: 2022-10-17 | End: 2022-10-20

## 2022-10-17 RX ORDER — LOSARTAN POTASSIUM 100 MG/1
TABLET ORAL DAILY
COMMUNITY

## 2022-10-17 RX ORDER — SODIUM CHLORIDE 9 MG/ML
1000 INJECTION, SOLUTION INTRAVENOUS ONCE
Status: COMPLETED | OUTPATIENT
Start: 2022-10-17 | End: 2022-10-17

## 2022-10-17 RX ORDER — ASPIRIN 81 MG/1
81 TABLET ORAL DAILY
Status: DISCONTINUED | OUTPATIENT
Start: 2022-10-18 | End: 2022-10-20

## 2022-10-17 RX ORDER — LOSARTAN POTASSIUM 100 MG/1
100 TABLET ORAL DAILY
Status: DISCONTINUED | OUTPATIENT
Start: 2022-10-18 | End: 2022-10-20

## 2022-10-17 RX ORDER — DEXTROSE MONOHYDRATE 25 G/50ML
50 INJECTION, SOLUTION INTRAVENOUS
Status: DISCONTINUED | OUTPATIENT
Start: 2022-10-17 | End: 2022-10-20

## 2022-10-17 NOTE — PLAN OF CARE
Pt arrived to floor alert, only oriented to self. Admission navigator completed with wife, Primitivo Goldman. BP high upon arrival, orders obtained for hydralazine prn. Pt educated on safety & call light use, call precautions in place. Frequent rounding by nursing staff. Problem: Patient Centered Care  Goal: Patient preferences are identified and integrated in the patient's plan of care  Description: Interventions:  - What would you like us to know as we care for you?  I live at home with my wife and family  - Provide timely, complete, and accurate information to patient/family  - Incorporate patient and family knowledge, values, beliefs, and cultural backgrounds into the planning and delivery of care  - Encourage patient/family to participate in care and decision-making at the level they choose  - Honor patient and family perspectives and choices  Outcome: Progressing     Problem: Diabetes/Glucose Control  Goal: Glucose maintained within prescribed range  Description: INTERVENTIONS:  - Monitor Blood Glucose as ordered  - Assess for signs and symptoms of hyperglycemia and hypoglycemia  - Administer ordered medications to maintain glucose within target range  - Assess barriers to adequate nutritional intake and initiate nutrition consult as needed  - Instruct patient on self management of diabetes  Outcome: Progressing     Problem: Patient/Family Goals  Goal: Patient/Family Long Term Goal  Description: Patient's Long Term Goal: to go home    Interventions:  - Monitor vital signs  - Monitor appropriate labs  - Monitor blood glucose levels  - Administer medications per order  - Pain management as needed   - Follow MD orders  - Diagnostics per order  - Assist with ADLs as needed  - Update / inform patient and family on plan of care  - Discharge planning    - See additional Care Plan goals for specific interventions  Outcome: Progressing  Goal: Patient/Family Short Term Goal  Description: Patient's Short Term Goal: to get stronger    Interventions:   - Monitor vital signs  - Monitor appropriate labs  - Monitor blood glucose levels  - Administer medications per order  - Pain management as needed   - Follow MD orders  - Diagnostics per order  - Assist with ADLs as needed  - Update / inform patient and family on plan of care  - Discharge planning    - See additional Care Plan goals for specific interventions  Outcome: Progressing     Problem: METABOLIC/FLUID AND ELECTROLYTES - ADULT  Goal: Glucose maintained within prescribed range  Description: INTERVENTIONS:  - Monitor Blood Glucose as ordered  - Assess for signs and symptoms of hyperglycemia and hypoglycemia  - Administer ordered medications to maintain glucose within target range  - Assess barriers to adequate nutritional intake and initiate nutrition consult as needed  - Instruct patient on self management of diabetes  Outcome: Progressing  Goal: Electrolytes maintained within normal limits  Description: INTERVENTIONS:  - Monitor labs and rhythm and assess patient for signs and symptoms of electrolyte imbalances  - Administer electrolyte replacement as ordered  - Monitor response to electrolyte replacements, including rhythm and repeat lab results as appropriate  - Fluid restriction as ordered  - Instruct patient on fluid and nutrition restrictions as appropriate  Outcome: Progressing     Problem: MUSCULOSKELETAL - ADULT  Goal: Return mobility to safest level of function  Description: INTERVENTIONS:  - Assess patient stability and activity tolerance for standing, transferring and ambulating w/ or w/o assistive devices  - Assist with transfers and ambulation using safe patient handling equipment as needed  - Ensure adequate protection for wounds/incisions during mobilization  - Obtain PT/OT consults as needed  - Advance activity as appropriate  - Communicate ordered activity level and limitations with patient/family  Outcome: Progressing     Problem: Impaired Functional Mobility  Goal: Achieve highest/safest level of mobility/gait  Description: Interventions:  - Assess patient's functional ability and stability  - Promote increasing activity/tolerance for mobility and gait  - Educate and engage patient/family in tolerated activity level and precautions  - Recommend use of  RW for transfers and ambulation  Outcome: Progressing

## 2022-10-17 NOTE — ED QUICK NOTES
Orders for admission, patient is aware of plan and ready to go upstairs. Any questions, please call ED RN Lorena Gao at extension [phone].      Patient Covid vaccination status: Partially vaccinated     COVID Test Ordered in ED: Rapid SARS-CoV-2 by PCR    COVID Suspicion at Admission: N/A    Running Infusions:  None    Mental Status/LOC at time of transport: A&Ox2    Other pertinent information:   CIWA score: N/A   NIH score:  N/A

## 2022-10-17 NOTE — ED INITIAL ASSESSMENT (HPI)
Pt coming from home for alt mental status according to family, family states he was just looking out the window not talking to them.

## 2022-10-18 LAB
ANION GAP SERPL CALC-SCNC: 11 MMOL/L (ref 0–18)
BUN BLD-MCNC: 24 MG/DL (ref 7–18)
BUN/CREAT SERPL: 10.1 (ref 10–20)
CALCIUM BLD-MCNC: 9.1 MG/DL (ref 8.5–10.1)
CHLORIDE SERPL-SCNC: 115 MMOL/L (ref 98–112)
CO2 SERPL-SCNC: 16 MMOL/L (ref 21–32)
CREAT BLD-MCNC: 2.38 MG/DL
GFR SERPLBLD BASED ON 1.73 SQ M-ARVRAT: 28 ML/MIN/1.73M2 (ref 60–?)
GLUCOSE BLD-MCNC: 122 MG/DL (ref 70–99)
GLUCOSE BLDC GLUCOMTR-MCNC: 128 MG/DL (ref 70–99)
GLUCOSE BLDC GLUCOMTR-MCNC: 147 MG/DL (ref 70–99)
GLUCOSE BLDC GLUCOMTR-MCNC: 148 MG/DL (ref 70–99)
GLUCOSE BLDC GLUCOMTR-MCNC: 150 MG/DL (ref 70–99)
OSMOLALITY SERPL CALC.SUM OF ELEC: 299 MOSM/KG (ref 275–295)
POTASSIUM SERPL-SCNC: 4.9 MMOL/L (ref 3.5–5.1)
SODIUM SERPL-SCNC: 142 MMOL/L (ref 136–145)

## 2022-10-18 PROCEDURE — 80048 BASIC METABOLIC PNL TOTAL CA: CPT | Performed by: INTERNAL MEDICINE

## 2022-10-18 PROCEDURE — 97161 PT EVAL LOW COMPLEX 20 MIN: CPT

## 2022-10-18 PROCEDURE — 94640 AIRWAY INHALATION TREATMENT: CPT

## 2022-10-18 PROCEDURE — 82962 GLUCOSE BLOOD TEST: CPT

## 2022-10-18 PROCEDURE — 97116 GAIT TRAINING THERAPY: CPT

## 2022-10-18 NOTE — PLAN OF CARE
Monitoring vital signs- stable at this time. Pt became increasingly confused through shift. Monitoring blood glucose levels per order. Tolerating carb  controlled diet. Voiding per Primofit. Eliquis for DVT prophylaxis. Patient denies pain at this time. Up with standby assist and a walker. Encouraged frequent ambulation and use of incentive spirometer. Fall precautions maintained- bed alarm on, bed locked in lowest position, call light and personal belongings within reach, non-skid socks in place to bilateral feet. Frequent rounding by nursing staff. Plan is to dc home with home health pending medical course. Problem: Patient Centered Care  Goal: Patient preferences are identified and integrated in the patient's plan of care  Description: Interventions:  - What would you like us to know as we care for you?  I live at home with my wife and family  - Provide timely, complete, and accurate information to patient/family  - Incorporate patient and family knowledge, values, beliefs, and cultural backgrounds into the planning and delivery of care  - Encourage patient/family to participate in care and decision-making at the level they choose  - Honor patient and family perspectives and choices  Outcome: Progressing     Problem: Diabetes/Glucose Control  Goal: Glucose maintained within prescribed range  Description: INTERVENTIONS:  - Monitor Blood Glucose as ordered  - Assess for signs and symptoms of hyperglycemia and hypoglycemia  - Administer ordered medications to maintain glucose within target range  - Assess barriers to adequate nutritional intake and initiate nutrition consult as needed  - Instruct patient on self management of diabetes  Outcome: Progressing     Problem: Patient/Family Goals  Goal: Patient/Family Long Term Goal  Description: Patient's Long Term Goal: to go home    Interventions:  - Monitor vital signs  - Monitor appropriate labs  - Monitor blood glucose levels  - Administer medications per order  - Pain management as needed   - Follow MD orders  - Diagnostics per order  - Assist with ADLs as needed  - Update / inform patient and family on plan of care  - Discharge planning    - See additional Care Plan goals for specific interventions  Outcome: Progressing  Goal: Patient/Family Short Term Goal  Description: Patient's Short Term Goal: to get stronger    Interventions:   - Monitor vital signs  - Monitor appropriate labs  - Monitor blood glucose levels  - Administer medications per order  - Pain management as needed   - Follow MD orders  - Diagnostics per order  - Assist with ADLs as needed  - Update / inform patient and family on plan of care  - Discharge planning    - See additional Care Plan goals for specific interventions  Outcome: Progressing     Problem: METABOLIC/FLUID AND ELECTROLYTES - ADULT  Goal: Glucose maintained within prescribed range  Description: INTERVENTIONS:  - Monitor Blood Glucose as ordered  - Assess for signs and symptoms of hyperglycemia and hypoglycemia  - Administer ordered medications to maintain glucose within target range  - Assess barriers to adequate nutritional intake and initiate nutrition consult as needed  - Instruct patient on self management of diabetes  Outcome: Progressing  Goal: Electrolytes maintained within normal limits  Description: INTERVENTIONS:  - Monitor labs and rhythm and assess patient for signs and symptoms of electrolyte imbalances  - Administer electrolyte replacement as ordered  - Monitor response to electrolyte replacements, including rhythm and repeat lab results as appropriate  - Fluid restriction as ordered  - Instruct patient on fluid and nutrition restrictions as appropriate  Outcome: Progressing     Problem: MUSCULOSKELETAL - ADULT  Goal: Return mobility to safest level of function  Description: INTERVENTIONS:  - Assess patient stability and activity tolerance for standing, transferring and ambulating w/ or w/o assistive devices  - Assist with transfers and ambulation using safe patient handling equipment as needed  - Ensure adequate protection for wounds/incisions during mobilization  - Obtain PT/OT consults as needed  - Advance activity as appropriate  - Communicate ordered activity level and limitations with patient/family  Outcome: Progressing     Problem: Impaired Functional Mobility  Goal: Achieve highest/safest level of mobility/gait  Description: Interventions:  - Assess patient's functional ability and stability  - Promote increasing activity/tolerance for mobility and gait  - Educate and engage patient/family in tolerated activity level and precautions  Outcome: Progressing

## 2022-10-18 NOTE — PLAN OF CARE
Pt alert and oriented x 2. On RA. Pt had 2 occurrences of incontinence this shift. Primofit placed. PRN Hydralazine given at 2315 for elevated BP. ACHS. Fall precautions in place with call light within reach. Problem: Patient Centered Care  Goal: Patient preferences are identified and integrated in the patient's plan of care  Description: Interventions:  - What would you like us to know as we care for you?  I live at home with my wife and family  - Provide timely, complete, and accurate information to patient/family  - Incorporate patient and family knowledge, values, beliefs, and cultural backgrounds into the planning and delivery of care  - Encourage patient/family to participate in care and decision-making at the level they choose  - Honor patient and family perspectives and choices  Outcome: Progressing     Problem: Patient/Family Goals  Goal: Patient/Family Long Term Goal  Description: Patient's Long Term Goal: to go home    Interventions:  - Monitor vital signs  - Monitor appropriate labs  - Monitor blood glucose levels  - Administer medications per order  - Pain management as needed   - Follow MD orders  - Diagnostics per order  - Assist with ADLs as needed  - Update / inform patient and family on plan of care  - Discharge planning  - See additional Care Plan goals for specific interventions  Outcome: Progressing    Goal: Patient/Family Short Term Goal  Description: Patient's Short Term Goal: to get stronger  Interventions:   - Monitor vital signs  - Monitor appropriate labs  - Monitor blood glucose levels  - Administer medications per order  - Pain management as needed   - Follow MD orders  - Diagnostics per order  - Assist with ADLs as needed  - Update / inform patient and family on plan of care  - Discharge planning    - See additional Care Plan goals for specific interventions  Outcome: Progressing      Problem: METABOLIC/FLUID AND ELECTROLYTES - ADULT  Goal: Glucose maintained within prescribed range  Description: INTERVENTIONS:  - Monitor Blood Glucose as ordered  - Assess for signs and symptoms of hyperglycemia and hypoglycemia  - Administer ordered medications to maintain glucose within target range  - Assess barriers to adequate nutritional intake and initiate nutrition consult as needed  - Instruct patient on self management of diabetes  Outcome: Progressing  Goal: Electrolytes maintained within normal limits  Description: INTERVENTIONS:  - Monitor labs and rhythm and assess patient for signs and symptoms of electrolyte imbalances  - Administer electrolyte replacement as ordered  - Monitor response to electrolyte replacements, including rhythm and repeat lab results as appropriate  - Fluid restriction as ordered  - Instruct patient on fluid and nutrition restrictions as appropriate  Outcome: Progressing    Problem: MUSCULOSKELETAL - ADULT  Goal: Return mobility to safest level of function  Description: INTERVENTIONS:  - Assess patient stability and activity tolerance for standing, transferring and ambulating w/ or w/o assistive devices  - Assist with transfers and ambulation using safe patient handling equipment as needed  - Ensure adequate protection for wounds/incisions during mobilization  - Obtain PT/OT consults as needed  - Advance activity as appropriate  - Communicate ordered activity level and limitations with patient/family  Outcome: Progressing     Problem: Impaired Functional Mobility  Goal: Achieve highest/safest level of mobility/gait  Description: Interventions:  - Assess patient's functional ability and stability  - Promote increasing activity/tolerance for mobility and gait  - Educate and engage patient/family in tolerated activity level and precautions  Outcome: Progressing Patient a 26 y/o single  male, domiciled with his mother for a week, past Psychiatric hx of Depression, 2 prior Psychiatric Hospitalization, hx of SI but no SA, hx of THC use, no medical issues was BIB/family as he was not acting right.    Patient in bed, in hospital [facility], endorsed that he has been depressed for years, visited ED 3 times, served the army for 4 years, and had an honorable discharge and was staying by self in [place] for 6 weeks, since he left the army and now staying with his mother for 1-2 weeks. He feels that he is depressed, with limited motivation, stays in bed, tired, fatigued and prefers to stay inside, not going out, and has few to no friends. He added that he is depressed, anxious with poor sleep/Appetite, prefers to sleep during the day and not able to sleep at night.  He at times endorsed that he hears voices, last heard a few minutes back, endorses getting annoyed after the voices, tries to ignore them, but hears a sound and not able to distinguish what type of sound it is. He has poor sleep/appetite. 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 He added that he smokes THC every other day, and started to smoke since age 19. He denied other drug abuse, stopped smoking cigarettes only a few days back. He endorses that he is tormented, and tried to suffocate himself by dipping his head in a bucket of water. He added that he is depressed, anxious and feels de-personalized at times. He explains that he feels that he is able to observe himself from outside, feels that something is going in his brain. He was hospitalized times in [place], and was given Zoloft/Prozac which he took for few days to weeks and then stopped taking them. Patient a 26 y/o single  male, domiciled with his mother for a week, past Psychiatric hx of Depression, 2 prior Psychiatric Hospitalization, hx of SI but no SA, hx of THC use, no medical issues was BIB/family as he was not acting right.    Patient in bed, in hospital [facility], endorsed that he has been depressed for years, visited ED 3 times, served the army for 4 years, and had an honorable discharge and was staying by self in [place] for 6 weeks, since he left the army and now staying with his mother for 1-2 weeks. He feels that he is depressed, with limited motivation, stays in bed, tired, fatigued and prefers to stay inside, not going out, and has few to no friends. He added that he is depressed, anxious with poor sleep/Appetite, prefers to sleep during the day and not able to sleep at night.  He at times endorsed that he hears voices, last heard a few minutes back, endorses getting annoyed after the voices, tries to ignore them, but hears a sound and not able to distinguish what type of sound it is. He has poor sleep/appetite. 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 He added that he smokes THC every other day, and started to smoke since age 19. He denied other drug abuse, stopped smoking cigarettes only a few days back. He endorses that he is tormented, and tried to suffocate himself by dipping his head in a bucket of water. He added that he is depressed, anxious and feels de-personalized at times. He explains that he feels that he is able to observe himself from outside, feels that something is going in his brain. He was hospitalized 2 times in [place], and was given Zoloft/Prozac which he took for few days to weeks and then stopped taking them.

## 2022-10-19 VITALS
BODY MASS INDEX: 31.7 KG/M2 | DIASTOLIC BLOOD PRESSURE: 67 MMHG | HEART RATE: 86 BPM | SYSTOLIC BLOOD PRESSURE: 122 MMHG | HEIGHT: 63 IN | OXYGEN SATURATION: 100 % | RESPIRATION RATE: 16 BRPM | TEMPERATURE: 98 F | WEIGHT: 178.88 LBS

## 2022-10-19 LAB
ANION GAP SERPL CALC-SCNC: 8 MMOL/L (ref 0–18)
BUN BLD-MCNC: 34 MG/DL (ref 7–18)
BUN/CREAT SERPL: 12 (ref 10–20)
CALCIUM BLD-MCNC: 9.3 MG/DL (ref 8.5–10.1)
CHLORIDE SERPL-SCNC: 110 MMOL/L (ref 98–112)
CO2 SERPL-SCNC: 23 MMOL/L (ref 21–32)
CREAT BLD-MCNC: 2.84 MG/DL
DEPRECATED RDW RBC AUTO: 42.5 FL (ref 35.1–46.3)
ERYTHROCYTE [DISTWIDTH] IN BLOOD BY AUTOMATED COUNT: 12.6 % (ref 11–15)
GFR SERPLBLD BASED ON 1.73 SQ M-ARVRAT: 22 ML/MIN/1.73M2 (ref 60–?)
GLUCOSE BLD-MCNC: 115 MG/DL (ref 70–99)
GLUCOSE BLDC GLUCOMTR-MCNC: 108 MG/DL (ref 70–99)
GLUCOSE BLDC GLUCOMTR-MCNC: 157 MG/DL (ref 70–99)
GLUCOSE BLDC GLUCOMTR-MCNC: 161 MG/DL (ref 70–99)
GLUCOSE BLDC GLUCOMTR-MCNC: 174 MG/DL (ref 70–99)
HCT VFR BLD AUTO: 44.8 %
HGB BLD-MCNC: 15.5 G/DL
MCH RBC QN AUTO: 31.8 PG (ref 26–34)
MCHC RBC AUTO-ENTMCNC: 34.6 G/DL (ref 31–37)
MCV RBC AUTO: 92 FL
OSMOLALITY SERPL CALC.SUM OF ELEC: 301 MOSM/KG (ref 275–295)
PLATELET # BLD AUTO: 185 10(3)UL (ref 150–450)
POTASSIUM SERPL-SCNC: 4.7 MMOL/L (ref 3.5–5.1)
RBC # BLD AUTO: 4.87 X10(6)UL
SODIUM SERPL-SCNC: 141 MMOL/L (ref 136–145)
WBC # BLD AUTO: 7.8 X10(3) UL (ref 4–11)

## 2022-10-19 PROCEDURE — 90471 IMMUNIZATION ADMIN: CPT

## 2022-10-19 PROCEDURE — 97166 OT EVAL MOD COMPLEX 45 MIN: CPT

## 2022-10-19 PROCEDURE — 82962 GLUCOSE BLOOD TEST: CPT

## 2022-10-19 PROCEDURE — 97535 SELF CARE MNGMENT TRAINING: CPT

## 2022-10-19 PROCEDURE — 85027 COMPLETE CBC AUTOMATED: CPT | Performed by: INTERNAL MEDICINE

## 2022-10-19 PROCEDURE — 80048 BASIC METABOLIC PNL TOTAL CA: CPT | Performed by: INTERNAL MEDICINE

## 2022-10-19 NOTE — DISCHARGE PLANNING
Patient chart reviewed for discharge: Medication Reconciliation completed, Specialist/PCP follow up listed, and disease specific Instructions/Education included in After Visit Summary. Discharge RN notified patient's RN of AVS completion. Patient's RN to notify DC RN if discharge status changes.       Shama Melgar RN, Discharge Leader

## 2022-10-19 NOTE — CM/SW NOTE
10/19/22 1000   CM/SW Referral Data   Referral Source Physician   Reason for Referral Discharge planning   Informant Patient;Spouse/Significant Other   Pertinent Medical Hx   Does patient have an established PCP? Yes  Jarek Lopez   Patient Info   Patient's Current Mental Status at Time of Assessment Alert;Oriented;Memory Impairments   Patient's [address]   Patient lives with Spouse/Significant other   Patient Status Prior to Admission   Independent with ADLs and Mobility Yes   Discharge Needs   Anticipated D/C needs Home health care   Choice of Post-Acute Provider   Informed patient of right to choose their preferred provider Yes     Pt discussed during nursing rounds. DX UTI on IV abx, weakness. From home w/spouse, independent prior to dx. PT recommending HH w/PT at dc, pt and spouse agreeable to dc recommendation. [facility] referrals sent in [place], [place] completed. List of accepting facilities will be needed for choice. Plan: Home w/spouse with [facility] pending agency choice and medical clearance. / to remain available for support and/or discharge planning.      DARYL ChinN    [phone]

## 2022-10-19 NOTE — PLAN OF CARE
Pt oriented x 2. Vitals stable. High fall risk. Attempted to get out of bed at times. No complaints of pain. Frequently reoriented to surroundings. Safety precautions maintained. Bed alarm is on and frequent rounding by staff. Problem: Patient Centered Care  Goal: Patient preferences are identified and integrated in the patient's plan of care  Description: Interventions:  - What would you like us to know as we care for you?  I live at home with my wife and family  - Provide timely, complete, and accurate information to patient/family  - Incorporate patient and family knowledge, values, beliefs, and cultural backgrounds into the planning and delivery of care  - Encourage patient/family to participate in care and decision-making at the level they choose  - Honor patient and family perspectives and choices  Outcome: Progressing     Problem: Diabetes/Glucose Control  Goal: Glucose maintained within prescribed range  Description: INTERVENTIONS:  - Monitor Blood Glucose as ordered  - Assess for signs and symptoms of hyperglycemia and hypoglycemia  - Administer ordered medications to maintain glucose within target range  - Assess barriers to adequate nutritional intake and initiate nutrition consult as needed  - Instruct patient on self management of diabetes  Outcome: Progressing     Problem: Patient/Family Goals  Goal: Patient/Family Long Term Goal  Description: Patient's Long Term Goal: to go home    Interventions:  - Monitor vital signs  - Monitor appropriate labs  - Monitor blood glucose levels  - Administer medications per order  - Pain management as needed   - Follow MD orders  - Diagnostics per order  - Assist with ADLs as needed  - Update / inform patient and family on plan of care  - Discharge planning    - See additional Care Plan goals for specific interventions  Outcome: Progressing  Goal: Patient/Family Short Term Goal  Description: Patient's Short Term Goal: to get stronger    Interventions:   - Monitor vital signs  - Monitor appropriate labs  - Monitor blood glucose levels  - Administer medications per order  - Pain management as needed   - Follow MD orders  - Diagnostics per order  - Assist with ADLs as needed  - Update / inform patient and family on plan of care  - Discharge planning    - See additional Care Plan goals for specific interventions  Outcome: Progressing     Problem: METABOLIC/FLUID AND ELECTROLYTES - ADULT  Goal: Glucose maintained within prescribed range  Description: INTERVENTIONS:  - Monitor Blood Glucose as ordered  - Assess for signs and symptoms of hyperglycemia and hypoglycemia  - Administer ordered medications to maintain glucose within target range  - Assess barriers to adequate nutritional intake and initiate nutrition consult as needed  - Instruct patient on self management of diabetes  Outcome: Progressing  Goal: Electrolytes maintained within normal limits  Description: INTERVENTIONS:  - Monitor labs and rhythm and assess patient for signs and symptoms of electrolyte imbalances  - Administer electrolyte replacement as ordered  - Monitor response to electrolyte replacements, including rhythm and repeat lab results as appropriate  - Fluid restriction as ordered  - Instruct patient on fluid and nutrition restrictions as appropriate  Outcome: Progressing     Problem: MUSCULOSKELETAL - ADULT  Goal: Return mobility to safest level of function  Description: INTERVENTIONS:  - Assess patient stability and activity tolerance for standing, transferring and ambulating w/ or w/o assistive devices  - Assist with transfers and ambulation using safe patient handling equipment as needed  - Ensure adequate protection for wounds/incisions during mobilization  - Obtain PT/OT consults as needed  - Advance activity as appropriate  - Communicate ordered activity level and limitations with patient/family  Outcome: Progressing     Problem: Impaired Functional Mobility  Goal: Achieve highest/safest level of mobility/gait  Description: Interventions:  - Assess patient's functional ability and stability  - Promote increasing activity/tolerance for mobility and gait  - Educate and engage patient/family in tolerated activity level and precautions  Outcome: Progressing

## 2022-10-19 NOTE — PLAN OF CARE
Patient remains alert & oriented x2. Patient afebrile. Patient ambulating in room with SBA and RW. Patient denies any pain. Patient and wife updated on POC. Patient's wife verbalized understanding of POC. Patient requires reinforcement of teaching. Problem: Patient Centered Care  Goal: Patient preferences are identified and integrated in the patient's plan of care  Description: Interventions:  - What would you like us to know as we care for you?  I live at home with my wife and family  - Provide timely, complete, and accurate information to patient/family  - Incorporate patient and family knowledge, values, beliefs, and cultural backgrounds into the planning and delivery of care  - Encourage patient/family to participate in care and decision-making at the level they choose  - Honor patient and family perspectives and choices  Outcome: Progressing     Problem: Diabetes/Glucose Control  Goal: Glucose maintained within prescribed range  Description: INTERVENTIONS:  - Monitor Blood Glucose as ordered  - Assess for signs and symptoms of hyperglycemia and hypoglycemia  - Administer ordered medications to maintain glucose within target range  - Assess barriers to adequate nutritional intake and initiate nutrition consult as needed  - Instruct patient on self management of diabetes  Outcome: Progressing     Problem: Patient/Family Goals  Goal: Patient/Family Long Term Goal  Description: Patient's Long Term Goal: to go home    Interventions:  - Monitor vital signs  - Monitor appropriate labs  - Monitor blood glucose levels  - Administer medications per order  - Pain management as needed   - Follow MD orders  - Diagnostics per order  - Assist with ADLs as needed  - Update / inform patient and family on plan of care  - Discharge planning    - See additional Care Plan goals for specific interventions  Outcome: Progressing  Goal: Patient/Family Short Term Goal  Description: Patient's Short Term Goal: to get stronger    Interventions:   - Monitor vital signs  - Monitor appropriate labs  - Monitor blood glucose levels  - Administer medications per order  - Pain management as needed   - Follow MD orders  - Diagnostics per order  - Assist with ADLs as needed  - Update / inform patient and family on plan of care  - Discharge planning    - See additional Care Plan goals for specific interventions  Outcome: Progressing     Problem: METABOLIC/FLUID AND ELECTROLYTES - ADULT  Goal: Glucose maintained within prescribed range  Description: INTERVENTIONS:  - Monitor Blood Glucose as ordered  - Assess for signs and symptoms of hyperglycemia and hypoglycemia  - Administer ordered medications to maintain glucose within target range  - Assess barriers to adequate nutritional intake and initiate nutrition consult as needed  - Instruct patient on self management of diabetes  Outcome: Progressing  Goal: Electrolytes maintained within normal limits  Description: INTERVENTIONS:  - Monitor labs and rhythm and assess patient for signs and symptoms of electrolyte imbalances  - Administer electrolyte replacement as ordered  - Monitor response to electrolyte replacements, including rhythm and repeat lab results as appropriate  - Fluid restriction as ordered  - Instruct patient on fluid and nutrition restrictions as appropriate  Outcome: Progressing     Problem: MUSCULOSKELETAL - ADULT  Goal: Return mobility to safest level of function  Description: INTERVENTIONS:  - Assess patient stability and activity tolerance for standing, transferring and ambulating w/ or w/o assistive devices  - Assist with transfers and ambulation using safe patient handling equipment as needed  - Ensure adequate protection for wounds/incisions during mobilization  - Obtain PT/OT consults as needed  - Advance activity as appropriate  - Communicate ordered activity level and limitations with patient/family  Outcome: Progressing     Problem: Impaired Functional Mobility  Goal: Achieve highest/safest level of mobility/gait  Description: Interventions:  - Assess patient's functional ability and stability  - Promote increasing activity/tolerance for mobility and gait  - Educate and engage patient/family in tolerated activity level and precautions  Outcome: Progressing

## 2022-10-19 NOTE — DISCHARGE INSTRUCTIONS
Sometimes managing your health at home requires assistance. The [facility] team has recognized your preference to use [facility]. They can be reached by phone at [phone]. The fax number for your reference is [phone]. A representative from the home health agency will contact you or your family to schedule your first visit.

## 2022-10-19 NOTE — CM/SW NOTE
10/19/22 1600   Discharge disposition   Expected discharge disposition Home-Health   Post Acute Care Provider [facility]   Discharge transportation Private car     Pt discussed during nursing rounds. Pt is stable for dc today. MD dc order entered. MARLENE provided list of accepting [address] is chosen provider for RN and PT services at [place], [employer] and [facility] notified of patient's choice and dc home today. Pt's family will provide transport at [facility].    [phone]: Pt and spouse requesting assist w/transport home. [address] [facility] w/lift assist scheduled for 8pm.    Plan: Home w/[facility] today. / to remain available for support and/or discharge planning.      DARYL ChambersN    [phone]

## 2022-10-20 NOTE — PROGRESS NOTES
Discharge instructions reviewed with patient and his wife Carmen Yadav. Fernandez verbalized understanding of discharge instructions. Patient is only alert & oriented x2 and requires reinforcement of instructions. Patient set for pickup by [facility] at 2000. Bag of belongings at bedside to transport with patient. Endorsed care to oncoming RN.

## 2022-10-20 NOTE — HOME CARE LIAISON
Received referral via [facility] for Home Health services. Pt discharged late 10/19. Spoke w/ patient's wife via phone and discussed list of [facility] providers from [address] [place] [address], choice is [facility] (pt has hx of [facility]). Agency reserved in [address] [place] [address] and contact information placed on AVS by [facility]. Financial interest disclosure provided. Notified [facility].

## 2022-10-21 ENCOUNTER — PATIENT OUTREACH (OUTPATIENT)
Dept: CASE MANAGEMENT | Age: 77
End: 2022-10-21

## 2022-10-21 NOTE — PROGRESS NOTES
1st attempt dm hfu questions and apt request  Pt wife Fredrick Dale answered, stated she wants to wait to schedule apts until next week. She wants him to start therapy first so she can get him up and down the stairs better. Pt wife jacy requested a call back next week wed (10/26).

## 2022-10-26 NOTE — PROGRESS NOTES
2nd attempt dm hfu questions and apt request    Екатерина Crespo  [address]  cece [address] parking lot  Nov 11 @9am  Glucose monitoring machine, labs and logs of sugar levels    Pt wife shannan answered all questions on behalf of pt  Closing encounter  Diabetic Outreach D/C Follow Up Questions:     1. Did you receive the information provided during your hospitalization and at discharge about diabetes?  no    If No:  Would you like us to mail you the information? yes   If Yes:  Was the information helpful? No              2. Do you have all of your diabetes medications now that you are home? Yes  If yes:  do you understand how to take your medications. yes    If No: Are there barriers to getting your medications? no       3. Did you make the necessary transportation arrangements to get to your diabetes follow-up appointment? Yes         If pt answers No to questions #2 and #3 Advise pt you will have a clinical person contact them to address.

## 2022-11-09 ENCOUNTER — PATIENT OUTREACH (OUTPATIENT)
Dept: CASE MANAGEMENT | Age: 77
End: 2022-11-09

## 2022-12-02 ENCOUNTER — APPOINTMENT (OUTPATIENT)
Dept: CT IMAGING | Facility: HOSPITAL | Age: 77
End: 2022-12-02
Attending: STUDENT IN AN ORGANIZED HEALTH CARE EDUCATION/TRAINING PROGRAM
Payer: MEDICARE

## 2022-12-02 ENCOUNTER — HOSPITAL ENCOUNTER (INPATIENT)
Facility: HOSPITAL | Age: 77
LOS: 4 days | Discharge: NURSING, REHAB OR LONG-TERM CARE | End: 2022-12-06
Attending: STUDENT IN AN ORGANIZED HEALTH CARE EDUCATION/TRAINING PROGRAM | Admitting: HOSPITALIST
Payer: MEDICARE

## 2022-12-02 ENCOUNTER — APPOINTMENT (OUTPATIENT)
Dept: GENERAL RADIOLOGY | Facility: HOSPITAL | Age: 77
End: 2022-12-02
Attending: STUDENT IN AN ORGANIZED HEALTH CARE EDUCATION/TRAINING PROGRAM
Payer: MEDICARE

## 2022-12-02 DIAGNOSIS — R53.1 WEAKNESS GENERALIZED: ICD-10-CM

## 2022-12-02 DIAGNOSIS — N30.00 ACUTE CYSTITIS WITHOUT HEMATURIA: Primary | ICD-10-CM

## 2022-12-02 LAB
ANION GAP SERPL CALC-SCNC: 9 MMOL/L (ref 0–18)
ATRIAL RATE: 73 BPM
BASOPHILS # BLD AUTO: 0.06 X10(3) UL (ref 0–0.2)
BASOPHILS NFR BLD AUTO: 0.6 %
BILIRUB UR QL: NEGATIVE
BUN BLD-MCNC: 41 MG/DL (ref 7–18)
BUN/CREAT SERPL: 13.5 (ref 10–20)
CALCIUM BLD-MCNC: 9.7 MG/DL (ref 8.5–10.1)
CHLORIDE SERPL-SCNC: 113 MMOL/L (ref 98–112)
CO2 SERPL-SCNC: 20 MMOL/L (ref 21–32)
COLOR UR: YELLOW
CREAT BLD-MCNC: 3.03 MG/DL
DEPRECATED RDW RBC AUTO: 44.3 FL (ref 35.1–46.3)
EOSINOPHIL # BLD AUTO: 0.17 X10(3) UL (ref 0–0.7)
EOSINOPHIL NFR BLD AUTO: 1.6 %
ERYTHROCYTE [DISTWIDTH] IN BLOOD BY AUTOMATED COUNT: 13 % (ref 11–15)
FLUAV + FLUBV RNA SPEC NAA+PROBE: NEGATIVE
FLUAV + FLUBV RNA SPEC NAA+PROBE: NEGATIVE
GFR SERPLBLD BASED ON 1.73 SQ M-ARVRAT: 21 ML/MIN/1.73M2 (ref 60–?)
GLUCOSE BLD-MCNC: 116 MG/DL (ref 70–99)
GLUCOSE BLDC GLUCOMTR-MCNC: 80 MG/DL (ref 70–99)
GLUCOSE BLDC GLUCOMTR-MCNC: 86 MG/DL (ref 70–99)
GLUCOSE UR-MCNC: >=500 MG/DL
HCT VFR BLD AUTO: 43.6 %
HGB BLD-MCNC: 14.8 G/DL
IMM GRANULOCYTES # BLD AUTO: 0.03 X10(3) UL (ref 0–1)
IMM GRANULOCYTES NFR BLD: 0.3 %
KETONES UR-MCNC: NEGATIVE MG/DL
LYMPHOCYTES # BLD AUTO: 2.19 X10(3) UL (ref 1–4)
LYMPHOCYTES NFR BLD AUTO: 21.2 %
MCH RBC QN AUTO: 31.7 PG (ref 26–34)
MCHC RBC AUTO-ENTMCNC: 33.9 G/DL (ref 31–37)
MCV RBC AUTO: 93.4 FL
MONOCYTES # BLD AUTO: 0.69 X10(3) UL (ref 0.1–1)
MONOCYTES NFR BLD AUTO: 6.7 %
NEUTROPHILS # BLD AUTO: 7.17 X10 (3) UL (ref 1.5–7.7)
NEUTROPHILS # BLD AUTO: 7.17 X10(3) UL (ref 1.5–7.7)
NEUTROPHILS NFR BLD AUTO: 69.6 %
NITRITE UR QL STRIP.AUTO: NEGATIVE
NT-PROBNP SERPL-MCNC: 205 PG/ML (ref ?–450)
OSMOLALITY SERPL CALC.SUM OF ELEC: 305 MOSM/KG (ref 275–295)
P AXIS: 34 DEGREES
P-R INTERVAL: 176 MS
PH UR: 5 [PH] (ref 5–8)
PLATELET # BLD AUTO: 184 10(3)UL (ref 150–450)
POTASSIUM SERPL-SCNC: 4.3 MMOL/L (ref 3.5–5.1)
PROCALCITONIN SERPL-MCNC: 0.13 NG/ML (ref ?–0.16)
PROT UR-MCNC: 100 MG/DL
Q-T INTERVAL: 360 MS
QRS DURATION: 104 MS
QTC CALCULATION (BEZET): 396 MS
R AXIS: 16 DEGREES
RBC # BLD AUTO: 4.67 X10(6)UL
RSV RNA SPEC NAA+PROBE: NEGATIVE
SARS-COV-2 RNA RESP QL NAA+PROBE: NOT DETECTED
SODIUM SERPL-SCNC: 142 MMOL/L (ref 136–145)
SP GR UR STRIP: 1.01 (ref 1–1.03)
T AXIS: -71 DEGREES
TROPONIN I HIGH SENSITIVITY: 12 NG/L
UROBILINOGEN UR STRIP-ACNC: <2
VENTRICULAR RATE: 73 BPM
VIT C UR-MCNC: NEGATIVE MG/DL
WBC # BLD AUTO: 10.3 X10(3) UL (ref 4–11)
WBC #/AREA URNS AUTO: >50 /HPF
WBC CLUMPS UR QL AUTO: PRESENT /HPF

## 2022-12-02 PROCEDURE — 87086 URINE CULTURE/COLONY COUNT: CPT | Performed by: STUDENT IN AN ORGANIZED HEALTH CARE EDUCATION/TRAINING PROGRAM

## 2022-12-02 PROCEDURE — 93005 ELECTROCARDIOGRAM TRACING: CPT

## 2022-12-02 PROCEDURE — 71045 X-RAY EXAM CHEST 1 VIEW: CPT | Performed by: STUDENT IN AN ORGANIZED HEALTH CARE EDUCATION/TRAINING PROGRAM

## 2022-12-02 PROCEDURE — 83880 ASSAY OF NATRIURETIC PEPTIDE: CPT | Performed by: STUDENT IN AN ORGANIZED HEALTH CARE EDUCATION/TRAINING PROGRAM

## 2022-12-02 PROCEDURE — 70450 CT HEAD/BRAIN W/O DYE: CPT | Performed by: STUDENT IN AN ORGANIZED HEALTH CARE EDUCATION/TRAINING PROGRAM

## 2022-12-02 PROCEDURE — 0241U SARS-COV-2/FLU A AND B/RSV BY PCR (GENEXPERT): CPT | Performed by: STUDENT IN AN ORGANIZED HEALTH CARE EDUCATION/TRAINING PROGRAM

## 2022-12-02 PROCEDURE — 74176 CT ABD & PELVIS W/O CONTRAST: CPT | Performed by: STUDENT IN AN ORGANIZED HEALTH CARE EDUCATION/TRAINING PROGRAM

## 2022-12-02 PROCEDURE — 80048 BASIC METABOLIC PNL TOTAL CA: CPT | Performed by: STUDENT IN AN ORGANIZED HEALTH CARE EDUCATION/TRAINING PROGRAM

## 2022-12-02 PROCEDURE — 87088 URINE BACTERIA CULTURE: CPT | Performed by: STUDENT IN AN ORGANIZED HEALTH CARE EDUCATION/TRAINING PROGRAM

## 2022-12-02 PROCEDURE — 85025 COMPLETE CBC W/AUTO DIFF WBC: CPT | Performed by: STUDENT IN AN ORGANIZED HEALTH CARE EDUCATION/TRAINING PROGRAM

## 2022-12-02 PROCEDURE — 81001 URINALYSIS AUTO W/SCOPE: CPT | Performed by: STUDENT IN AN ORGANIZED HEALTH CARE EDUCATION/TRAINING PROGRAM

## 2022-12-02 PROCEDURE — 84145 PROCALCITONIN (PCT): CPT | Performed by: STUDENT IN AN ORGANIZED HEALTH CARE EDUCATION/TRAINING PROGRAM

## 2022-12-02 PROCEDURE — 96374 THER/PROPH/DIAG INJ IV PUSH: CPT

## 2022-12-02 PROCEDURE — 82962 GLUCOSE BLOOD TEST: CPT

## 2022-12-02 PROCEDURE — 84484 ASSAY OF TROPONIN QUANT: CPT | Performed by: STUDENT IN AN ORGANIZED HEALTH CARE EDUCATION/TRAINING PROGRAM

## 2022-12-02 PROCEDURE — 93010 ELECTROCARDIOGRAM REPORT: CPT | Performed by: STUDENT IN AN ORGANIZED HEALTH CARE EDUCATION/TRAINING PROGRAM

## 2022-12-02 PROCEDURE — 99285 EMERGENCY DEPT VISIT HI MDM: CPT

## 2022-12-02 PROCEDURE — 87186 SC STD MICRODIL/AGAR DIL: CPT | Performed by: STUDENT IN AN ORGANIZED HEALTH CARE EDUCATION/TRAINING PROGRAM

## 2022-12-02 RX ORDER — ATORVASTATIN CALCIUM 20 MG/1
20 TABLET, FILM COATED ORAL NIGHTLY
Status: DISCONTINUED | OUTPATIENT
Start: 2022-12-02 | End: 2022-12-06

## 2022-12-02 RX ORDER — SODIUM CHLORIDE 9 MG/ML
INJECTION, SOLUTION INTRAVENOUS CONTINUOUS
Status: ACTIVE | OUTPATIENT
Start: 2022-12-02 | End: 2022-12-02

## 2022-12-02 RX ORDER — METOCLOPRAMIDE HYDROCHLORIDE 5 MG/ML
5 INJECTION INTRAMUSCULAR; INTRAVENOUS EVERY 8 HOURS PRN
Status: DISCONTINUED | OUTPATIENT
Start: 2022-12-02 | End: 2022-12-06

## 2022-12-02 RX ORDER — TAMSULOSIN HYDROCHLORIDE 0.4 MG/1
0.4 CAPSULE ORAL DAILY
Status: DISCONTINUED | OUTPATIENT
Start: 2022-12-03 | End: 2022-12-06

## 2022-12-02 RX ORDER — ONDANSETRON 2 MG/ML
4 INJECTION INTRAMUSCULAR; INTRAVENOUS EVERY 6 HOURS PRN
Status: DISCONTINUED | OUTPATIENT
Start: 2022-12-02 | End: 2022-12-06

## 2022-12-02 RX ORDER — DORZOLAMIDE HYDROCHLORIDE AND TIMOLOL MALEATE 20; 5 MG/ML; MG/ML
1 SOLUTION/ DROPS OPHTHALMIC 2 TIMES DAILY
Status: DISCONTINUED | OUTPATIENT
Start: 2022-12-02 | End: 2022-12-06

## 2022-12-02 RX ORDER — ACETAMINOPHEN 500 MG
500 TABLET ORAL EVERY 4 HOURS PRN
Status: DISCONTINUED | OUTPATIENT
Start: 2022-12-02 | End: 2022-12-06

## 2022-12-02 RX ORDER — ASPIRIN 81 MG/1
81 TABLET ORAL DAILY
Status: DISCONTINUED | OUTPATIENT
Start: 2022-12-03 | End: 2022-12-06

## 2022-12-02 RX ORDER — ALBUTEROL SULFATE 90 UG/1
2 AEROSOL, METERED RESPIRATORY (INHALATION) EVERY 6 HOURS PRN
Status: DISCONTINUED | OUTPATIENT
Start: 2022-12-02 | End: 2022-12-06

## 2022-12-02 RX ORDER — COLCHICINE 0.6 MG/1
0.6 TABLET ORAL DAILY
Status: DISCONTINUED | OUTPATIENT
Start: 2022-12-03 | End: 2022-12-06

## 2022-12-02 NOTE — ED QUICK NOTES
Orders for admission, patient is aware of plan and ready to go upstairs. Any questions, please call ED RN Sridhar Kaur at extension [phone].      Patient Covid vaccination status: Partially vaccinated     COVID Test Ordered in ED: SARS-CoV-2/Flu A and B/RSV by PCR (GeneXpert)    COVID Suspicion at Admission: N/A    Running Infusions:  None    Mental Status/LOC at time of transport: AOX3    Other pertinent information:   CIWA score: N/A   NIH score:  N/A

## 2022-12-03 LAB
ANION GAP SERPL CALC-SCNC: 9 MMOL/L (ref 0–18)
BUN BLD-MCNC: 40 MG/DL (ref 7–18)
BUN/CREAT SERPL: 16.1 (ref 10–20)
CALCIUM BLD-MCNC: 9.4 MG/DL (ref 8.5–10.1)
CHLORIDE SERPL-SCNC: 119 MMOL/L (ref 98–112)
CO2 SERPL-SCNC: 15 MMOL/L (ref 21–32)
CREAT BLD-MCNC: 2.49 MG/DL
DEPRECATED RDW RBC AUTO: 46.6 FL (ref 35.1–46.3)
ERYTHROCYTE [DISTWIDTH] IN BLOOD BY AUTOMATED COUNT: 13 % (ref 11–15)
GFR SERPLBLD BASED ON 1.73 SQ M-ARVRAT: 26 ML/MIN/1.73M2 (ref 60–?)
GLUCOSE BLD-MCNC: 81 MG/DL (ref 70–99)
GLUCOSE BLDC GLUCOMTR-MCNC: 131 MG/DL (ref 70–99)
GLUCOSE BLDC GLUCOMTR-MCNC: 151 MG/DL (ref 70–99)
GLUCOSE BLDC GLUCOMTR-MCNC: 77 MG/DL (ref 70–99)
GLUCOSE BLDC GLUCOMTR-MCNC: 96 MG/DL (ref 70–99)
HCT VFR BLD AUTO: 48.6 %
HGB BLD-MCNC: 15.5 G/DL
MCH RBC QN AUTO: 31.1 PG (ref 26–34)
MCHC RBC AUTO-ENTMCNC: 31.9 G/DL (ref 31–37)
MCV RBC AUTO: 97.4 FL
OSMOLALITY SERPL CALC.SUM OF ELEC: 305 MOSM/KG (ref 275–295)
PLATELET # BLD AUTO: 162 10(3)UL (ref 150–450)
POTASSIUM SERPL-SCNC: 4.5 MMOL/L (ref 3.5–5.1)
RBC # BLD AUTO: 4.99 X10(6)UL
SODIUM SERPL-SCNC: 143 MMOL/L (ref 136–145)
WBC # BLD AUTO: 8.2 X10(3) UL (ref 4–11)

## 2022-12-03 PROCEDURE — 82962 GLUCOSE BLOOD TEST: CPT

## 2022-12-03 PROCEDURE — 80048 BASIC METABOLIC PNL TOTAL CA: CPT | Performed by: HOSPITALIST

## 2022-12-03 PROCEDURE — 97167 OT EVAL HIGH COMPLEX 60 MIN: CPT

## 2022-12-03 PROCEDURE — 97530 THERAPEUTIC ACTIVITIES: CPT

## 2022-12-03 PROCEDURE — 85027 COMPLETE CBC AUTOMATED: CPT | Performed by: HOSPITALIST

## 2022-12-03 PROCEDURE — 92610 EVALUATE SWALLOWING FUNCTION: CPT

## 2022-12-03 PROCEDURE — 97535 SELF CARE MNGMENT TRAINING: CPT

## 2022-12-03 RX ORDER — POLYETHYLENE GLYCOL 3350 17 G/17G
17 POWDER, FOR SOLUTION ORAL DAILY PRN
Status: DISCONTINUED | OUTPATIENT
Start: 2022-12-03 | End: 2022-12-06

## 2022-12-03 RX ORDER — DOCUSATE SODIUM 100 MG/1
100 CAPSULE, LIQUID FILLED ORAL 2 TIMES DAILY
Status: DISCONTINUED | OUTPATIENT
Start: 2022-12-03 | End: 2022-12-03

## 2022-12-03 RX ORDER — BISACODYL 10 MG
10 SUPPOSITORY, RECTAL RECTAL
Status: DISCONTINUED | OUTPATIENT
Start: 2022-12-03 | End: 2022-12-06

## 2022-12-03 NOTE — PLAN OF CARE
Problem: Patient Centered Care  Goal: Patient preferences are identified and integrated in the patient's plan of care  Description: Interventions:  - What would you like us to know as we care for you? Pt is from home with wife.   - Provide timely, complete, and accurate information to patient/family  - Incorporate patient and family knowledge, values, beliefs, and cultural backgrounds into the planning and delivery of care  - Encourage patient/family to participate in care and decision-making at the level they choose  - Honor patient and family perspectives and choices  Outcome: Progressing     Problem: Diabetes/Glucose Control  Goal: Glucose maintained within prescribed range  Description: INTERVENTIONS:  - Monitor Blood Glucose as ordered  - Assess for signs and symptoms of hyperglycemia and hypoglycemia  - Administer ordered medications to maintain glucose within target range  - Assess barriers to adequate nutritional intake and initiate nutrition consult as needed  - Instruct patient on self management of diabetes  Outcome: Progressing     Problem: Patient/Family Goals  Goal: Patient/Family Long Term Goal  Description: Patient's Long Term Goal: SERINA-pt is unable to verbalize    Interventions:  - Follow MD orders  -Monitor vitals, labs  -Fall precautions  -Administer meds  - See additional Care Plan goals for specific interventions  Outcome: Progressing  Goal: Patient/Family Short Term Goal  Description: Patient's Short Term Goal: SERINA-pt is unable to verbalize  Interventions:   - Follow MD orders  -Monitor vitals, labs  -Fall precautions  -Administer meds    - See additional Care Plan goals for specific interventions  Outcome: Progressing     Problem: SKIN/TISSUE INTEGRITY - ADULT  Goal: Skin integrity remains intact  Description: INTERVENTIONS  - Assess and document risk factors for pressure ulcer development  - Assess and document skin integrity  - Monitor for areas of redness and/or skin breakdown  - Initiate interventions, skin care algorithm/standards of care as needed  Outcome: Progressing     Problem: RISK FOR INFECTION - ADULT  Goal: Absence of fever/infection during anticipated neutropenic period  Description: INTERVENTIONS  - Monitor WBC, temp, culture  - Administer antibiotic as ordered  - Implement neutropenic guidelines  Outcome: Progressing     Problem: SAFETY ADULT - FALL  Goal: Free from fall injury  Description: INTERVENTIONS:  - Assess pt frequently for physical needs  - Identify cognitive and physical deficits and behaviors that affect risk of falls.   - [place] fall precautions as indicated by assessment.  - Educate pt/family on patient safety including physical limitations  - Instruct pt to call for assistance with activity based on assessment  - Modify environment to reduce risk of injury  - Provide assistive devices as appropriate  - Consider OT/PT consult to assist with strengthening/mobility  - Encourage toileting schedule  Outcome: Progressing     Problem: DISCHARGE PLANNING  Goal: Discharge to home or other facility with appropriate resources  Description: INTERVENTIONS:  - Identify barriers to discharge w/pt and caregiver  - Include patient/family/discharge partner in discharge planning  - Arrange for needed discharge resources and transportation as appropriate  - Identify discharge learning needs (meds, wound care, etc)  - Arrange for interpreters to assist at discharge as needed  - Consider post-discharge preferences of patient/family/discharge partner  - Complete POLST form as appropriate  - Assess patient's ability to be responsible for managing their own health  - Refer to Case Management Department for coordinating discharge planning if the patient needs post-hospital services based on physician/LIP order or complex needs related to functional status, cognitive ability or social support system  Outcome: Progressing     Problem: Impaired Activities of Daily Living  Goal: Achieve highest/safest level of independence in self care  Description: Interventions:  - Assess ability and encourage patient to participate in ADLs to maximize function  - Promote sitting position while performing ADLs such as feeding, grooming, and bathing  - Educate and encourage patient/family in tolerated functional activity level and precautions during self-care. Outcome: Progressing   PT/OT/SLP to see pt, IVF discontinued, on rocephin for uti, incont-primofit in place, continue to monitor.

## 2022-12-04 LAB
ANION GAP SERPL CALC-SCNC: 7 MMOL/L (ref 0–18)
BASOPHILS # BLD AUTO: 0.04 X10(3) UL (ref 0–0.2)
BASOPHILS NFR BLD AUTO: 0.5 %
BUN BLD-MCNC: 45 MG/DL (ref 7–18)
BUN/CREAT SERPL: 17.1 (ref 10–20)
CALCIUM BLD-MCNC: 9.4 MG/DL (ref 8.5–10.1)
CHLORIDE SERPL-SCNC: 120 MMOL/L (ref 98–112)
CO2 SERPL-SCNC: 19 MMOL/L (ref 21–32)
CREAT BLD-MCNC: 2.63 MG/DL
DEPRECATED RDW RBC AUTO: 44.3 FL (ref 35.1–46.3)
EOSINOPHIL # BLD AUTO: 0.28 X10(3) UL (ref 0–0.7)
EOSINOPHIL NFR BLD AUTO: 3.2 %
ERYTHROCYTE [DISTWIDTH] IN BLOOD BY AUTOMATED COUNT: 13.1 % (ref 11–15)
GFR SERPLBLD BASED ON 1.73 SQ M-ARVRAT: 24 ML/MIN/1.73M2 (ref 60–?)
GLUCOSE BLD-MCNC: 108 MG/DL (ref 70–99)
GLUCOSE BLDC GLUCOMTR-MCNC: 104 MG/DL (ref 70–99)
GLUCOSE BLDC GLUCOMTR-MCNC: 131 MG/DL (ref 70–99)
GLUCOSE BLDC GLUCOMTR-MCNC: 142 MG/DL (ref 70–99)
GLUCOSE BLDC GLUCOMTR-MCNC: 83 MG/DL (ref 70–99)
HCT VFR BLD AUTO: 42.2 %
HGB BLD-MCNC: 14.3 G/DL
IMM GRANULOCYTES # BLD AUTO: 0.03 X10(3) UL (ref 0–1)
IMM GRANULOCYTES NFR BLD: 0.3 %
LYMPHOCYTES # BLD AUTO: 2.66 X10(3) UL (ref 1–4)
LYMPHOCYTES NFR BLD AUTO: 30.4 %
MAGNESIUM SERPL-MCNC: 2.2 MG/DL (ref 1.6–2.6)
MCH RBC QN AUTO: 31.8 PG (ref 26–34)
MCHC RBC AUTO-ENTMCNC: 33.9 G/DL (ref 31–37)
MCV RBC AUTO: 93.8 FL
MONOCYTES # BLD AUTO: 0.63 X10(3) UL (ref 0.1–1)
MONOCYTES NFR BLD AUTO: 7.2 %
NEUTROPHILS # BLD AUTO: 5.1 X10 (3) UL (ref 1.5–7.7)
NEUTROPHILS # BLD AUTO: 5.1 X10(3) UL (ref 1.5–7.7)
NEUTROPHILS NFR BLD AUTO: 58.4 %
OSMOLALITY SERPL CALC.SUM OF ELEC: 314 MOSM/KG (ref 275–295)
PLATELET # BLD AUTO: 168 10(3)UL (ref 150–450)
POTASSIUM SERPL-SCNC: 4.2 MMOL/L (ref 3.5–5.1)
RBC # BLD AUTO: 4.5 X10(6)UL
SODIUM SERPL-SCNC: 146 MMOL/L (ref 136–145)
WBC # BLD AUTO: 8.7 X10(3) UL (ref 4–11)

## 2022-12-04 PROCEDURE — 97530 THERAPEUTIC ACTIVITIES: CPT

## 2022-12-04 PROCEDURE — 97162 PT EVAL MOD COMPLEX 30 MIN: CPT

## 2022-12-04 PROCEDURE — 97110 THERAPEUTIC EXERCISES: CPT

## 2022-12-04 PROCEDURE — 82962 GLUCOSE BLOOD TEST: CPT

## 2022-12-04 PROCEDURE — 80048 BASIC METABOLIC PNL TOTAL CA: CPT | Performed by: HOSPITALIST

## 2022-12-04 PROCEDURE — 85025 COMPLETE CBC W/AUTO DIFF WBC: CPT | Performed by: HOSPITALIST

## 2022-12-04 PROCEDURE — 83735 ASSAY OF MAGNESIUM: CPT | Performed by: HOSPITALIST

## 2022-12-04 RX ORDER — DEXTROSE MONOHYDRATE 50 MG/ML
INJECTION, SOLUTION INTRAVENOUS CONTINUOUS
Status: ACTIVE | OUTPATIENT
Start: 2022-12-04 | End: 2022-12-04

## 2022-12-04 NOTE — PLAN OF CARE
No acute changes this shift. Safety precautions in place. Problem: Patient Centered Care  Goal: Patient preferences are identified and integrated in the patient's plan of care  Description: Interventions:  - What would you like us to know as we care for you?  From home   - Provide timely, complete, and accurate information to patient/family  - Incorporate patient and family knowledge, values, beliefs, and cultural backgrounds into the planning and delivery of care  - Encourage patient/family to participate in care and decision-making at the level they choose  - Honor patient and family perspectives and choices  Outcome: Progressing     Problem: Diabetes/Glucose Control  Goal: Glucose maintained within prescribed range  Description: INTERVENTIONS:  - Monitor Blood Glucose as ordered  - Assess for signs and symptoms of hyperglycemia and hypoglycemia  - Administer ordered medications to maintain glucose within target range  - Assess barriers to adequate nutritional intake and initiate nutrition consult as needed  - Instruct patient on self management of diabetes  Outcome: Progressing     Problem: Patient/Family Goals  Goal: Patient/Family Long Term Goal  Description: Patient's Long Term Goal: Go home    Interventions:  - pending medical course  - See additional Care Plan goals for specific interventions  Outcome: Progressing  Goal: Patient/Family Short Term Goal  Description: Patient's Short Term Goal: resolve infection    Interventions:   - IV abt  - See additional Care Plan goals for specific interventions  Outcome: Progressing     Problem: SKIN/TISSUE INTEGRITY - ADULT  Goal: Skin integrity remains intact  Description: INTERVENTIONS  - Assess and document risk factors for pressure ulcer development  - Assess and document skin integrity  - Monitor for areas of redness and/or skin breakdown  - Initiate interventions, skin care algorithm/standards of care as needed  Outcome: Progressing     Problem: Impaired Activities of Daily Living  Goal: Achieve highest/safest level of independence in self care  Description: Interventions:  - Assess ability and encourage patient to participate in ADLs to maximize function  - Promote sitting position while performing ADLs such as feeding, grooming, and bathing  - Educate and encourage patient/family in tolerated functional activity level and precautions during self-care  - Provide support under elbow of weak side to prevent shoulder subluxation  Outcome: Progressing     Problem: RISK FOR INFECTION - ADULT  Goal: Absence of fever/infection during anticipated neutropenic period  Description: INTERVENTIONS  - Monitor WBC  - Administer growth factors as ordered  - Implement neutropenic guidelines  Outcome: Progressing     Problem: SAFETY ADULT - FALL  Goal: Free from fall injury  Description: INTERVENTIONS:  - Assess pt frequently for physical needs  - Identify cognitive and physical deficits and behaviors that affect risk of falls.   - [place] fall precautions as indicated by assessment.  - Educate pt/family on patient safety including physical limitations  - Instruct pt to call for assistance with activity based on assessment  - Modify environment to reduce risk of injury  - Provide assistive devices as appropriate  - Consider OT/PT consult to assist with strengthening/mobility  - Encourage toileting schedule  Outcome: Progressing     Problem: DISCHARGE PLANNING  Goal: Discharge to home or other facility with appropriate resources  Description: INTERVENTIONS:  - Identify barriers to discharge w/pt and caregiver  - Include patient/family/discharge partner in discharge planning  - Arrange for needed discharge resources and transportation as appropriate  - Identify discharge learning needs (meds, wound care, etc)  - Arrange for interpreters to assist at discharge as needed  - Consider post-discharge preferences of patient/family/discharge partner  - Complete POLST form as appropriate  - Assess patient's ability to be responsible for managing their own health  - Refer to Case Management Department for coordinating discharge planning if the patient needs post-hospital services based on physician/LIP order or complex needs related to functional status, cognitive ability or social support system  Outcome: Progressing

## 2022-12-04 NOTE — CM/SW NOTE
PER MD Pt may need [facility] at discharge. CM uploaded [facility] SNF referral    PASRR level 1 screen completed and uploaded to aidin referral     Pt has hx w/[facility] and HH. PT rec-[facility] vs HH  OT rec =[facility]    [facility]. PLAN;  SNF  Vs  HH    CM/SW to remain available for support and/or discharge planning.     Vivian Barrera RN, [facility]    Ext.  [phone]

## 2022-12-04 NOTE — PLAN OF CARE
Monitoring vital signs & blood glucose per order, no acute changes. Pt received IV abx. SLT worked with pt this evening. Family at bedside much of day. Safety precautions in place, frequent rounding by nursing staff.      Problem: Patient Centered Care  Goal: Patient preferences are identified and integrated in the patient's plan of care  Description: Interventions:  - What would you like us to know as we care for you?   - Provide timely, complete, and accurate information to patient/family  - Incorporate patient and family knowledge, values, beliefs, and cultural backgrounds into the planning and delivery of care  - Encourage patient/family to participate in care and decision-making at the level they choose  - Honor patient and family perspectives and choices  Outcome: Progressing     Problem: Diabetes/Glucose Control  Goal: Glucose maintained within prescribed range  Description: INTERVENTIONS:  - Monitor Blood Glucose as ordered  - Assess for signs and symptoms of hyperglycemia and hypoglycemia  - Administer ordered medications to maintain glucose within target range  - Assess barriers to adequate nutritional intake and initiate nutrition consult as needed  - Instruct patient on self management of diabetes  Outcome: Progressing     Problem: Patient/Family Goals  Goal: Patient/Family Long Term Goal  Description: Patient's Long Term Goal:     Interventions:  -   - See additional Care Plan goals for specific interventions  Outcome: Progressing  Goal: Patient/Family Short Term Goal  Description: Patient's Short Term Goal:     Interventions:   -   - See additional Care Plan goals for specific interventions  Outcome: Progressing     Problem: SKIN/TISSUE INTEGRITY - ADULT  Goal: Skin integrity remains intact  Description: INTERVENTIONS  - Assess and document risk factors for pressure ulcer development  - Assess and document skin integrity  - Monitor for areas of redness and/or skin breakdown  - Initiate interventions, skin care algorithm/standards of care as needed  Outcome: Progressing     Problem: RISK FOR INFECTION - ADULT  Goal: Absence of fever/infection during anticipated neutropenic period  Description: INTERVENTIONS  - Monitor WBC  - Administer growth factors as ordered  - Implement neutropenic guidelines  Outcome: Progressing     Problem: SAFETY ADULT - FALL  Goal: Free from fall injury  Description: INTERVENTIONS:  - Assess pt frequently for physical needs  - Identify cognitive and physical deficits and behaviors that affect risk of falls.   - [place] fall precautions as indicated by assessment.  - Educate pt/family on patient safety including physical limitations  - Instruct pt to call for assistance with activity based on assessment  - Modify environment to reduce risk of injury  - Provide assistive devices as appropriate  - Consider OT/PT consult to assist with strengthening/mobility  - Encourage toileting schedule  Outcome: Progressing     Problem: DISCHARGE PLANNING  Goal: Discharge to home or other facility with appropriate resources  Description: INTERVENTIONS:  - Identify barriers to discharge w/pt and caregiver  - Include patient/family/discharge partner in discharge planning  - Arrange for needed discharge resources and transportation as appropriate  - Identify discharge learning needs (meds, wound care, etc)  - Arrange for interpreters to assist at discharge as needed  - Consider post-discharge preferences of patient/family/discharge partner  - Complete POLST form as appropriate  - Assess patient's ability to be responsible for managing their own health  - Refer to Case Management Department for coordinating discharge planning if the patient needs post-hospital services based on physician/LIP order or complex needs related to functional status, cognitive ability or social support system  Outcome: Progressing     Problem: Impaired Activities of Daily Living  Goal: Achieve highest/safest level of independence in self care  Description: Interventions:  - Assess ability and encourage patient to participate in ADLs to maximize function  - Promote sitting position while performing ADLs such as feeding, grooming, and bathing  - Educate and encourage patient/family in tolerated functional activity level and precautions during self-care    Outcome: Progressing

## 2022-12-05 LAB
ANION GAP SERPL CALC-SCNC: 8 MMOL/L (ref 0–18)
BASOPHILS # BLD AUTO: 0.07 X10(3) UL (ref 0–0.2)
BASOPHILS NFR BLD AUTO: 1 %
BUN BLD-MCNC: 44 MG/DL (ref 7–18)
BUN/CREAT SERPL: 18.7 (ref 10–20)
CALCIUM BLD-MCNC: 9 MG/DL (ref 8.5–10.1)
CHLORIDE SERPL-SCNC: 117 MMOL/L (ref 98–112)
CO2 SERPL-SCNC: 19 MMOL/L (ref 21–32)
CREAT BLD-MCNC: 2.35 MG/DL
DEPRECATED RDW RBC AUTO: 43.9 FL (ref 35.1–46.3)
EOSINOPHIL # BLD AUTO: 0.32 X10(3) UL (ref 0–0.7)
EOSINOPHIL NFR BLD AUTO: 4.6 %
ERYTHROCYTE [DISTWIDTH] IN BLOOD BY AUTOMATED COUNT: 13 % (ref 11–15)
GFR SERPLBLD BASED ON 1.73 SQ M-ARVRAT: 28 ML/MIN/1.73M2 (ref 60–?)
GLUCOSE BLD-MCNC: 98 MG/DL (ref 70–99)
GLUCOSE BLDC GLUCOMTR-MCNC: 101 MG/DL (ref 70–99)
GLUCOSE BLDC GLUCOMTR-MCNC: 101 MG/DL (ref 70–99)
GLUCOSE BLDC GLUCOMTR-MCNC: 109 MG/DL (ref 70–99)
GLUCOSE BLDC GLUCOMTR-MCNC: 84 MG/DL (ref 70–99)
HCT VFR BLD AUTO: 41.6 %
HGB BLD-MCNC: 14.2 G/DL
IMM GRANULOCYTES # BLD AUTO: 0.02 X10(3) UL (ref 0–1)
IMM GRANULOCYTES NFR BLD: 0.3 %
LYMPHOCYTES # BLD AUTO: 2.73 X10(3) UL (ref 1–4)
LYMPHOCYTES NFR BLD AUTO: 39.3 %
MAGNESIUM SERPL-MCNC: 2.1 MG/DL (ref 1.6–2.6)
MCH RBC QN AUTO: 31.8 PG (ref 26–34)
MCHC RBC AUTO-ENTMCNC: 34.1 G/DL (ref 31–37)
MCV RBC AUTO: 93.1 FL
MONOCYTES # BLD AUTO: 0.6 X10(3) UL (ref 0.1–1)
MONOCYTES NFR BLD AUTO: 8.6 %
NEUTROPHILS # BLD AUTO: 3.2 X10 (3) UL (ref 1.5–7.7)
NEUTROPHILS # BLD AUTO: 3.2 X10(3) UL (ref 1.5–7.7)
NEUTROPHILS NFR BLD AUTO: 46.2 %
OSMOLALITY SERPL CALC.SUM OF ELEC: 309 MOSM/KG (ref 275–295)
PLATELET # BLD AUTO: 172 10(3)UL (ref 150–450)
POTASSIUM SERPL-SCNC: 4.2 MMOL/L (ref 3.5–5.1)
RBC # BLD AUTO: 4.47 X10(6)UL
SODIUM SERPL-SCNC: 144 MMOL/L (ref 136–145)
WBC # BLD AUTO: 6.9 X10(3) UL (ref 4–11)

## 2022-12-05 PROCEDURE — 83735 ASSAY OF MAGNESIUM: CPT | Performed by: HOSPITALIST

## 2022-12-05 PROCEDURE — 80048 BASIC METABOLIC PNL TOTAL CA: CPT | Performed by: HOSPITALIST

## 2022-12-05 PROCEDURE — 85025 COMPLETE CBC W/AUTO DIFF WBC: CPT | Performed by: HOSPITALIST

## 2022-12-05 PROCEDURE — 82962 GLUCOSE BLOOD TEST: CPT

## 2022-12-05 NOTE — PLAN OF CARE
No acute changes this shift. Safety precautions in place. Problem: Patient Centered Care  Goal: Patient preferences are identified and integrated in the patient's plan of care  Description: Interventions:  - What would you like us to know as we care for you?  From home with wife  - Provide timely, complete, and accurate information to patient/family  - Incorporate patient and family knowledge, values, beliefs, and cultural backgrounds into the planning and delivery of care  - Encourage patient/family to participate in care and decision-making at the level they choose  - Honor patient and family perspectives and choices  Outcome: Progressing     Problem: Diabetes/Glucose Control  Goal: Glucose maintained within prescribed range  Description: INTERVENTIONS:  - Monitor Blood Glucose as ordered  - Assess for signs and symptoms of hyperglycemia and hypoglycemia  - Administer ordered medications to maintain glucose within target range  - Assess barriers to adequate nutritional intake and initiate nutrition consult as needed  - Instruct patient on self management of diabetes  Outcome: Progressing     Problem: Patient/Family Goals  Goal: Patient/Family Long Term Goal  Description: Patient's Long Term Goal: go home    Interventions:  - possible JUAN JOSÉ on discharge, , PT/OT eval  - See additional Care Plan goals for specific interventions  Outcome: Progressing  Goal: Patient/Family Short Term Goal  Description: Patient's Short Term Goal: resolve UTI    Interventions:   - IV abt  - See additional Care Plan goals for specific interventions  Outcome: Progressing     Problem: SKIN/TISSUE INTEGRITY - ADULT  Goal: Skin integrity remains intact  Description: INTERVENTIONS  - Assess and document risk factors for pressure ulcer development  - Assess and document skin integrity  - Monitor for areas of redness and/or skin breakdown  - Initiate interventions, skin care algorithm/standards of care as needed  Outcome: Progressing     Problem: Impaired Activities of Daily Living  Goal: Achieve highest/safest level of independence in self care  Description: Interventions:  - Assess ability and encourage patient to participate in ADLs to maximize function  - Promote sitting position while performing ADLs such as feeding, grooming, and bathing  - Educate and encourage patient/family in tolerated functional activity level and precautions during self-care  - Provide support under elbow of weak side to prevent shoulder subluxation  Outcome: Progressing     Problem: RISK FOR INFECTION - ADULT  Goal: Absence of fever/infection during anticipated neutropenic period  Description: INTERVENTIONS  - Monitor WBC  - Administer growth factors as ordered  - Implement neutropenic guidelines  Outcome: Progressing     Problem: SAFETY ADULT - FALL  Goal: Free from fall injury  Description: INTERVENTIONS:  - Assess pt frequently for physical needs  - Identify cognitive and physical deficits and behaviors that affect risk of falls.   - [place] fall precautions as indicated by assessment.  - Educate pt/family on patient safety including physical limitations  - Instruct pt to call for assistance with activity based on assessment  - Modify environment to reduce risk of injury  - Provide assistive devices as appropriate  - Consider OT/PT consult to assist with strengthening/mobility  - Encourage toileting schedule  Outcome: Progressing     Problem: DISCHARGE PLANNING  Goal: Discharge to home or other facility with appropriate resources  Description: INTERVENTIONS:  - Identify barriers to discharge w/pt and caregiver  - Include patient/family/discharge partner in discharge planning  - Arrange for needed discharge resources and transportation as appropriate  - Identify discharge learning needs (meds, wound care, etc)  - Arrange for interpreters to assist at discharge as needed  - Consider post-discharge preferences of patient/family/discharge partner  - Complete POLST form as appropriate  - Assess patient's ability to be responsible for managing their own health  - Refer to Case Management Department for coordinating discharge planning if the patient needs post-hospital services based on physician/LIP order or complex needs related to functional status, cognitive ability or social support system  Outcome: Progressing

## 2022-12-05 NOTE — HOME CARE LIAISON
This patient is current with [facility]. Patient will need a PIERRE order prior to DC. If new services are added it will need to be a F2F.

## 2022-12-05 NOTE — PLAN OF CARE
Family at bedside most of the day. Wife assisted with 1:1 feeding. Pt not urinating much today - encouraged increased PO liquids. No acute changes at this time. Safety and fall precautions maintained, bed/chair alarms in place. Call light within reach. Frequent rounding by nursing staff. Problem: Patient Centered Care  Goal: Patient preferences are identified and integrated in the patient's plan of care  Description: Interventions:  - What would you like us to know as we care for you? My family members are often here to see me.   - Provide timely, complete, and accurate information to patient/family  - Incorporate patient and family knowledge, values, beliefs, and cultural backgrounds into the planning and delivery of care  - Encourage patient/family to participate in care and decision-making at the level they choose  - Honor patient and family perspectives and choices  Outcome: Progressing     Problem: Diabetes/Glucose Control  Goal: Glucose maintained within prescribed range  Description: INTERVENTIONS:  - Monitor Blood Glucose as ordered  - Assess for signs and symptoms of hyperglycemia and hypoglycemia  - Administer ordered medications to maintain glucose within target range  - Assess barriers to adequate nutritional intake and initiate nutrition consult as needed  - Instruct patient on self management of diabetes  Outcome: Progressing     Problem: SKIN/TISSUE INTEGRITY - ADULT  Goal: Skin integrity remains intact  Description: INTERVENTIONS  - Assess and document risk factors for pressure ulcer development  - Assess and document skin integrity  - Monitor for areas of redness and/or skin breakdown  - Initiate interventions, skin care algorithm/standards of care as needed  Outcome: Progressing     Problem: RISK FOR INFECTION - ADULT  Goal: Absence of fever/infection during anticipated neutropenic period  Description: INTERVENTIONS  - Monitor WBC  - Administer growth factors as ordered  - Implement neutropenic guidelines  Outcome: Progressing     Problem: SAFETY ADULT - FALL  Goal: Free from fall injury  Description: INTERVENTIONS:  - Assess pt frequently for physical needs  - Identify cognitive and physical deficits and behaviors that affect risk of falls.   - [place] fall precautions as indicated by assessment.  - Educate pt/family on patient safety including physical limitations  - Instruct pt to call for assistance with activity based on assessment  - Modify environment to reduce risk of injury  - Provide assistive devices as appropriate  - Consider OT/PT consult to assist with strengthening/mobility  - Encourage toileting schedule  Outcome: Progressing     Problem: DISCHARGE PLANNING  Goal: Discharge to home or other facility with appropriate resources  Description: INTERVENTIONS:  - Identify barriers to discharge w/pt and caregiver  - Include patient/family/discharge partner in discharge planning  - Arrange for needed discharge resources and transportation as appropriate  - Identify discharge learning needs (meds, wound care, etc)  - Arrange for interpreters to assist at discharge as needed  - Consider post-discharge preferences of patient/family/discharge partner  - Complete POLST form as appropriate  - Assess patient's ability to be responsible for managing their own health  - Refer to Case Management Department for coordinating discharge planning if the patient needs post-hospital services based on physician/LIP order or complex needs related to functional status, cognitive ability or social support system  Outcome: Progressing     Problem: Impaired Activities of Daily Living  Goal: Achieve highest/safest level of independence in self care  Description: Interventions:  - Assess ability and encourage patient to participate in ADLs to maximize function  - Promote sitting position while performing ADLs such as feeding, grooming, and bathing  - Educate and encourage patient/family in tolerated functional activity level and precautions during self-care    Outcome: Progressing

## 2022-12-05 NOTE — CM/SW NOTE
Pt discussed in rounds. SW printed choice list, and presented it to wife. Wife wishes to reserved [facility]. BTE reserved via aidin. BTE made aware of possible discharge for tomorrow, pending bed avail. [facility] informed that they will have a bed avail tomorrow. Plan: Pending medical clearance, DC to [facility], bed available on 12/6    SW/ to remain available for support and/or discharge planning.      Joy Santso MSW, LSW   x [phone]

## 2022-12-05 NOTE — PLAN OF CARE
Plan of care reviewed with Mechelle James and his wife at bedside. D5 started. Patient's appetite improved and tolerating meals with assistance. Safety measures in place and call light within reach.

## 2022-12-06 VITALS
BODY MASS INDEX: 23.65 KG/M2 | HEART RATE: 79 BPM | TEMPERATURE: 98 F | RESPIRATION RATE: 18 BRPM | OXYGEN SATURATION: 100 % | SYSTOLIC BLOOD PRESSURE: 138 MMHG | WEIGHT: 159.69 LBS | HEIGHT: 69 IN | DIASTOLIC BLOOD PRESSURE: 76 MMHG

## 2022-12-06 LAB
GLUCOSE BLDC GLUCOMTR-MCNC: 104 MG/DL (ref 70–99)
GLUCOSE BLDC GLUCOMTR-MCNC: 80 MG/DL (ref 70–99)
SARS-COV-2 RNA RESP QL NAA+PROBE: NOT DETECTED

## 2022-12-06 PROCEDURE — 82962 GLUCOSE BLOOD TEST: CPT

## 2022-12-06 RX ORDER — CEPHALEXIN 500 MG/1
500 CAPSULE ORAL 2 TIMES DAILY
Qty: 20 CAPSULE | Refills: 0 | Status: SHIPPED | OUTPATIENT
Start: 2022-12-06 | End: 2022-12-16

## 2022-12-06 NOTE — PROGRESS NOTES
PT discharged, report given to RN at [facility]. Discharge paperwork and prescriptions reviewed, all questions and concerns addressed. IV access discontinued.

## 2022-12-06 NOTE — PLAN OF CARE
Patient has safety precautions in place bed in the lowest position, bed alarm on, and call light within reach. Continue to monitor patient with intentional nursing rounds. Patient needs encouragement to be reminded to void. Problem: Patient Centered Care  Goal: Patient preferences are identified and integrated in the patient's plan of care  Description: Interventions:  - What would you like us to know as we care for you? My family members are often here to see me.   - Provide timely, complete, and accurate information to patient/family  - Incorporate patient and family knowledge, values, beliefs, and cultural backgrounds into the planning and delivery of care  - Encourage patient/family to participate in care and decision-making at the level they choose  - Honor patient and family perspectives and choices  Outcome: Progressing     Problem: Diabetes/Glucose Control  Goal: Glucose maintained within prescribed range  Description: INTERVENTIONS:  - Monitor Blood Glucose as ordered  - Assess for signs and symptoms of hyperglycemia and hypoglycemia  - Administer ordered medications to maintain glucose within target range  - Assess barriers to adequate nutritional intake and initiate nutrition consult as needed  - Instruct patient on self management of diabetes  Outcome: Progressing     Problem: SKIN/TISSUE INTEGRITY - ADULT  Goal: Skin integrity remains intact  Description: INTERVENTIONS  - Assess and document risk factors for pressure ulcer development  - Assess and document skin integrity  - Monitor for areas of redness and/or skin breakdown  - Initiate interventions, skin care algorithm/standards of care as needed  Outcome: Progressing     Problem: RISK FOR INFECTION - ADULT  Goal: Absence of fever/infection during anticipated neutropenic period  Description: INTERVENTIONS  - Monitor WBC  - Administer growth factors as ordered  - Implement neutropenic guidelines  Outcome: Progressing     Problem: SAFETY ADULT - FALL  Goal: Free from fall injury  Description: INTERVENTIONS:  - Assess pt frequently for physical needs  - Identify cognitive and physical deficits and behaviors that affect risk of falls.   - [place] fall precautions as indicated by assessment.  - Educate pt/family on patient safety including physical limitations  - Instruct pt to call for assistance with activity based on assessment  - Modify environment to reduce risk of injury  - Provide assistive devices as appropriate  - Consider OT/PT consult to assist with strengthening/mobility  - Encourage toileting schedule  Outcome: Progressing     Problem: DISCHARGE PLANNING  Goal: Discharge to home or other facility with appropriate resources  Description: INTERVENTIONS:  - Identify barriers to discharge w/pt and caregiver  - Include patient/family/discharge partner in discharge planning  - Arrange for needed discharge resources and transportation as appropriate  - Identify discharge learning needs (meds, wound care, etc)  - Arrange for interpreters to assist at discharge as needed  - Consider post-discharge preferences of patient/family/discharge partner  - Complete POLST form as appropriate  - Assess patient's ability to be responsible for managing their own health  - Refer to Case Management Department for coordinating discharge planning if the patient needs post-hospital services based on physician/LIP order or complex needs related to functional status, cognitive ability or social support system  Outcome: Progressing     Problem: Impaired Activities of Daily Living  Goal: Achieve highest/safest level of independence in self care  Description: Interventions:  - Assess ability and encourage patient to participate in ADLs to maximize function  - Promote sitting position while performing ADLs such as feeding, grooming, and bathing  - Educate and encourage patient/family in tolerated functional activity level and precautions during self-care  - Encourage patient to incorporate impaired side during daily activities to promote function  Outcome: Progressing

## 2022-12-06 NOTE — DISCHARGE PLANNING
Patient chart reviewed for discharge: Medication Reconciliation completed, Specialist/PCP follow up listed, and disease specific Instructions/Education included in After Visit Summary. Discharge RN notified patient's RN of AVS completion and verified all consultants have signed off. Patient's RN to notify DC RN if discharge status changes. Jo Davila RN, Discharge Leader    This RN scheduled an ambulance for transport to [address]. [facility],  scheduled for 2pm. This RN called and notified wife Marquis Browne of discharge today. PCS form completed.

## 2023-03-20 ENCOUNTER — APPOINTMENT (OUTPATIENT)
Dept: CT IMAGING | Facility: HOSPITAL | Age: 78
DRG: 683 | End: 2023-03-20
Attending: STUDENT IN AN ORGANIZED HEALTH CARE EDUCATION/TRAINING PROGRAM
Payer: MEDICARE

## 2023-03-20 ENCOUNTER — APPOINTMENT (OUTPATIENT)
Dept: CT IMAGING | Facility: HOSPITAL | Age: 78
End: 2023-03-20
Attending: STUDENT IN AN ORGANIZED HEALTH CARE EDUCATION/TRAINING PROGRAM
Payer: MEDICARE

## 2023-03-20 ENCOUNTER — APPOINTMENT (OUTPATIENT)
Dept: ULTRASOUND IMAGING | Facility: HOSPITAL | Age: 78
End: 2023-03-20
Attending: STUDENT IN AN ORGANIZED HEALTH CARE EDUCATION/TRAINING PROGRAM
Payer: MEDICARE

## 2023-03-20 ENCOUNTER — APPOINTMENT (OUTPATIENT)
Dept: GENERAL RADIOLOGY | Facility: HOSPITAL | Age: 78
End: 2023-03-20
Attending: STUDENT IN AN ORGANIZED HEALTH CARE EDUCATION/TRAINING PROGRAM
Payer: MEDICARE

## 2023-03-20 ENCOUNTER — HOSPITAL ENCOUNTER (INPATIENT)
Facility: HOSPITAL | Age: 78
LOS: 3 days | Discharge: NURSING, REHAB OR LONG-TERM CARE | DRG: 683 | End: 2023-03-23
Attending: EMERGENCY MEDICINE | Admitting: STUDENT IN AN ORGANIZED HEALTH CARE EDUCATION/TRAINING PROGRAM
Payer: MEDICARE

## 2023-03-20 ENCOUNTER — APPOINTMENT (OUTPATIENT)
Dept: ULTRASOUND IMAGING | Facility: HOSPITAL | Age: 78
DRG: 683 | End: 2023-03-20
Attending: STUDENT IN AN ORGANIZED HEALTH CARE EDUCATION/TRAINING PROGRAM
Payer: MEDICARE

## 2023-03-20 ENCOUNTER — HOSPITAL ENCOUNTER (INPATIENT)
Facility: HOSPITAL | Age: 78
LOS: 3 days | Discharge: NURSING, REHAB OR LONG-TERM CARE | End: 2023-03-23
Attending: EMERGENCY MEDICINE | Admitting: STUDENT IN AN ORGANIZED HEALTH CARE EDUCATION/TRAINING PROGRAM
Payer: MEDICARE

## 2023-03-20 ENCOUNTER — APPOINTMENT (OUTPATIENT)
Dept: GENERAL RADIOLOGY | Facility: HOSPITAL | Age: 78
DRG: 683 | End: 2023-03-20
Attending: STUDENT IN AN ORGANIZED HEALTH CARE EDUCATION/TRAINING PROGRAM
Payer: MEDICARE

## 2023-03-20 DIAGNOSIS — N17.9 ACUTE RENAL FAILURE SUPERIMPOSED ON CHRONIC KIDNEY DISEASE, UNSPECIFIED CKD STAGE, UNSPECIFIED ACUTE RENAL FAILURE TYPE (HCC): Primary | ICD-10-CM

## 2023-03-20 DIAGNOSIS — R19.7 DIARRHEA OF PRESUMED INFECTIOUS ORIGIN: ICD-10-CM

## 2023-03-20 DIAGNOSIS — N18.9 ACUTE RENAL FAILURE SUPERIMPOSED ON CHRONIC KIDNEY DISEASE, UNSPECIFIED CKD STAGE, UNSPECIFIED ACUTE RENAL FAILURE TYPE (HCC): Primary | ICD-10-CM

## 2023-03-20 LAB
ALBUMIN SERPL-MCNC: 3 G/DL (ref 3.4–5)
ALBUMIN SERPL-MCNC: 3.3 G/DL (ref 3.4–5)
ALBUMIN/GLOB SERPL: 0.7 {RATIO} (ref 1–2)
ALBUMIN/GLOB SERPL: 0.8 {RATIO} (ref 1–2)
ALP LIVER SERPL-CCNC: 79 U/L
ALP LIVER SERPL-CCNC: 90 U/L
ALT SERPL-CCNC: 12 U/L
ALT SERPL-CCNC: 15 U/L
ANION GAP SERPL CALC-SCNC: 6 MMOL/L (ref 0–18)
ANION GAP SERPL CALC-SCNC: 9 MMOL/L (ref 0–18)
AST SERPL-CCNC: 10 U/L (ref 15–37)
AST SERPL-CCNC: 9 U/L (ref 15–37)
BASOPHILS # BLD AUTO: 0.06 X10(3) UL (ref 0–0.2)
BASOPHILS NFR BLD AUTO: 0.8 %
BILIRUB SERPL-MCNC: 0.3 MG/DL (ref 0.1–2)
BILIRUB SERPL-MCNC: 0.6 MG/DL (ref 0.1–2)
BILIRUB UR QL: NEGATIVE
BUN BLD-MCNC: 61 MG/DL (ref 7–18)
BUN BLD-MCNC: 62 MG/DL (ref 7–18)
BUN/CREAT SERPL: 14.9 (ref 10–20)
BUN/CREAT SERPL: 17.1 (ref 10–20)
CALCIUM BLD-MCNC: 9 MG/DL (ref 8.5–10.1)
CALCIUM BLD-MCNC: 9.8 MG/DL (ref 8.5–10.1)
CHLORIDE SERPL-SCNC: 108 MMOL/L (ref 98–112)
CHLORIDE SERPL-SCNC: 114 MMOL/L (ref 98–112)
CO2 SERPL-SCNC: 20 MMOL/L (ref 21–32)
CO2 SERPL-SCNC: 25 MMOL/L (ref 21–32)
COLOR UR: YELLOW
CREAT BLD-MCNC: 3.57 MG/DL
CREAT BLD-MCNC: 4.15 MG/DL
DEPRECATED RDW RBC AUTO: 42.7 FL (ref 35.1–46.3)
EOSINOPHIL # BLD AUTO: 0.31 X10(3) UL (ref 0–0.7)
EOSINOPHIL NFR BLD AUTO: 3.9 %
ERYTHROCYTE [DISTWIDTH] IN BLOOD BY AUTOMATED COUNT: 12.5 % (ref 11–15)
EST. AVERAGE GLUCOSE BLD GHB EST-MCNC: 148 MG/DL (ref 68–126)
GFR SERPLBLD BASED ON 1.73 SQ M-ARVRAT: 14 ML/MIN/1.73M2 (ref 60–?)
GFR SERPLBLD BASED ON 1.73 SQ M-ARVRAT: 17 ML/MIN/1.73M2 (ref 60–?)
GLOBULIN PLAS-MCNC: 3.8 G/DL (ref 2.8–4.4)
GLOBULIN PLAS-MCNC: 4.9 G/DL (ref 2.8–4.4)
GLUCOSE BLD-MCNC: 106 MG/DL (ref 70–99)
GLUCOSE BLD-MCNC: 112 MG/DL (ref 70–99)
GLUCOSE BLDC GLUCOMTR-MCNC: 103 MG/DL (ref 70–99)
GLUCOSE BLDC GLUCOMTR-MCNC: 148 MG/DL (ref 70–99)
GLUCOSE BLDC GLUCOMTR-MCNC: 78 MG/DL (ref 70–99)
GLUCOSE UR-MCNC: 50 MG/DL
HBA1C MFR BLD: 6.8 % (ref ?–5.7)
HCT VFR BLD AUTO: 42 %
HGB BLD-MCNC: 14.4 G/DL
IMM GRANULOCYTES # BLD AUTO: 0.05 X10(3) UL (ref 0–1)
IMM GRANULOCYTES NFR BLD: 0.6 %
KETONES UR-MCNC: NEGATIVE MG/DL
LYMPHOCYTES # BLD AUTO: 3.53 X10(3) UL (ref 1–4)
LYMPHOCYTES NFR BLD AUTO: 44.9 %
MCH RBC QN AUTO: 31.7 PG (ref 26–34)
MCHC RBC AUTO-ENTMCNC: 34.3 G/DL (ref 31–37)
MCV RBC AUTO: 92.5 FL
MONOCYTES # BLD AUTO: 0.62 X10(3) UL (ref 0.1–1)
MONOCYTES NFR BLD AUTO: 7.9 %
NEUTROPHILS # BLD AUTO: 3.29 X10 (3) UL (ref 1.5–7.7)
NEUTROPHILS # BLD AUTO: 3.29 X10(3) UL (ref 1.5–7.7)
NEUTROPHILS NFR BLD AUTO: 41.9 %
NITRITE UR QL STRIP.AUTO: NEGATIVE
OSMOLALITY SERPL CALC.SUM OF ELEC: 306 MOSM/KG (ref 275–295)
OSMOLALITY SERPL CALC.SUM OF ELEC: 314 MOSM/KG (ref 275–295)
PH UR: 5 [PH] (ref 5–8)
PLATELET # BLD AUTO: 259 10(3)UL (ref 150–450)
POTASSIUM SERPL-SCNC: 3.8 MMOL/L (ref 3.5–5.1)
POTASSIUM SERPL-SCNC: 3.9 MMOL/L (ref 3.5–5.1)
PROT SERPL-MCNC: 6.8 G/DL (ref 6.4–8.2)
PROT SERPL-MCNC: 8.2 G/DL (ref 6.4–8.2)
PROT UR-MCNC: 100 MG/DL
RBC # BLD AUTO: 4.54 X10(6)UL
RBC #/AREA URNS AUTO: >10 /HPF
SARS-COV-2 RNA RESP QL NAA+PROBE: NOT DETECTED
SODIUM SERPL-SCNC: 139 MMOL/L (ref 136–145)
SODIUM SERPL-SCNC: 143 MMOL/L (ref 136–145)
SP GR UR STRIP: 1.01 (ref 1–1.03)
UROBILINOGEN UR STRIP-ACNC: <2
VIT C UR-MCNC: NEGATIVE MG/DL
WBC # BLD AUTO: 7.9 X10(3) UL (ref 4–11)
WBC #/AREA URNS AUTO: >50 /HPF
WBC CLUMPS UR QL AUTO: PRESENT /HPF

## 2023-03-20 PROCEDURE — 76775 US EXAM ABDO BACK WALL LIM: CPT | Performed by: STUDENT IN AN ORGANIZED HEALTH CARE EDUCATION/TRAINING PROGRAM

## 2023-03-20 PROCEDURE — 71045 X-RAY EXAM CHEST 1 VIEW: CPT | Performed by: STUDENT IN AN ORGANIZED HEALTH CARE EDUCATION/TRAINING PROGRAM

## 2023-03-20 PROCEDURE — 70450 CT HEAD/BRAIN W/O DYE: CPT | Performed by: STUDENT IN AN ORGANIZED HEALTH CARE EDUCATION/TRAINING PROGRAM

## 2023-03-20 RX ORDER — BISACODYL 10 MG
10 SUPPOSITORY, RECTAL RECTAL
Status: DISCONTINUED | OUTPATIENT
Start: 2023-03-20 | End: 2023-03-23

## 2023-03-20 RX ORDER — POLYETHYLENE GLYCOL 3350 17 G/17G
17 POWDER, FOR SOLUTION ORAL DAILY PRN
Status: DISCONTINUED | OUTPATIENT
Start: 2023-03-20 | End: 2023-03-23

## 2023-03-20 RX ORDER — ATORVASTATIN CALCIUM 20 MG/1
20 TABLET, FILM COATED ORAL NIGHTLY
Status: DISCONTINUED | OUTPATIENT
Start: 2023-03-20 | End: 2023-03-23

## 2023-03-20 RX ORDER — ASPIRIN 81 MG/1
81 TABLET ORAL DAILY
Status: DISCONTINUED | OUTPATIENT
Start: 2023-03-20 | End: 2023-03-23

## 2023-03-20 RX ORDER — SODIUM CHLORIDE 9 MG/ML
INJECTION, SOLUTION INTRAVENOUS CONTINUOUS
Status: ACTIVE | OUTPATIENT
Start: 2023-03-20 | End: 2023-03-20

## 2023-03-20 RX ORDER — ACETAMINOPHEN 500 MG
500 TABLET ORAL EVERY 4 HOURS PRN
Status: DISCONTINUED | OUTPATIENT
Start: 2023-03-20 | End: 2023-03-23

## 2023-03-20 RX ORDER — ONDANSETRON 2 MG/ML
4 INJECTION INTRAMUSCULAR; INTRAVENOUS EVERY 6 HOURS PRN
Status: DISCONTINUED | OUTPATIENT
Start: 2023-03-20 | End: 2023-03-23

## 2023-03-20 RX ORDER — METOCLOPRAMIDE HYDROCHLORIDE 5 MG/ML
5 INJECTION INTRAMUSCULAR; INTRAVENOUS EVERY 8 HOURS PRN
Status: DISCONTINUED | OUTPATIENT
Start: 2023-03-20 | End: 2023-03-23

## 2023-03-20 RX ORDER — SENNOSIDES 8.6 MG
17.2 TABLET ORAL NIGHTLY PRN
Status: DISCONTINUED | OUTPATIENT
Start: 2023-03-20 | End: 2023-03-23

## 2023-03-20 RX ORDER — ALBUTEROL SULFATE 90 UG/1
2 AEROSOL, METERED RESPIRATORY (INHALATION) EVERY 6 HOURS PRN
Status: DISCONTINUED | OUTPATIENT
Start: 2023-03-20 | End: 2023-03-23

## 2023-03-20 RX ORDER — SODIUM CHLORIDE 9 MG/ML
INJECTION, SOLUTION INTRAVENOUS CONTINUOUS
Status: DISCONTINUED | OUTPATIENT
Start: 2023-03-20 | End: 2023-03-22

## 2023-03-20 RX ORDER — HEPARIN SODIUM 5000 [USP'U]/ML
5000 INJECTION, SOLUTION INTRAVENOUS; SUBCUTANEOUS EVERY 8 HOURS SCHEDULED
Status: CANCELLED | OUTPATIENT
Start: 2023-03-20

## 2023-03-20 RX ORDER — HYDRALAZINE HYDROCHLORIDE 20 MG/ML
10 INJECTION INTRAMUSCULAR; INTRAVENOUS EVERY 6 HOURS PRN
Status: DISCONTINUED | OUTPATIENT
Start: 2023-03-20 | End: 2023-03-23

## 2023-03-20 RX ORDER — MELATONIN
3 NIGHTLY PRN
Status: DISCONTINUED | OUTPATIENT
Start: 2023-03-20 | End: 2023-03-23

## 2023-03-20 NOTE — ED INITIAL ASSESSMENT (HPI)
Arrives from home, per report states \"weak, falling asleep more, confused\". x1 week diarrhea, patient denies abdominal pain or nausea, states he is here for \"nothing\".   Afebrile

## 2023-03-20 NOTE — ED QUICK NOTES
Per spouse at bedside, patient is normally conversational and walking around the home. Reports recent diarrhea and fevers, \"sleeping all the time\", denies falls at home.   Denies vomiting

## 2023-03-20 NOTE — ED QUICK NOTES
Orders for admission, patient is aware of plan and ready to go upstairs. Any questions, please call ED RN Sherin at extension [phone].      Patient Covid vaccination status: Fully vaccinated     COVID Test Ordered in ED: Rapid SARS-CoV-2 by PCR    COVID Suspicion at Admission: Low clinical suspicion for COVID    Running Infusions:      Mental Status/LOC at time of transport: AOx1    Other pertinent information:   CIWA score: N/A   NIH score:  N/A

## 2023-03-20 NOTE — PLAN OF CARE
Pt alert x1, BP elevated -MD notified and hydralyzine PRN added. IVF started. CTH and EEG to be done. Wife at bedside. Problem: Patient Centered Care  Goal: Patient preferences are identified and integrated in the patient's plan of care  Description: Interventions:  - What would you like us to know as we care for you?  Home w/ wife and HH  - Provide timely, complete, and accurate information to patient/family  - Incorporate patient and family knowledge, values, beliefs, and cultural backgrounds into the planning and delivery of care  - Encourage patient/family to participate in care and decision-making at the level they choose  - Honor patient and family perspectives and choices  Outcome: Progressing

## 2023-03-21 LAB
ANION GAP SERPL CALC-SCNC: 10 MMOL/L (ref 0–18)
BUN BLD-MCNC: 52 MG/DL (ref 7–18)
BUN/CREAT SERPL: 17.7 (ref 10–20)
CALCIUM BLD-MCNC: 9.3 MG/DL (ref 8.5–10.1)
CHLORIDE SERPL-SCNC: 114 MMOL/L (ref 98–112)
CO2 SERPL-SCNC: 21 MMOL/L (ref 21–32)
CREAT BLD-MCNC: 2.94 MG/DL
DEPRECATED RDW RBC AUTO: 42.2 FL (ref 35.1–46.3)
ERYTHROCYTE [DISTWIDTH] IN BLOOD BY AUTOMATED COUNT: 12.1 % (ref 11–15)
GFR SERPLBLD BASED ON 1.73 SQ M-ARVRAT: 21 ML/MIN/1.73M2 (ref 60–?)
GLUCOSE BLD-MCNC: 127 MG/DL (ref 70–99)
GLUCOSE BLDC GLUCOMTR-MCNC: 109 MG/DL (ref 70–99)
GLUCOSE BLDC GLUCOMTR-MCNC: 126 MG/DL (ref 70–99)
GLUCOSE BLDC GLUCOMTR-MCNC: 130 MG/DL (ref 70–99)
GLUCOSE BLDC GLUCOMTR-MCNC: 162 MG/DL (ref 70–99)
HCT VFR BLD AUTO: 44 %
HGB BLD-MCNC: 14.7 G/DL
MCH RBC QN AUTO: 31.2 PG (ref 26–34)
MCHC RBC AUTO-ENTMCNC: 33.4 G/DL (ref 31–37)
MCV RBC AUTO: 93.4 FL
OSMOLALITY SERPL CALC.SUM OF ELEC: 316 MOSM/KG (ref 275–295)
PLATELET # BLD AUTO: 225 10(3)UL (ref 150–450)
POTASSIUM SERPL-SCNC: 3.9 MMOL/L (ref 3.5–5.1)
RBC # BLD AUTO: 4.71 X10(6)UL
SODIUM SERPL-SCNC: 145 MMOL/L (ref 136–145)
WBC # BLD AUTO: 9.2 X10(3) UL (ref 4–11)

## 2023-03-21 PROCEDURE — 95816 EEG AWAKE AND DROWSY: CPT | Performed by: OTHER

## 2023-03-21 RX ORDER — MELATONIN
3 NIGHTLY
Status: DISCONTINUED | OUTPATIENT
Start: 2023-03-21 | End: 2023-03-23

## 2023-03-21 NOTE — PLAN OF CARE
IV fluids and rocephin continued. Appetite fairly good when fed. Weakness still present. Stool for C Diff and GI panel still needed. Problem: Patient Centered Care  Goal: Patient preferences are identified and integrated in the patient's plan of care  Description: Interventions:  - What would you like us to know as we care for you?  From home with wife  - Provide timely, complete, and accurate information to patient/family  - Incorporate patient and family knowledge, values, beliefs, and cultural backgrounds into the planning and delivery of care  - Encourage patient/family to participate in care and decision-making at the level they choose  - Honor patient and family perspectives and choices  Outcome: Progressing     Problem: METABOLIC/FLUID AND ELECTROLYTES - ADULT  Goal: Electrolytes maintained within normal limits  Description: INTERVENTIONS:  - Monitor labs and rhythm and assess patient for signs and symptoms of electrolyte imbalances  - Administer electrolyte replacement as ordered  - Monitor response to electrolyte replacements, including rhythm and repeat lab results as appropriate  - Fluid restriction as ordered  - Instruct patient on fluid and nutrition restrictions as appropriate  Outcome: Progressing  Goal: Hemodynamic stability and optimal renal function maintained  Description: INTERVENTIONS:  - Monitor labs and assess for signs and symptoms of volume excess or deficit  - Monitor intake, output and patient weight  - Monitor urine specific gravity, serum osmolarity and serum sodium as indicated or ordered  - Monitor response to interventions for patient's volume status, including labs, urine output, blood pressure (other measures as available)  - Encourage oral intake as appropriate  - Instruct patient on fluid and nutrition restrictions as appropriate  Outcome: Progressing     Problem: Impaired Cognition  Goal: Patient will exhibit improved attention, thought processing and/or memory  Description: Interventions:    Outcome: Progressing     Problem: SAFETY ADULT - FALL  Goal: Free from fall injury  Description: INTERVENTIONS:  - Assess pt frequently for physical needs  - Identify cognitive and physical deficits and behaviors that affect risk of falls.   - [place] fall precautions as indicated by assessment.  - Educate pt/family on patient safety including physical limitations  - Instruct pt to call for assistance with activity based on assessment  - Modify environment to reduce risk of injury  - Provide assistive devices as appropriate  - Consider OT/PT consult to assist with strengthening/mobility  - Encourage toileting schedule  Outcome: Progressing     Problem: Patient/Family Goals  Goal: Patient/Family Short Term Goal  Description: Patient's Short Term Goal: To feel better    Interventions:   - Monitor vital signs and labs  - Monitor blood glucose levels  - Pain management as needed  - Diagnostics per order  - Follow MD orders  - Administer medications as ordered  - Assists with activities of daily living   - Update patient and family on plan of care  - Discharge planning  - See additional Care Plan goals for specific interventions  Outcome: Progressing     Problem: MUSCULOSKELETAL - ADULT  Goal: Return mobility to safest level of function  Description: INTERVENTIONS:  - Assess patient stability and activity tolerance for standing, transferring and ambulating w/ or w/o assistive devices  - Assist with transfers and ambulation using safe patient handling equipment as needed  - Ensure adequate protection for wounds/incisions during mobilization  - Obtain PT/OT consults as needed  - Advance activity as appropriate  - Communicate ordered activity level and limitations with patient/family  Outcome: Not Progressing     Problem: Impaired Functional Mobility  Goal: Achieve highest/safest level of mobility/gait  Description: Interventions:  - Assess patient's functional ability and stability  - Promote increasing activity/tolerance for mobility and gait  - Educate and engage patient/family in tolerated activity level and precautions    Outcome: Not Progressing     Problem: Impaired Activities of Daily Living  Goal: Achieve highest/safest level of independence in self care  Description: Interventions:  - Assess ability and encourage patient to participate in ADLs to maximize function  - Promote sitting position while performing ADLs such as feeding, grooming, and bathing  - Educate and encourage patient/family in tolerated functional activity level and precautions during self-care    Outcome: Not Progressing     Problem: Patient/Family Goals  Goal: Patient/Family Long Term Goal  Description: Patient's Long Term Goal: To go home    Interventions:  - Monitor vital signs and labs  - Diagnostics per order  - Follow MD orders  - Administer medications as ordered  - Update patient and family on plan of care  - Discharge planning  - See additional Care Plan goals for specific interventions  Outcome: Not Progressing

## 2023-03-21 NOTE — PLAN OF CARE
Problem: Patient Centered Care  Goal: Patient preferences are identified and integrated in the patient's plan of care  Description: Interventions:  - What would you like us to know as we care for you?  From home with wife  - Provide timely, complete, and accurate information to patient/family  - Incorporate patient and family knowledge, values, beliefs, and cultural backgrounds into the planning and delivery of care  - Encourage patient/family to participate in care and decision-making at the level they choose  - Honor patient and family perspectives and choices  Outcome: Progressing     Problem: METABOLIC/FLUID AND ELECTROLYTES - ADULT  Goal: Electrolytes maintained within normal limits  Description: INTERVENTIONS:  - Monitor labs and rhythm and assess patient for signs and symptoms of electrolyte imbalances  - Administer electrolyte replacement as ordered  - Monitor response to electrolyte replacements, including rhythm and repeat lab results as appropriate  - Fluid restriction as ordered  - Instruct patient on fluid and nutrition restrictions as appropriate  Outcome: Progressing  Goal: Hemodynamic stability and optimal renal function maintained  Description: INTERVENTIONS:  - Monitor labs and assess for signs and symptoms of volume excess or deficit  - Monitor intake, output and patient weight  - Monitor urine specific gravity, serum osmolarity and serum sodium as indicated or ordered  - Monitor response to interventions for patient's volume status, including labs, urine output, blood pressure (other measures as available)  - Encourage oral intake as appropriate  - Instruct patient on fluid and nutrition restrictions as appropriate  Outcome: Progressing     Problem: MUSCULOSKELETAL - ADULT  Goal: Return mobility to safest level of function  Description: INTERVENTIONS:  - Assess patient stability and activity tolerance for standing, transferring and ambulating w/ or w/o assistive devices  - Assist with transfers and ambulation using safe patient handling equipment as needed  - Ensure adequate protection for wounds/incisions during mobilization  - Obtain PT/OT consults as needed  - Advance activity as appropriate  - Communicate ordered activity level and limitations with patient/family  Outcome: Progressing     Problem: Impaired Functional Mobility  Goal: Achieve highest/safest level of mobility/gait  Description: Interventions:  - Assess patient's functional ability and stability  - Promote increasing activity/tolerance for mobility and gait  - Educate and engage patient/family in tolerated activity level and precautions  Outcome: Progressing     Problem: Impaired Activities of Daily Living  Goal: Achieve highest/safest level of independence in self care  Description: Interventions:  - Assess ability and encourage patient to participate in ADLs to maximize function  - Promote sitting position while performing ADLs such as feeding, grooming, and bathing  - Educate and encourage patient/family in tolerated functional activity level and precautions during self-care  Outcome: Progressing     Problem: Impaired Cognition  Goal: Patient will exhibit improved attention, thought processing and/or memory  Description: Interventions:  - Minimize distractions in the room when full attention is required  - Allow additional time for processing after asking questions or providing instructions  Outcome: Progressing     Problem: SAFETY ADULT - FALL  Goal: Free from fall injury  Description: INTERVENTIONS:  - Assess pt frequently for physical needs  - Identify cognitive and physical deficits and behaviors that affect risk of falls.   - [place] fall precautions as indicated by assessment.  - Educate pt/family on patient safety including physical limitations  - Instruct pt to call for assistance with activity based on assessment  - Modify environment to reduce risk of injury  - Provide assistive devices as appropriate  - Consider OT/PT consult to assist with strengthening/mobility  - Encourage toileting schedule  Outcome: Progressing     Problem: Patient/Family Goals  Goal: Patient/Family Long Term Goal  Description: Patient's Long Term Goal: To go home    Interventions:  - Monitor vital signs and labs  - Diagnostics per order  - Follow MD orders  - Administer medications as ordered  - Update patient and family on plan of care  - Discharge planning  - See additional Care Plan goals for specific interventions  Outcome: Progressing  Goal: Patient/Family Short Term Goal  Description: Patient's Short Term Goal: To feel better    Interventions:   - Monitor vital signs and labs  - Monitor blood glucose levels  - Pain management as needed  - Diagnostics per order  - Follow MD orders  - Administer medications as ordered  - Assists with activities of daily living   - Update patient and family on plan of care  - Discharge planning  - See additional Care Plan goals for specific interventions  Outcome: Progressing     Vital signs stable at this time. Monitoring blood glucose levels. No acute changes noted at this moment. Fall precautions in place- bed alarm on and locked in lowest position. Call light within reach. Frequent rounding by nursing staff.

## 2023-03-21 NOTE — CM/SW NOTE
03/21/23 1600   CM/SW Referral Data   Referral Source Social Work (self-referral)   Reason for Referral Discharge planning   Informant Spouse/Significant Other   Medical Hx   Does patient have an established PCP? Yes   Patient Info   Patient's Current Mental Status at Time of Assessment Confused or unable to complete assessment   Patient's Home Environment Condo/Apt no elevator   Number of Levels in Home 1   Number of Stair in Home 20   Patient lives with Spouse/Significant other   Patient Status Prior to Admission   Independent with ADLs and Mobility No   Pt. requires assistance with Driving;Housework; Bathing;Dressing;Medications   Services in place prior to admission Home Health Care;DME/Supplies at home   [address] Provider Info [place] [address]   Type of DME/Supplies [facility]; Wheelchair;Commode;Straight Cane;Hospital Bed   Discharge Needs   Anticipated D/C needs To be determined     SW spoke with the pt's wife to confirm the above. The pt. Is home with his wife in a 2 flat apartment on the 2nd floor. The pt. Has DME at home and per his wife he has been able to walk short distances but not very well. The pt. Is current with [place] [facility] [address] for RN services only. New face to face has been entered to add PT. The pt. Has a hx. Of rehab at [facility] and the wife was pleased with his care. The pt's wife is hoping the pt. Will be able to return home when medically stable.       SW/CM will continue to follow and assist.      Plan:  Home w/[facility] when medically cleared pending PT/OT     Eloy Hernandez, [facility]

## 2023-03-21 NOTE — CM/SW NOTE
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 PT/OT evals will be needed for recommendations. [facility] referrals are pending at this time. Plan: pending surgery and PT/OT recommendations.       SW/CM will continue to follow and assist.     Latrice Finley [facility] ext [phone]

## 2023-03-21 NOTE — CM/SW NOTE
03/21/23 1600   CM/SW Referral Data   Referral Source Social Work (self-referral)   Reason for Referral Discharge planning   Informant Spouse/Significant Other   Medical Hx   Does patient have an established PCP? Yes   Patient Info   Patient's Current Mental Status at Time of Assessment Confused or unable to complete assessment   Patient's Home Environment Condo/Apt no elevator   Number of Levels in Home 1   Number of Stair in Home 20   Patient lives with Spouse/Significant other   Patient Status Prior to Admission   Independent with ADLs and Mobility No   Pt. requires assistance with Driving;Housework; Bathing;Dressing;Medications   Services in place prior to admission [address] Provider Info [facility]   Discharge Needs   Anticipated D/C needs To be determined

## 2023-03-22 LAB
ANION GAP SERPL CALC-SCNC: 9 MMOL/L (ref 0–18)
BUN BLD-MCNC: 39 MG/DL (ref 7–18)
BUN/CREAT SERPL: 19.5 (ref 10–20)
CALCIUM BLD-MCNC: 9.2 MG/DL (ref 8.5–10.1)
CHLORIDE SERPL-SCNC: 115 MMOL/L (ref 98–112)
CO2 SERPL-SCNC: 18 MMOL/L (ref 21–32)
CREAT BLD-MCNC: 2 MG/DL
DEPRECATED RDW RBC AUTO: 42 FL (ref 35.1–46.3)
ERYTHROCYTE [DISTWIDTH] IN BLOOD BY AUTOMATED COUNT: 12.3 % (ref 11–15)
GFR SERPLBLD BASED ON 1.73 SQ M-ARVRAT: 34 ML/MIN/1.73M2 (ref 60–?)
GLUCOSE BLD-MCNC: 102 MG/DL (ref 70–99)
GLUCOSE BLDC GLUCOMTR-MCNC: 144 MG/DL (ref 70–99)
GLUCOSE BLDC GLUCOMTR-MCNC: 146 MG/DL (ref 70–99)
GLUCOSE BLDC GLUCOMTR-MCNC: 160 MG/DL (ref 70–99)
GLUCOSE BLDC GLUCOMTR-MCNC: 93 MG/DL (ref 70–99)
HCT VFR BLD AUTO: 41.5 %
HGB BLD-MCNC: 14 G/DL
MCH RBC QN AUTO: 31.1 PG (ref 26–34)
MCHC RBC AUTO-ENTMCNC: 33.7 G/DL (ref 31–37)
MCV RBC AUTO: 92.2 FL
OSMOLALITY SERPL CALC.SUM OF ELEC: 304 MOSM/KG (ref 275–295)
PLATELET # BLD AUTO: 234 10(3)UL (ref 150–450)
POTASSIUM SERPL-SCNC: 3.9 MMOL/L (ref 3.5–5.1)
RBC # BLD AUTO: 4.5 X10(6)UL
SODIUM SERPL-SCNC: 142 MMOL/L (ref 136–145)
WBC # BLD AUTO: 8.8 X10(3) UL (ref 4–11)

## 2023-03-22 RX ORDER — SODIUM CHLORIDE 450 MG/100ML
INJECTION, SOLUTION INTRAVENOUS CONTINUOUS
Status: DISCONTINUED | OUTPATIENT
Start: 2023-03-22 | End: 2023-03-23

## 2023-03-22 RX ORDER — AMLODIPINE BESYLATE 5 MG/1
5 TABLET ORAL DAILY
Status: DISCONTINUED | OUTPATIENT
Start: 2023-03-22 | End: 2023-03-23

## 2023-03-22 RX ORDER — AMLODIPINE BESYLATE 10 MG/1
10 TABLET ORAL DAILY
Status: DISCONTINUED | OUTPATIENT
Start: 2023-03-22 | End: 2023-03-22

## 2023-03-22 NOTE — PLAN OF CARE
Problem: Patient Centered Care  Goal: Patient preferences are identified and integrated in the patient's plan of care  Description: Interventions:  - What would you like us to know as we care for you?  From home with wife  - Provide timely, complete, and accurate information to patient/family  - Incorporate patient and family knowledge, values, beliefs, and cultural backgrounds into the planning and delivery of care  - Encourage patient/family to participate in care and decision-making at the level they choose  - Honor patient and family perspectives and choices  Outcome: Progressing     Problem: METABOLIC/FLUID AND ELECTROLYTES - ADULT  Goal: Electrolytes maintained within normal limits  Description: INTERVENTIONS:  - Monitor labs and rhythm and assess patient for signs and symptoms of electrolyte imbalances  - Administer electrolyte replacement as ordered  - Monitor response to electrolyte replacements, including rhythm and repeat lab results as appropriate  - Fluid restriction as ordered  - Instruct patient on fluid and nutrition restrictions as appropriate  Outcome: Progressing  Goal: Hemodynamic stability and optimal renal function maintained  Description: INTERVENTIONS:  - Monitor labs and assess for signs and symptoms of volume excess or deficit  - Monitor intake, output and patient weight  - Monitor urine specific gravity, serum osmolarity and serum sodium as indicated or ordered  - Monitor response to interventions for patient's volume status, including labs, urine output, blood pressure (other measures as available)  - Encourage oral intake as appropriate  - Instruct patient on fluid and nutrition restrictions as appropriate  Outcome: Progressing     Problem: MUSCULOSKELETAL - ADULT  Goal: Return mobility to safest level of function  Description: INTERVENTIONS:  - Assess patient stability and activity tolerance for standing, transferring and ambulating w/ or w/o assistive devices  - Assist with transfers and ambulation using safe patient handling equipment as needed  - Ensure adequate protection for wounds/incisions during mobilization  - Obtain PT/OT consults as needed  - Advance activity as appropriate  - Communicate ordered activity level and limitations with patient/family  Outcome: Progressing     Problem: Impaired Functional Mobility  Goal: Achieve highest/safest level of mobility/gait  Description: Interventions:  - Assess patient's functional ability and stability  - Promote increasing activity/tolerance for mobility and gait  - Educate and engage patient/family in tolerated activity level and precautions  Outcome: Progressing     Problem: Impaired Activities of Daily Living  Goal: Achieve highest/safest level of independence in self care  Description: Interventions:  - Assess ability and encourage patient to participate in ADLs to maximize function  - Promote sitting position while performing ADLs such as feeding, grooming, and bathing  - Educate and encourage patient/family in tolerated functional activity level and precautions during self-care  Outcome: Progressing     Problem: Impaired Cognition  Goal: Patient will exhibit improved attention, thought processing and/or memory  Description: Interventions:  - Minimize distractions in the room when full attention is required  - Allow additional time for processing after asking questions or providing instructions  Outcome: Progressing     Problem: SAFETY ADULT - FALL  Goal: Free from fall injury  Description: INTERVENTIONS:  - Assess pt frequently for physical needs  - Identify cognitive and physical deficits and behaviors that affect risk of falls.   - [place] fall precautions as indicated by assessment.  - Educate pt/family on patient safety including physical limitations  - Instruct pt to call for assistance with activity based on assessment  - Modify environment to reduce risk of injury  - Provide assistive devices as appropriate  - Consider OT/PT consult to assist with strengthening/mobility  - Encourage toileting schedule  Outcome: Progressing     Problem: Patient/Family Goals  Goal: Patient/Family Long Term Goal  Description: Patient's Long Term Goal: To go home    Interventions:  - Monitor vital signs and labs  - Diagnostics per order  - Follow MD orders  - Administer medications as ordered  - Update patient and family on plan of care  - Discharge planning  - See additional Care Plan goals for specific interventions  Outcome: Progressing  Goal: Patient/Family Short Term Goal  Description: Patient's Short Term Goal: To feel better    Interventions:   - Monitor vital signs and labs  - Monitor blood glucose levels  - Pain management as needed  - Diagnostics per order  - Follow MD orders  - Administer medications as ordered  - Assists with activities of daily living   - Update patient and family on plan of care  - Discharge planning  - See additional Care Plan goals for specific interventions  Outcome: Progressing     Vital signs stable at this time. Monitoring blood glucose levels. No acute changes noted at this moment. Fall precautions in place- bed alarm on and locked in lowest position. Call light within reach. Frequent rounding by nursing staff.

## 2023-03-22 NOTE — CM/SW NOTE
PT/OT now recommending [facility] at dc, pt and spouse agreeable to dc recommendation. [facility] referrals sent in [facility]. List of accepting facilities will be needed for choice. Pt has hx at [facility]. [facility] 69 level 1 screen completed and uploaded to aidin referral.    Plan: [facility] pending facility choice and medical clearance.     Collin Goss, DARYLN    [phone]

## 2023-03-22 NOTE — PLAN OF CARE
Patient alert and oriented x1. Up with 1-2 assist with the walker, guidance needed. 1:1 feed. Plan to discharge to [facility]. Call light within reach. All safety precautions in place. Frequent rounding by staff. Problem: Patient Centered Care  Goal: Patient preferences are identified and integrated in the patient's plan of care  Description: Interventions:  - What would you like us to know as we care for you?  From home with wife  - Provide timely, complete, and accurate information to patient/family  - Incorporate patient and family knowledge, values, beliefs, and cultural backgrounds into the planning and delivery of care  - Encourage patient/family to participate in care and decision-making at the level they choose  - Honor patient and family perspectives and choices  Outcome: Progressing     Problem: METABOLIC/FLUID AND ELECTROLYTES - ADULT  Goal: Electrolytes maintained within normal limits  Description: INTERVENTIONS:  - Monitor labs and rhythm and assess patient for signs and symptoms of electrolyte imbalances  - Administer electrolyte replacement as ordered  - Monitor response to electrolyte replacements, including rhythm and repeat lab results as appropriate  - Fluid restriction as ordered  - Instruct patient on fluid and nutrition restrictions as appropriate  Outcome: Progressing  Goal: Hemodynamic stability and optimal renal function maintained  Description: INTERVENTIONS:  - Monitor labs and assess for signs and symptoms of volume excess or deficit  - Monitor intake, output and patient weight  - Monitor urine specific gravity, serum osmolarity and serum sodium as indicated or ordered  - Monitor response to interventions for patient's volume status, including labs, urine output, blood pressure (other measures as available)  - Encourage oral intake as appropriate  - Instruct patient on fluid and nutrition restrictions as appropriate  Outcome: Progressing     Problem: MUSCULOSKELETAL - ADULT  Goal: Return mobility to safest level of function  Description: INTERVENTIONS:  - Assess patient stability and activity tolerance for standing, transferring and ambulating w/ or w/o assistive devices  - Assist with transfers and ambulation using safe patient handling equipment as needed  - Ensure adequate protection for wounds/incisions during mobilization  - Obtain PT/OT consults as needed  - Advance activity as appropriate  - Communicate ordered activity level and limitations with patient/family  Outcome: Progressing     Problem: Impaired Functional Mobility  Goal: Achieve highest/safest level of mobility/gait  Description: Interventions:  - Assess patient's functional ability and stability  - Promote increasing activity/tolerance for mobility and gait  - Educate and engage patient/family in tolerated activity level and precautions  - Recommend use of  RW for transfers and ambulation  Outcome: Progressing     Problem: Impaired Activities of Daily Living  Goal: Achieve highest/safest level of independence in self care  Description: Interventions:  - Assess ability and encourage patient to participate in ADLs to maximize function  - Promote sitting position while performing ADLs such as feeding, grooming, and bathing  - Educate and encourage patient/family in tolerated functional activity level and precautions during self-care  - Encourage patient to incorporate impaired side during daily activities to promote function  Outcome: Progressing     Problem: Impaired Cognition  Goal: Patient will exhibit improved attention, thought processing and/or memory  Description: Interventions:  - Minimize distractions in the room when full attention is required  Outcome: Progressing     Problem: SAFETY ADULT - FALL  Goal: Free from fall injury  Description: INTERVENTIONS:  - Assess pt frequently for physical needs  - Identify cognitive and physical deficits and behaviors that affect risk of falls.   - [place] fall precautions as indicated by assessment.  - Educate pt/family on patient safety including physical limitations  - Instruct pt to call for assistance with activity based on assessment  - Modify environment to reduce risk of injury  - Provide assistive devices as appropriate  - Consider OT/PT consult to assist with strengthening/mobility  - Encourage toileting schedule  Outcome: Progressing     Problem: Patient/Family Goals  Goal: Patient/Family Long Term Goal  Description: Patient's Long Term Goal: To go home    Interventions:  - Monitor vital signs and labs  - Diagnostics per order  - Follow MD orders  - Administer medications as ordered  - Update patient and family on plan of care  - Discharge planning  - See additional Care Plan goals for specific interventions  Outcome: Progressing  Goal: Patient/Family Short Term Goal  Description: Patient's Short Term Goal: To feel better    Interventions:   - Monitor vital signs and labs  - Monitor blood glucose levels  - Pain management as needed  - Diagnostics per order  - Follow MD orders  - Administer medications as ordered  - Assists with activities of daily living   - Update patient and family on plan of care  - Discharge planning  - See additional Care Plan goals for specific interventions  Outcome: Progressing

## 2023-03-23 VITALS
BODY MASS INDEX: 23 KG/M2 | RESPIRATION RATE: 16 BRPM | DIASTOLIC BLOOD PRESSURE: 73 MMHG | HEART RATE: 78 BPM | OXYGEN SATURATION: 98 % | SYSTOLIC BLOOD PRESSURE: 149 MMHG | WEIGHT: 158 LBS | TEMPERATURE: 98 F

## 2023-03-23 LAB
ANION GAP SERPL CALC-SCNC: 11 MMOL/L (ref 0–18)
BUN BLD-MCNC: 29 MG/DL (ref 7–18)
BUN/CREAT SERPL: 14.9 (ref 10–20)
CALCIUM BLD-MCNC: 10 MG/DL (ref 8.5–10.1)
CHLORIDE SERPL-SCNC: 112 MMOL/L (ref 98–112)
CO2 SERPL-SCNC: 21 MMOL/L (ref 21–32)
CREAT BLD-MCNC: 1.95 MG/DL
GFR SERPLBLD BASED ON 1.73 SQ M-ARVRAT: 35 ML/MIN/1.73M2 (ref 60–?)
GLUCOSE BLD-MCNC: 107 MG/DL (ref 70–99)
GLUCOSE BLDC GLUCOMTR-MCNC: 108 MG/DL (ref 70–99)
GLUCOSE BLDC GLUCOMTR-MCNC: 128 MG/DL (ref 70–99)
OSMOLALITY SERPL CALC.SUM OF ELEC: 304 MOSM/KG (ref 275–295)
POTASSIUM SERPL-SCNC: 3.7 MMOL/L (ref 3.5–5.1)
SARS-COV-2 RNA RESP QL NAA+PROBE: NOT DETECTED
SODIUM SERPL-SCNC: 144 MMOL/L (ref 136–145)

## 2023-03-23 RX ORDER — LEVOFLOXACIN 750 MG/1
750 TABLET ORAL DAILY
Qty: 2 TABLET | Refills: 0 | Status: SHIPPED | OUTPATIENT
Start: 2023-03-23

## 2023-03-23 RX ORDER — AMLODIPINE BESYLATE 10 MG/1
10 TABLET ORAL DAILY
Status: DISCONTINUED | OUTPATIENT
Start: 2023-03-24 | End: 2023-03-23

## 2023-03-23 RX ORDER — VANCOMYCIN HYDROCHLORIDE 125 MG/1
125 CAPSULE ORAL DAILY
Status: DISCONTINUED | OUTPATIENT
Start: 2023-03-23 | End: 2023-03-23

## 2023-03-23 NOTE — PLAN OF CARE
Frequent rounding maintained. Patient educated on all medications administered. Bed in lowest position, bed alarm and i bed awareness activated, fall precautions in place and call light within reach at all times. All patients questions answered and needs addressed promptly. Problem: Patient Centered Care  Goal: Patient preferences are identified and integrated in the patient's plan of care  Description: Interventions:  - What would you like us to know as we care for you?  From home with wife  - Provide timely, complete, and accurate information to patient/family  - Incorporate patient and family knowledge, values, beliefs, and cultural backgrounds into the planning and delivery of care  - Encourage patient/family to participate in care and decision-making at the level they choose  - Honor patient and family perspectives and choices  Outcome: Progressing     Problem: METABOLIC/FLUID AND ELECTROLYTES - ADULT  Goal: Electrolytes maintained within normal limits  Description: INTERVENTIONS:  - Monitor labs and rhythm and assess patient for signs and symptoms of electrolyte imbalances  - Administer electrolyte replacement as ordered  - Monitor response to electrolyte replacements, including rhythm and repeat lab results as appropriate  - Fluid restriction as ordered  - Instruct patient on fluid and nutrition restrictions as appropriate  Outcome: Progressing  Goal: Hemodynamic stability and optimal renal function maintained  Description: INTERVENTIONS:  - Monitor labs and assess for signs and symptoms of volume excess or deficit  - Monitor intake, output and patient weight  - Monitor urine specific gravity, serum osmolarity and serum sodium as indicated or ordered  - Monitor response to interventions for patient's volume status, including labs, urine output, blood pressure (other measures as available)  - Encourage oral intake as appropriate  - Instruct patient on fluid and nutrition restrictions as appropriate  Outcome: Progressing     Problem: MUSCULOSKELETAL - ADULT  Goal: Return mobility to safest level of function  Description: INTERVENTIONS:  - Assess patient stability and activity tolerance for standing, transferring and ambulating w/ or w/o assistive devices  - Assist with transfers and ambulation using safe patient handling equipment as needed  - Ensure adequate protection for wounds/incisions during mobilization  - Obtain PT/OT consults as needed  - Advance activity as appropriate  - Communicate ordered activity level and limitations with patient/family  Outcome: Progressing     Problem: Impaired Functional Mobility  Goal: Achieve highest/safest level of mobility/gait  Description: Interventions:  - Assess patient's functional ability and stability  - Promote increasing activity/tolerance for mobility and gait  - Educate and engage patient/family in tolerated activity level and precautions    Outcome: Progressing     Problem: Impaired Activities of Daily Living  Goal: Achieve highest/safest level of independence in self care  Description: Interventions:  - Assess ability and encourage patient to participate in ADLs to maximize function  - Promote sitting position while performing ADLs such as feeding, grooming, and bathing  - Educate and encourage patient/family in tolerated functional activity level and precautions during self-care    Outcome: Progressing        Problem: SAFETY ADULT - FALL  Goal: Free from fall injury  Description: INTERVENTIONS:  - Assess pt frequently for physical needs  - Identify cognitive and physical deficits and behaviors that affect risk of falls.   - [place] fall precautions as indicated by assessment.  - Educate pt/family on patient safety including physical limitations  - Instruct pt to call for assistance with activity based on assessment  - Modify environment to reduce risk of injury  - Provide assistive devices as appropriate  - Consider OT/PT consult to assist with strengthening/mobility  - Encourage toileting schedule  Outcome: Progressing     Problem: Patient/Family Goals  Goal: Patient/Family Long Term Goal  Description: Patient's Long Term Goal: To go home    Interventions:  - Monitor vital signs and labs  - Diagnostics per order  - Follow MD orders  - Administer medications as ordered  - Update patient and family on plan of care  - Discharge planning  - See additional Care Plan goals for specific interventions  Outcome: Progressing  Goal: Patient/Family Short Term Goal  Description: Patient's Short Term Goal: To feel better    Interventions:   - Monitor vital signs and labs  - Monitor blood glucose levels  - Pain management as needed  - Diagnostics per order  - Follow MD orders  - Administer medications as ordered  - Assists with activities of daily living   - Update patient and family on plan of care  - Discharge planning  - See additional Care Plan goals for specific interventions  Outcome: Progressing

## 2023-03-23 NOTE — CM/SW NOTE
03/23/23 1226   Discharge disposition   Expected discharge disposition [facility] Provider   ([address])   Discharge transportation [facility]     Pt discussed during nursing rounds. Pt is stable for dc today. MD dc order entered. [facility] [address] for [facility], liaison confirmed bed is available today. Pt's spouse agreeable to transfer today. [facility] scheduled for 3pm pick u. Number for nurse is [phone]. Plan: ABELARDO [facility] for [facility] today. / to remain available for support and/or discharge planning.      AMADO Goodman    [phone]

## 2023-03-23 NOTE — CM/SW NOTE
Pt is ready for dc, MD dc order entered. List of accepting [facility] facilities provided to patient and spouse at bedside. Rajinder Rhoades is chosen facility at [facility]. [address] and CM requested bed for today. 1220: Pt will require COVID MC waiver at dc per liaison Jacinto Drew at [facility]. MARLENE requested that MD copy and past the below waiver statement and add to MD note for today:    Patient will need Skilled Nursing and therapy services under CMS 1135 waiver due to UTI, KIMBERLEY, and weakness. Plan: [facility] [place] for [facility] pending bed availability.     DARYL SawyerN    [phone]

## 2023-03-23 NOTE — PLAN OF CARE
Discharge RN Summary: Patient has discharge order in. Patient to discharge [facility]. IV removed by [facility]. Understands to follow up with PCP in 1 week. Report given to Kaity at [facility]. All questions answered. Problem: Patient Centered Care  Goal: Patient preferences are identified and integrated in the patient's plan of care  Description: Interventions:  - What would you like us to know as we care for you?  From home with wife  - Provide timely, complete, and accurate information to patient/family  - Incorporate patient and family knowledge, values, beliefs, and cultural backgrounds into the planning and delivery of care  - Encourage patient/family to participate in care and decision-making at the level they choose  - Honor patient and family perspectives and choices  Outcome: Adequate for Discharge     Problem: METABOLIC/FLUID AND ELECTROLYTES - ADULT  Goal: Electrolytes maintained within normal limits  Description: INTERVENTIONS:  - Monitor labs and rhythm and assess patient for signs and symptoms of electrolyte imbalances  - Administer electrolyte replacement as ordered  - Monitor response to electrolyte replacements, including rhythm and repeat lab results as appropriate  - Fluid restriction as ordered  - Instruct patient on fluid and nutrition restrictions as appropriate  Outcome: Adequate for Discharge  Goal: Hemodynamic stability and optimal renal function maintained  Description: INTERVENTIONS:  - Monitor labs and assess for signs and symptoms of volume excess or deficit  - Monitor intake, output and patient weight  - Monitor urine specific gravity, serum osmolarity and serum sodium as indicated or ordered  - Monitor response to interventions for patient's volume status, including labs, urine output, blood pressure (other measures as available)  - Encourage oral intake as appropriate  - Instruct patient on fluid and nutrition restrictions as appropriate  Outcome: Adequate for Discharge Problem: MUSCULOSKELETAL - ADULT  Goal: Return mobility to safest level of function  Description: INTERVENTIONS:  - Assess patient stability and activity tolerance for standing, transferring and ambulating w/ or w/o assistive devices  - Assist with transfers and ambulation using safe patient handling equipment as needed  - Ensure adequate protection for wounds/incisions during mobilization  - Obtain PT/OT consults as needed  - Advance activity as appropriate  - Communicate ordered activity level and limitations with patient/family  Outcome: Adequate for Discharge     Problem: Impaired Functional Mobility  Goal: Achieve highest/safest level of mobility/gait  Description: Interventions:  - Assess patient's functional ability and stability  - Promote increasing activity/tolerance for mobility and gait  - Educate and engage patient/family in tolerated activity level and precautions  - Recommend use of Rolling walker  Outcome: Adequate for Discharge     Problem: Impaired Activities of Daily Living  Goal: Achieve highest/safest level of independence in self care  Description: Interventions:  - Assess ability and encourage patient to participate in ADLs to maximize function  - Promote sitting position while performing ADLs such as feeding, grooming, and bathing  - Educate and encourage patient/family in tolerated functional activity level and precautions during self-care  - Encourage patient to incorporate impaired side during daily activities to promote function  Outcome: Adequate for Discharge     Problem: Impaired Cognition  Goal: Patient will exhibit improved attention, thought processing and/or memory  Description: Interventions:  - Minimize distractions in the room when full attention is required  Outcome: Adequate for Discharge     Problem: SAFETY ADULT - FALL  Goal: Free from fall injury  Description: INTERVENTIONS:  - Assess pt frequently for physical needs  - Identify cognitive and physical deficits and behaviors that affect risk of falls.   - [place] fall precautions as indicated by assessment.  - Educate pt/family on patient safety including physical limitations  - Instruct pt to call for assistance with activity based on assessment  - Modify environment to reduce risk of injury  - Provide assistive devices as appropriate  - Consider OT/PT consult to assist with strengthening/mobility  - Encourage toileting schedule  Outcome: Adequate for Discharge     Problem: Patient/Family Goals  Goal: Patient/Family Long Term Goal  Description: Patient's Long Term Goal: To go home    Interventions:  - Monitor vital signs and labs  - Diagnostics per order  - Follow MD orders  - Administer medications as ordered  - Update patient and family on plan of care  - Discharge planning  - See additional Care Plan goals for specific interventions  Outcome: Adequate for Discharge  Goal: Patient/Family Short Term Goal  Description: Patient's Short Term Goal: To feel better    Interventions:   - Monitor vital signs and labs  - Monitor blood glucose levels  - Pain management as needed  - Diagnostics per order  - Follow MD orders  - Administer medications as ordered  - Assists with activities of daily living   - Update patient and family on plan of care  - Discharge planning  - See additional Care Plan goals for specific interventions  Outcome: Adequate for Discharge

## 2023-04-10 NOTE — PLAN OF CARE
Problem: Patient Centered Care  Goal: Patient preferences are identified and integrated in the patient's plan of care  Interventions:  - What would you like us to know as we care for you? Pt w/ h/o stroke, lives at home with wife and daughter. Pleasant. behavior  - Position to facilitate oxygenation and minimize respiratory effort  - Oxygen supplementation based on oxygen saturation or ABGs  - Provide Smoking Cessation handout, if applicable  - Encourage broncho-pulmonary hygiene including cough, deep sobeida physical limitations  - Instruct pt to call for assistance with activity based on assessment  - Modify environment to reduce risk of injury  - Provide assistive devices as appropriate  - Consider OT/PT consult to assist with strengthening/mobility  - [place] MIKE

## 2023-05-18 ENCOUNTER — LAB REQUISITION (OUTPATIENT)
Dept: LAB | Facility: HOSPITAL | Age: 78
End: 2023-05-18
Payer: MEDICARE

## 2023-05-18 DIAGNOSIS — I12.9 HYPERTENSIVE CHRONIC KIDNEY DISEASE WITH STAGE 1 THROUGH STAGE 4 CHRONIC KIDNEY DISEASE, OR UNSPECIFIED CHRONIC KIDNEY DISEASE: ICD-10-CM

## 2023-05-18 LAB
BILIRUB UR QL: NEGATIVE
CLARITY UR: CLEAR
GLUCOSE UR-MCNC: 300 MG/DL
HGB UR QL STRIP.AUTO: NEGATIVE
KETONES UR-MCNC: NEGATIVE MG/DL
LEUKOCYTE ESTERASE UR QL STRIP.AUTO: 500
NITRITE UR QL STRIP.AUTO: NEGATIVE
PH UR: 6 [PH] (ref 5–8)
PROT UR-MCNC: 50 MG/DL
SP GR UR STRIP: 1.01 (ref 1–1.03)
UROBILINOGEN UR STRIP-ACNC: NORMAL
WBC #/AREA URNS AUTO: >50 /HPF

## 2023-05-18 PROCEDURE — 87086 URINE CULTURE/COLONY COUNT: CPT | Performed by: NURSE PRACTITIONER

## 2023-05-18 PROCEDURE — 81001 URINALYSIS AUTO W/SCOPE: CPT | Performed by: NURSE PRACTITIONER

## 2023-05-18 PROCEDURE — 87186 SC STD MICRODIL/AGAR DIL: CPT | Performed by: NURSE PRACTITIONER

## 2023-05-18 PROCEDURE — 87077 CULTURE AEROBIC IDENTIFY: CPT | Performed by: NURSE PRACTITIONER

## 2023-09-24 ENCOUNTER — APPOINTMENT (OUTPATIENT)
Dept: CT IMAGING | Facility: HOSPITAL | Age: 78
End: 2023-09-24
Attending: EMERGENCY MEDICINE
Payer: MEDICARE

## 2023-09-24 ENCOUNTER — APPOINTMENT (OUTPATIENT)
Dept: GENERAL RADIOLOGY | Facility: HOSPITAL | Age: 78
End: 2023-09-24
Attending: EMERGENCY MEDICINE
Payer: MEDICARE

## 2023-09-24 ENCOUNTER — HOSPITAL ENCOUNTER (EMERGENCY)
Facility: HOSPITAL | Age: 78
Discharge: HOME | End: 2023-09-25
Attending: EMERGENCY MEDICINE
Payer: MEDICARE

## 2023-09-24 DIAGNOSIS — R07.9 CHEST PAIN OF UNCERTAIN ETIOLOGY: Primary | ICD-10-CM

## 2023-09-24 LAB
ANION GAP SERPL CALC-SCNC: 8 MMOL/L (ref 0–18)
BASOPHILS # BLD AUTO: 0.03 X10(3) UL (ref 0–0.2)
BASOPHILS NFR BLD AUTO: 0.5 %
BUN BLD-MCNC: 32 MG/DL (ref 7–18)
BUN/CREAT SERPL: 11.7 (ref 10–20)
CALCIUM BLD-MCNC: 9.3 MG/DL (ref 8.5–10.1)
CHLORIDE SERPL-SCNC: 114 MMOL/L (ref 98–112)
CO2 SERPL-SCNC: 24 MMOL/L (ref 21–32)
CREAT BLD-MCNC: 2.74 MG/DL
DEPRECATED RDW RBC AUTO: 46.2 FL (ref 35.1–46.3)
EGFRCR SERPLBLD CKD-EPI 2021: 23 ML/MIN/1.73M2 (ref 60–?)
EOSINOPHIL # BLD AUTO: 0.21 X10(3) UL (ref 0–0.7)
EOSINOPHIL NFR BLD AUTO: 3.5 %
ERYTHROCYTE [DISTWIDTH] IN BLOOD BY AUTOMATED COUNT: 13 % (ref 11–15)
GLUCOSE BLD-MCNC: 88 MG/DL (ref 70–99)
GLUCOSE BLDC GLUCOMTR-MCNC: 68 MG/DL (ref 70–99)
GLUCOSE BLDC GLUCOMTR-MCNC: 96 MG/DL (ref 70–99)
HCT VFR BLD AUTO: 40.5 %
HGB BLD-MCNC: 13.1 G/DL
IMM GRANULOCYTES # BLD AUTO: 0.02 X10(3) UL (ref 0–1)
IMM GRANULOCYTES NFR BLD: 0.3 %
LYMPHOCYTES # BLD AUTO: 2.8 X10(3) UL (ref 1–4)
LYMPHOCYTES NFR BLD AUTO: 46.8 %
MCH RBC QN AUTO: 31.2 PG (ref 26–34)
MCHC RBC AUTO-ENTMCNC: 32.3 G/DL (ref 31–37)
MCV RBC AUTO: 96.4 FL
MONOCYTES # BLD AUTO: 0.48 X10(3) UL (ref 0.1–1)
MONOCYTES NFR BLD AUTO: 8 %
NEUTROPHILS # BLD AUTO: 2.44 X10 (3) UL (ref 1.5–7.7)
NEUTROPHILS # BLD AUTO: 2.44 X10(3) UL (ref 1.5–7.7)
NEUTROPHILS NFR BLD AUTO: 40.9 %
OSMOLALITY SERPL CALC.SUM OF ELEC: 308 MOSM/KG (ref 275–295)
PLATELET # BLD AUTO: 145 10(3)UL (ref 150–450)
POTASSIUM SERPL-SCNC: 4.5 MMOL/L (ref 3.5–5.1)
RBC # BLD AUTO: 4.2 X10(6)UL
SODIUM SERPL-SCNC: 146 MMOL/L (ref 136–145)
TROPONIN I HIGH SENSITIVITY: 18 NG/L
WBC # BLD AUTO: 6 X10(3) UL (ref 4–11)

## 2023-09-24 PROCEDURE — 80048 BASIC METABOLIC PNL TOTAL CA: CPT | Performed by: EMERGENCY MEDICINE

## 2023-09-24 PROCEDURE — 99285 EMERGENCY DEPT VISIT HI MDM: CPT

## 2023-09-24 PROCEDURE — 82962 GLUCOSE BLOOD TEST: CPT

## 2023-09-24 PROCEDURE — 71275 CT ANGIOGRAPHY CHEST: CPT | Performed by: EMERGENCY MEDICINE

## 2023-09-24 PROCEDURE — 74175 CTA ABDOMEN W/CONTRAST: CPT | Performed by: EMERGENCY MEDICINE

## 2023-09-24 PROCEDURE — 84484 ASSAY OF TROPONIN QUANT: CPT | Performed by: EMERGENCY MEDICINE

## 2023-09-24 PROCEDURE — 85025 COMPLETE CBC W/AUTO DIFF WBC: CPT | Performed by: EMERGENCY MEDICINE

## 2023-09-24 PROCEDURE — 36415 COLL VENOUS BLD VENIPUNCTURE: CPT

## 2023-09-24 PROCEDURE — 99284 EMERGENCY DEPT VISIT MOD MDM: CPT

## 2023-09-24 PROCEDURE — 93010 ELECTROCARDIOGRAM REPORT: CPT

## 2023-09-24 PROCEDURE — 93005 ELECTROCARDIOGRAM TRACING: CPT

## 2023-09-24 PROCEDURE — 71045 X-RAY EXAM CHEST 1 VIEW: CPT | Performed by: EMERGENCY MEDICINE

## 2023-09-24 RX ORDER — LOSARTAN POTASSIUM 25 MG/1
25 TABLET ORAL ONCE
Status: COMPLETED | OUTPATIENT
Start: 2023-09-24 | End: 2023-09-24

## 2023-09-24 NOTE — ED INITIAL ASSESSMENT (HPI)
Patient arrives by EMS for complaints of chest pain x 2 weeks, patient reports he came today to get it checked out but pain has not worsened. Denies SOB.  Denies n/v/d/f.

## 2023-09-25 VITALS
RESPIRATION RATE: 17 BRPM | HEIGHT: 68 IN | SYSTOLIC BLOOD PRESSURE: 154 MMHG | TEMPERATURE: 99 F | DIASTOLIC BLOOD PRESSURE: 87 MMHG | HEART RATE: 59 BPM | BODY MASS INDEX: 25.76 KG/M2 | OXYGEN SATURATION: 96 % | WEIGHT: 170 LBS

## 2023-09-25 LAB
ATRIAL RATE: 58 BPM
P AXIS: 24 DEGREES
P-R INTERVAL: 184 MS
Q-T INTERVAL: 402 MS
QRS DURATION: 96 MS
QTC CALCULATION (BEZET): 394 MS
R AXIS: -19 DEGREES
T AXIS: -45 DEGREES
VENTRICULAR RATE: 58 BPM

## 2023-09-25 NOTE — ED QUICK NOTES
Patient resting in bed, RR even and unlabored. Denies any complaints at this time. [facility]- eta 2300. MD aware of /78- no new orders at this time.

## 2023-09-25 NOTE — ED QUICK NOTES
Updated patient's spouse on results and plan to discharge home. Patient's wife reports understanding and states she will be waiting for ambulance to arrive to be let in. ETA given to patient's spouse. Patient resting in bed, RR even and unlabored. Patient denies complaints at this time.

## 2023-09-25 NOTE — ED QUICK NOTES
MD made aware of BP difference at time of discharge. MD at bedside with [address], MD to order CTA. Will notify patient spouse.

## 2023-12-04 ENCOUNTER — APPOINTMENT (OUTPATIENT)
Dept: GENERAL RADIOLOGY | Facility: HOSPITAL | Age: 78
End: 2023-12-04
Attending: EMERGENCY MEDICINE
Payer: MEDICARE

## 2023-12-04 ENCOUNTER — HOSPITAL ENCOUNTER (EMERGENCY)
Facility: HOSPITAL | Age: 78
Discharge: NURSING, REHAB OR LONG-TERM CARE | End: 2023-12-04
Attending: EMERGENCY MEDICINE
Payer: MEDICARE

## 2023-12-04 ENCOUNTER — APPOINTMENT (OUTPATIENT)
Dept: CT IMAGING | Facility: HOSPITAL | Age: 78
End: 2023-12-04
Attending: EMERGENCY MEDICINE
Payer: MEDICARE

## 2023-12-04 VITALS
OXYGEN SATURATION: 97 % | DIASTOLIC BLOOD PRESSURE: 100 MMHG | BODY MASS INDEX: 26 KG/M2 | TEMPERATURE: 98 F | SYSTOLIC BLOOD PRESSURE: 215 MMHG | HEART RATE: 87 BPM | WEIGHT: 169.75 LBS | RESPIRATION RATE: 18 BRPM

## 2023-12-04 DIAGNOSIS — N30.90 CYSTITIS: ICD-10-CM

## 2023-12-04 DIAGNOSIS — R53.1 WEAKNESS: Primary | ICD-10-CM

## 2023-12-04 LAB
ALBUMIN SERPL-MCNC: 4.2 G/DL (ref 3.2–4.8)
ALP LIVER SERPL-CCNC: 104 U/L
ALT SERPL-CCNC: <7 U/L
ANION GAP SERPL CALC-SCNC: 11 MMOL/L (ref 0–18)
AST SERPL-CCNC: 13 U/L (ref ?–34)
ATRIAL RATE: 95 BPM
BASOPHILS # BLD AUTO: 0.05 X10(3) UL (ref 0–0.2)
BASOPHILS NFR BLD AUTO: 0.6 %
BILIRUB DIRECT SERPL-MCNC: 0.3 MG/DL (ref ?–0.3)
BILIRUB SERPL-MCNC: 0.7 MG/DL (ref 0.2–1.1)
BILIRUB UR QL: NEGATIVE
BUN BLD-MCNC: 34 MG/DL (ref 9–23)
BUN/CREAT SERPL: 10.8 (ref 10–20)
CALCIUM BLD-MCNC: 9.5 MG/DL (ref 8.7–10.4)
CHLORIDE SERPL-SCNC: 112 MMOL/L (ref 98–112)
CO2 SERPL-SCNC: 19 MMOL/L (ref 21–32)
COLOR UR: YELLOW
CREAT BLD-MCNC: 3.16 MG/DL
DEPRECATED RDW RBC AUTO: 42 FL (ref 35.1–46.3)
EGFRCR SERPLBLD CKD-EPI 2021: 19 ML/MIN/1.73M2 (ref 60–?)
EOSINOPHIL # BLD AUTO: 0.23 X10(3) UL (ref 0–0.7)
EOSINOPHIL NFR BLD AUTO: 3 %
ERYTHROCYTE [DISTWIDTH] IN BLOOD BY AUTOMATED COUNT: 12.9 % (ref 11–15)
FLUAV + FLUBV RNA SPEC NAA+PROBE: NEGATIVE
FLUAV + FLUBV RNA SPEC NAA+PROBE: NEGATIVE
GLUCOSE BLD-MCNC: 108 MG/DL (ref 70–99)
GLUCOSE UR-MCNC: 500 MG/DL
HCT VFR BLD AUTO: 39.8 %
HGB BLD-MCNC: 14 G/DL
IMM GRANULOCYTES # BLD AUTO: 0.02 X10(3) UL (ref 0–1)
IMM GRANULOCYTES NFR BLD: 0.3 %
LEUKOCYTE ESTERASE UR QL STRIP.AUTO: 500
LYMPHOCYTES # BLD AUTO: 2.23 X10(3) UL (ref 1–4)
LYMPHOCYTES NFR BLD AUTO: 28.9 %
MCH RBC QN AUTO: 31.3 PG (ref 26–34)
MCHC RBC AUTO-ENTMCNC: 35.2 G/DL (ref 31–37)
MCV RBC AUTO: 89 FL
MONOCYTES # BLD AUTO: 0.54 X10(3) UL (ref 0.1–1)
MONOCYTES NFR BLD AUTO: 7 %
NEUTROPHILS # BLD AUTO: 4.65 X10 (3) UL (ref 1.5–7.7)
NEUTROPHILS # BLD AUTO: 4.65 X10(3) UL (ref 1.5–7.7)
NEUTROPHILS NFR BLD AUTO: 60.2 %
NITRITE UR QL STRIP.AUTO: NEGATIVE
OSMOLALITY SERPL CALC.SUM OF ELEC: 302 MOSM/KG (ref 275–295)
P AXIS: 38 DEGREES
P-R INTERVAL: 158 MS
PH UR: 5.5 [PH] (ref 5–8)
PLATELET # BLD AUTO: 169 10(3)UL (ref 150–450)
POTASSIUM SERPL-SCNC: 4.4 MMOL/L (ref 3.5–5.1)
PROT SERPL-MCNC: 7.2 G/DL (ref 5.7–8.2)
PROT UR-MCNC: 50 MG/DL
Q-T INTERVAL: 362 MS
QRS DURATION: 100 MS
QTC CALCULATION (BEZET): 454 MS
R AXIS: 71 DEGREES
RBC # BLD AUTO: 4.47 X10(6)UL
RBC #/AREA URNS AUTO: >10 /HPF
RSV RNA SPEC NAA+PROBE: NEGATIVE
SARS-COV-2 RNA RESP QL NAA+PROBE: NOT DETECTED
SODIUM SERPL-SCNC: 142 MMOL/L (ref 136–145)
SP GR UR STRIP: 1.02 (ref 1–1.03)
T AXIS: -72 DEGREES
UROBILINOGEN UR STRIP-ACNC: NORMAL
VENTRICULAR RATE: 95 BPM
WBC # BLD AUTO: 7.7 X10(3) UL (ref 4–11)
WBC #/AREA URNS AUTO: >50 /HPF

## 2023-12-04 PROCEDURE — 71045 X-RAY EXAM CHEST 1 VIEW: CPT | Performed by: EMERGENCY MEDICINE

## 2023-12-04 PROCEDURE — 85025 COMPLETE CBC W/AUTO DIFF WBC: CPT | Performed by: EMERGENCY MEDICINE

## 2023-12-04 PROCEDURE — 0241U SARS-COV-2/FLU A AND B/RSV BY PCR (GENEXPERT): CPT | Performed by: EMERGENCY MEDICINE

## 2023-12-04 PROCEDURE — 36415 COLL VENOUS BLD VENIPUNCTURE: CPT

## 2023-12-04 PROCEDURE — 81001 URINALYSIS AUTO W/SCOPE: CPT | Performed by: EMERGENCY MEDICINE

## 2023-12-04 PROCEDURE — 70450 CT HEAD/BRAIN W/O DYE: CPT | Performed by: EMERGENCY MEDICINE

## 2023-12-04 PROCEDURE — 93005 ELECTROCARDIOGRAM TRACING: CPT

## 2023-12-04 PROCEDURE — 99285 EMERGENCY DEPT VISIT HI MDM: CPT

## 2023-12-04 PROCEDURE — 93010 ELECTROCARDIOGRAM REPORT: CPT

## 2023-12-04 PROCEDURE — 80048 BASIC METABOLIC PNL TOTAL CA: CPT | Performed by: EMERGENCY MEDICINE

## 2023-12-04 PROCEDURE — 80076 HEPATIC FUNCTION PANEL: CPT | Performed by: EMERGENCY MEDICINE

## 2023-12-04 RX ORDER — LOSARTAN POTASSIUM 100 MG/1
100 TABLET ORAL DAILY
COMMUNITY

## 2023-12-04 RX ORDER — AMLODIPINE BESYLATE 5 MG/1
5 TABLET ORAL ONCE
Status: COMPLETED | OUTPATIENT
Start: 2023-12-04 | End: 2023-12-04

## 2023-12-04 RX ORDER — LOSARTAN POTASSIUM 100 MG/1
100 TABLET ORAL ONCE
Status: COMPLETED | OUTPATIENT
Start: 2023-12-04 | End: 2023-12-04

## 2023-12-04 RX ORDER — FLUTICASONE FUROATE AND VILANTEROL 100; 25 UG/1; UG/1
1 POWDER RESPIRATORY (INHALATION) DAILY
COMMUNITY

## 2023-12-04 RX ORDER — AMLODIPINE BESYLATE 5 MG/1
5 TABLET ORAL DAILY
COMMUNITY

## 2023-12-04 RX ORDER — CEPHALEXIN 500 MG/1
500 CAPSULE ORAL 3 TIMES DAILY
Qty: 21 CAPSULE | Refills: 0 | Status: SHIPPED | OUTPATIENT
Start: 2023-12-04 | End: 2023-12-11

## 2023-12-04 RX ORDER — TRAZODONE HYDROCHLORIDE 50 MG/1
50 TABLET ORAL NIGHTLY
COMMUNITY

## 2023-12-04 NOTE — ED INITIAL ASSESSMENT (HPI)
Patient arrives via ems from home for increase in difficulty with ambulation x 1 week. Family reports fall earlier and the week and patient denies any complications from that fall. History of strokes.

## 2023-12-04 NOTE — CM/SW NOTE
Spoke with patient's wife, Hernandez Seth, at bedside, regarding patient 's current living situation and status. Patient lives with wife and dtr, uses a walker to ambulate but over the past week or so, patient is weaker, has fallen and is having difficulties walking. Hernandez Vieirabrandy stated that she is unable to help patient up from the chair and bed. Hernandez Seth also stated that she has been discussing, her dtr Martees options for patient as it becomes gets more and more difficult to care for him at home. [facility] provided Hernandez Seth with resources for SNF, respite care, caregivers and [facility] to help navigate patient's need. Patient's secondary insurance is [facility] and Hernandez Dorinda stated that patient was in [facility] last year and they really liked it    [facility] called Johnny Grady liaison, to ask if patient's [facility] has a plan that patient could be directly admitted from the ER. [facility] sent referral to [facility] via [facility].

## 2023-12-04 NOTE — CM/SW NOTE
Received call from [address] [place] [address] with Junious Belle stating that patient has been accepted to [place] [facility] [address]. PASRR Level 1 completed    Spoke with patient's wife and she is agreeable to [place] [facility] [address] for rehab. Receiving RN :  [phone]    [facility] arranged to transport patient to [place] [address]. ETA 600pm    PCS form completed in Epic.

## 2023-12-05 ENCOUNTER — INITIAL APN SNF VISIT (OUTPATIENT)
Facility: SKILLED NURSING FACILITY | Age: 78
End: 2023-12-05

## 2023-12-05 DIAGNOSIS — J44.9 CHRONIC OBSTRUCTIVE PULMONARY DISEASE, UNSPECIFIED COPD TYPE (HCC): ICD-10-CM

## 2023-12-05 DIAGNOSIS — M10.9 GOUT, UNSPECIFIED CAUSE, UNSPECIFIED CHRONICITY, UNSPECIFIED SITE: ICD-10-CM

## 2023-12-05 DIAGNOSIS — R41.82 ALTERED MENTAL STATUS, UNSPECIFIED ALTERED MENTAL STATUS TYPE: ICD-10-CM

## 2023-12-05 DIAGNOSIS — B37.0 ORAL THRUSH: ICD-10-CM

## 2023-12-05 DIAGNOSIS — E11.9 TYPE 2 DIABETES MELLITUS WITHOUT COMPLICATION, UNSPECIFIED WHETHER LONG TERM INSULIN USE (HCC): ICD-10-CM

## 2023-12-05 DIAGNOSIS — R73.9 HYPERGLYCEMIA: ICD-10-CM

## 2023-12-05 DIAGNOSIS — G47.00 INSOMNIA, UNSPECIFIED TYPE: ICD-10-CM

## 2023-12-05 DIAGNOSIS — R53.81 PHYSICAL DECONDITIONING: ICD-10-CM

## 2023-12-05 DIAGNOSIS — N30.00 ACUTE CYSTITIS WITHOUT HEMATURIA: Primary | ICD-10-CM

## 2023-12-05 DIAGNOSIS — R53.1 WEAKNESS GENERALIZED: ICD-10-CM

## 2023-12-05 DIAGNOSIS — H40.9 GLAUCOMA, UNSPECIFIED GLAUCOMA TYPE, UNSPECIFIED LATERALITY: ICD-10-CM

## 2023-12-05 DIAGNOSIS — I10 HYPERTENSION, UNSPECIFIED TYPE: ICD-10-CM

## 2023-12-05 DIAGNOSIS — Z86.73 HISTORY OF CVA (CEREBROVASCULAR ACCIDENT): ICD-10-CM

## 2023-12-05 PROCEDURE — 1123F ACP DISCUSS/DSCN MKR DOCD: CPT | Performed by: NURSE PRACTITIONER

## 2023-12-05 PROCEDURE — 99306 1ST NF CARE HIGH MDM 50: CPT | Performed by: NURSE PRACTITIONER

## 2023-12-05 NOTE — PROGRESS NOTES
Tres Cazares  : 1945  Age 69year old  male patient is admitted to [address] [place] [address] for rehabilitation and strengthening. [address] [place] [address] Admission  -  (ER Visit only)     Chief Complaints: physical deconditioning, weakness and fall    HPI  Pt brought to [facility] ED on 23 due to fall and inability to ambulate for the past week. Pt lives at home with his wife and daughter and it became too difficult for them to care of Mal Garcia at home. He has past med hx significant for chronic kidney disease and CVA. CT of the head in ED was negative for any acute intracranial abnormality. Chest xray was negative and UA suspicious for cystitis. Pt was started on Cephalexin 500 mg TID x 7 days and discharged to [facility] for continuing nursing care and rehab. Pt seen as new APRN visit. Sitting up in chair and waiting for his physical therapy session. Pt reports feeling well. He is a poor historian, pleasantly confused, but could not tell me the date or place of where he was at. When I told him it is December and castro time he started laughing and said \"Really?!\". He initially thought it was August. He told me that normally he lives at home with his wife and daughter but now he cannot walk. He denies having any pain. Denies trouble breathing, cough or cheat pain. Denies constipation, diarrhea or bladder issues. On the exam, his tongue appears white and coated. Pt denies appetite change and states he had breakfast this am. Denies nausea or abdominal pain. VSS.      Past Medical History:   Diagnosis Date    Back pain     Back problem     BPH (benign prostatic hyperplasia)     bph    Chest pain     chest pain on exertion    Deep vein thrombosis (HCC)     Dementia (HCC)     Diabetes (HCC)     Fatigue     chronic fatigue    GERD (gastroesophageal reflux disease)     gerd    Gout     chronic gout    High cholesterol     History of stomach ulcers     HTN     Incontinence     Neuropathic pain     neuropathic pain of both legs    PVD (peripheral vascular disease) ([address]     pvd    Stroke [facility])      Past Surgical History:   Procedure Laterality Date    BACK SURGERY      CATH BARE METAL STENT (BMS)       Family History   Problem Relation Age of Onset    Heart Disorder Father     Cancer Father     Heart Disorder Mother     Cancer Mother         uterine    Cancer Sister     Diabetes Sister     Cancer Brother     Cancer Brother      Social History     Socioeconomic History    Marital status:    Tobacco Use    Smoking status: Former     Packs/day: 1.00     Years: 41.00     Additional pack years: 0.00     Total pack years: 41.00     Types: Cigarettes     Quit date: 2016     Years since quittin.9    Smokeless tobacco: Former     Quit date: 2016   Substance and Sexual Activity    Alcohol use: Yes     Comment: rare    Other Topics Concern    Caffeine Concern Yes     Comment: coffee 1 cup daily    Exercise No    Seat Belt No    Special Diet No    Stress Concern No    Weight Concern No       IMMUNIZATIONS  Immunization History   Administered Date(s) Administered    Covid-19 Vaccine Moderna 100 mcg/0.5 ml 2021, 2021    Covid-19 Vaccine Pfizer 30 mcg/0.3 ml 2021    FLU VAC High Dose 65 YRS & Older PRSV Free (19769) 10/19/2022        ALLERGIES:  No Known Allergies    CODE STATUS:  DNI     ADVANCED CARE PLANNING TEAM: Will need family care plan       CURRENT MEDICATIONS - reviewed and updated on SNF EMAR       SUBJECTIVE  Pt reports feeling well. He is a poor historian, pleasantly confused, but could not tell me the date or place of where he was at. When I told him it is December and castro time he started laughing and said \"Really?!\". He initially thought it was August. He told me that normally he lives at home with his wife and daughter but now he cannot walk. He denies having any pain. Denies trouble breathing, cough or cheat pain. Denies constipation, diarrhea or bladder issues.  On the exam, his tongue appears white and coated. Pt denies appetite change and states he had breakfast this am. Denies nausea or abdominal pain. PHYSICAL EXAM:  GENERAL HEALTH:Thin, frail appearing man in NAD   LINES, TUBES, DRAINS:  none  SKIN: no rashes, no suspicious lesions  WOUND: see wound assessment,   EYES: EOMI, sclera anicteric, conjunctiva normal; there is no nystagmus, no drainage from eyes  HENT: white coated tongue, thrush appearing   NECK:supple, FROM; no JVD, no TMG, no carotid bruits   BREAST: deferred exam   RESPIRATORY:clear to percussion and auscultation  CARDIOVASCULAR: S1, S2 normal, RRR; no S3, no S4  ABDOMEN:  normal active BS+, soft, nondistended; no organomegaly, no masses; no bruits; nontender, no guarding, no rebound tenderness. :Deferred  LYMPHATIC:no lymphedema  MUSCULOSKELETAL: generalized weakness. EXTREMITIES/VASCULAR:no cyanosis, clubbing or edema  NEUROLOGIC:intact; no sensorimotor deficit  PSYCHIATRIC: AOx1. Pleasantly confused. Able to follow commands     MEDICAL DECISION MAKING  Unable     DIAGNOSTICS REVIEWED AT THIS VISIT:  [address] [place] [address] medical records    SEE PLAN BELOW    Acute Cystitis  - Started on Cephalexin in ED on 12/4/23  - Continue Cephalexin 500 mg TID x 7 days, end date 12/12/23  - weekly labs in rehab   - monitor     Oral Thrush   - Moderate to severe white coating to tongue   - start Nystatin oral suspension swish and swallow 5 ml QID x 5 days, end date 12/10    Physical Deconditioning  - PT/OT/ST     HTN  - Pt was running his BPs in ED.  This morning /83  - continue Losartan Potassium 100 mg daily   - continue Amlodipine 5 mg daily   - cards to follow in rehab     Hx of Stroke  - continue eliquis 5 mg daily   - continue ASA 81 mg daily   - continue Atorvastatin 20 mg hs    DM type 2  - Last A1c value was 6.8% done 3/20/2023  - continue Dapagliflozin Propanediol 5 mg daily     Gout  - continue Colchicine 0.6 mg daily     Glaucoma   - continue Dorzolamide HCL-Timolol Maleate 22.3 - 6.8 mg/ml 1 drop to left eye BID     COPD   - continue Albuterol Sulfate 108 2 puffs q6h prn   - continue Fluticasone 1 puff daily   - monitor     Insomnia   - continue Trazodone 50 mg prn nightly, end date 12/19/23     FOLLOW UP APPOINTMENTS  No future appointments. This is a 60 minute visit and greater than 50% of the time was spent counseling the patient and/or coordinating care. Note to patient: The [facility] makes medical notes like these available to patients in the interest of transparency. However, this is a medical document intended as peer to peer communication. It is written in medical language and may contain abbreviations or verbiage that are unfamiliar. It may appear blunt or direct. Medical documents are intended to carry relevant information, facts as evident, and the clinical opinion of the practitioner who signs the document.      HENRRY Mcneill  12/05/23

## 2023-12-05 NOTE — ED QUICK NOTES
Lance Patrick RN Kristie notified of patient's elevated BP and plan of administration of home BP meds prior to transport via [place]. Kristie okay with plan. Meds given. [place] at bedside for transfer.

## 2023-12-11 ENCOUNTER — SNF VISIT (OUTPATIENT)
Facility: SKILLED NURSING FACILITY | Age: 78
End: 2023-12-11

## 2023-12-11 DIAGNOSIS — Z78.9 DECREASED ACTIVITIES OF DAILY LIVING (ADL): ICD-10-CM

## 2023-12-11 DIAGNOSIS — Z86.73 HISTORY OF CVA (CEREBROVASCULAR ACCIDENT): ICD-10-CM

## 2023-12-11 DIAGNOSIS — B37.0 THRUSH: Primary | ICD-10-CM

## 2023-12-11 DIAGNOSIS — Z79.899 MEDICATION MANAGEMENT: ICD-10-CM

## 2023-12-11 NOTE — PROGRESS NOTES
Mattie Calderón, 9/13/1945, 69year old, male    [address] for Subacute Rehab  This patient is also routinely followed by collaborating physician, Dr. Ramsey Panchal and other specialists upon consultation/facility practice. HPI:      Pt brought to [place] ED on 12/4/23 due to fall and inability to ambulate for the past week. Pt lives at home with his wife and daughter and it became too difficult for them to care of Amaury [place] at home. He has past med hx significant for chronic kidney disease and CVA. CT of the head in ED was negative for any acute intracranial abnormality. Chest xray was negative and UA suspicious for cystitis. Pt was started on Cephalexin 500 mg TID x 7 days and discharged to [facility] for continuing nursing care and rehab. Pt seen today in follow-up; up in chair, seated in the nurse's station. Pleasantly confused, alert and oriented to self. Pt denies pain. VSS. No observations or c/o cough/SOB/ESCALERA; afebrile, on RA. No observations or c/o CP/palp/dizziness/fatigue, no nausea/vomiting/diarrhea/constipation. No urinary changes/concerns reported. Continue therapy as able, encourage po intake, discharge planning with patient/family and rehab team. I'm told Mr. Robinson Andersen currently needs physical assistance of 2 staff for ADLs; goal of 1 person-assist prior to discharge. No further questions/concerns per patient, staff or [address] at this time. Labs \"unavailable,\" repeat in am.     PMHx, PSHx:      He  has a past medical history of Back pain, Back problem, BPH (benign prostatic hyperplasia), Chest pain, Deep vein thrombosis ([address] Dementia ([address] Diabetes ([address] Fatigue, GERD (gastroesophageal reflux disease), Gout, High cholesterol, History of stomach ulcers, HTN, Incontinence, Neuropathic pain, PVD (peripheral vascular disease) ([address] and Stroke ([address] He  has a past surgical history that includes cath bare metal stent (bms) and back surgery. TODAY:  Chief Complaint   Patient presents with    Follow - Up      SUBJECTIVE:     Pt seen today in follow-up; up in chair, seated in the nurse's station. Pleasantly confused, alert and oriented to self. Pt is drooling, thrush persists; extend treatment x 5 more days. Pt denies pain. VSS. No observations or c/o cough/SOB/ESCALERA; afebrile, on RA. No observations or c/o CP/palp/dizziness/fatigue, no nausea/vomiting/diarrhea/constipation. No urinary changes/concerns reported. Continue therapy as able, encourage po intake, discharge planning with patient/family and rehab team. I'm told Mr. Yoselyn Lopez currently needs physical assistance of 2 staff for ADLs; goal of 1 person-assist prior to discharge. No further questions/concerns per patient, staff or [address] at this time.      OBJECTIVE:     Vital signs: reviewed in [facility] EMR  Current medications: reviewed in [facility] EMR  Labs and imaging: reviewed in [facility] EMR- Labs \"unavailable,\" repeat in am.     ASSESSMENT, PHYSICAL EXAM:     GENERAL HEALTH: well developed, appears well nourished, in no apparent distress  LINES, TUBES, DRAINS:  none  SKIN: no rashes, no suspicious lesions, pale, warm, dry; to exposed skin; nursing performs skin checks, notify if alt in skin integrity as done routinely  WOUND: none visualized  EYES: conjunctiva normal, no drainage from eyes  HENT: normocephalic; normal nose, no nasal drainage, mucous membranes pink, moist; drooling   RESPIRATORY: clear to auscultation  CARDIOVASCULAR: S1, S2 normal, RRR  ABDOMEN: no guarding, active BS+, soft, non-distended; no visible masses; non-tender  : Deferred  MUSCULOSKELETAL: weakness improving, continue therapies to improve strength, endurance and independence with ADLs as able  EXTREMITIES/VASCULAR: no cyanosis or edema noted   NEUROLOGIC: follows commands  PSYCHIATRIC: alert and oriented to self, pleasantly confused    MEDICAL DECISION MAKING/ PLAN OF CARE:     Continue working with therapy to maximize abilities as able. SNF team and  staff assisting with discharge planning; DME recommendations and needs are TBD. Home health services including PT/OT, RN and bath aide are often recommended after rehab. SLP/SS if indicated. Acute Cystitis  - Started on Cephalexin in ED on 12/4/23  - Continue Cephalexin 500 mg TID x 7 days, end date 12/12/23  - weekly labs in rehab   - monitor      Oral Thrush   - Moderate to severe white coating to tongue   - start Nystatin oral suspension swish and swallow 5 ml QID x 5 days, end date 12/10--- continue x 5 days; staff to please assist with brushing teeth and tongue      Physical Deconditioning  - PT/OT/ST      HTN  - Pt was running his BPs in ED; had a /83  - continue Losartan Potassium 100 mg daily   - continue Amlodipine 5 mg daily   - cards to follow in rehab      Hx of Stroke, pleasantly confused  - PT/OT/SLP eval and treat  - continue eliquis 5 mg daily   - continue ASA 81 mg daily   - continue Atorvastatin 20 mg hs  - Follow-up with PCP, neuro      DM type 2  - Last A1c value was 6.8% done 3/20/2023  - continue Dapagliflozin Propanediol 5 mg daily      Gout  - continue Colchicine 0.6 mg daily      Glaucoma   - continue Dorzolamide HCL-Timolol Maleate 22.3 - 6.8 mg/ml 1 drop to left eye BID      COPD   - continue Albuterol Sulfate 108 2 puffs q6h prn   - continue Fluticasone 1 puff daily   - monitor      Insomnia   - continue Trazodone 50 mg prn nightly, end date 12/19/23   Note to patient: The [facility] makes medical notes like these available to patients in the interest of transparency. However, this is a medical document intended as peer to peer communication. It is written in medical language and may contain abbreviations or verbiage that are unfamiliar. It may appear blunt or direct.  Medical documents are intended to carry relevant information, facts as evident, and the clinical opinion of the practitioner who signs the document. HENRRY Mclaughlin  NPI# [id number]  Janelle  Acute Team  12/11/2023    *Established patient; follow-up complex visit spent w/ patient and staff, including but not limited to/ reviewing present status, needs, abilities with disciplines, reviewing medical records, vital signs, labs, completing medication reconciliation and entering orders for continued care in [facility].

## 2023-12-13 ENCOUNTER — SNF VISIT (OUTPATIENT)
Facility: SKILLED NURSING FACILITY | Age: 78
End: 2023-12-13

## 2023-12-13 DIAGNOSIS — Z79.899 MEDICATION MANAGEMENT: ICD-10-CM

## 2023-12-13 DIAGNOSIS — Z74.1 REQUIRES ASSISTANCE WITH ACTIVITIES OF DAILY LIVING (ADL): ICD-10-CM

## 2023-12-13 DIAGNOSIS — L53.9 LOCALIZED ERYTHEMA: Primary | ICD-10-CM

## 2023-12-13 DIAGNOSIS — Z86.73 HISTORY OF CVA (CEREBROVASCULAR ACCIDENT): ICD-10-CM

## 2023-12-13 DIAGNOSIS — Z87.440 HISTORY OF ACUTE CYSTITIS: ICD-10-CM

## 2023-12-13 PROCEDURE — 99309 SBSQ NF CARE MODERATE MDM 30: CPT | Performed by: NURSE PRACTITIONER

## 2023-12-19 ENCOUNTER — SNF VISIT (OUTPATIENT)
Facility: SKILLED NURSING FACILITY | Age: 78
End: 2023-12-19

## 2023-12-19 DIAGNOSIS — I10 PRIMARY HYPERTENSION: ICD-10-CM

## 2023-12-19 DIAGNOSIS — U07.1 COVID-19 VIRUS INFECTION: ICD-10-CM

## 2023-12-19 DIAGNOSIS — N30.00 ACUTE CYSTITIS WITHOUT HEMATURIA: Primary | ICD-10-CM

## 2023-12-19 DIAGNOSIS — B37.0 ORAL THRUSH: ICD-10-CM

## 2023-12-19 NOTE — PROGRESS NOTES
Alan Ritter  : 1945  Age 69year old  male patient is admitted to [address] [place] [address] for rehabilitation and strengthening     [address] [place] [address] Admission  -  (ER Visit only)      Chief Complaints: physical deconditioning, weakness and fall     HPI  Pt brought to [place] [address] ED on 23 due to fall and inability to ambulate for the past week. Pt lives at home with his wife and daughter and it became too difficult for them to care of Mechelle James at home. He has past med hx significant for chronic kidney disease and CVA. CT of the head in ED was negative for any acute intracranial abnormality. Chest xray was negative and UA suspicious for cystitis. Pt was started on Cephalexin 500 mg TID x 7 days and discharged to [place] [address] for continuing nursing care and rehab. Patient seen in follow up, he is up in the nurses station. He has no complaints. Confused at baseline. VSS. Redness to right arm is improving. He states no shortness of breath or chest pain. No nausea or vomiting. VSS. Alert to self      ALLERGIES:  No Known Allergies    IMMUNIZATIONS  Immunization History   Administered Date(s) Administered    Covid-19 Vaccine Moderna 100 mcg/0.5 ml 2021, 2021    Covid-19 Vaccine Pfizer 30 mcg/0.3 ml 2021    FLU VAC High Dose 65 YRS & Older PRSV Free (27686) 10/19/2022        CODE STATUS:  DNR    ADVANCED CARE PLANNING TEAM: Will need family care plan    CURRENT MEDICATIONS - reviewed and updated on SNF EMR     SUBJECTIVE    Patient seen in follow up, he is up in the nurses station. He has no complaints. Confused at baseline. VSS. Redness to right arm is improving. He states no shortness of breath or chest pain. No nausea or vomiting. VSS.  Alert to self    PHYSICAL EXAM:  GENERAL HEALTH: well developed, appears well nourished, in no apparent distress; in WC  LINES, TUBES, DRAINS:  none  SKIN: no rashes, no suspicious lesions, pale, warm, dry; to exposed skin; nursing performs skin checks, notify if alt in skin integrity as done routinely  ABN SKIN: Right Forearm; round erythematous region, warm to touch; no drainage  Wound: none visualized; nurse to monitor skin integrity; notify PRN  EYES: conjunctiva normal, no drainage from eyes  HENT: normocephalic; normal nose, no nasal drainage, mucous membranes pink, moist; drooling   RESPIRATORY:  normal insp effort; on RA, in nad  ABDOMEN: no guarding  : Deferred  MUSCULOSKELETAL: gen weakness, continue therapies to improve strength, endurance and independence with ADLs as able  EXTREMITIES/VASCULAR: no cyanosis or edema noted   NEUROLOGIC: follows commands  PSYCHIATRIC: alert and oriented to self, pleasantly confused    SEE PLAN BELOW    Right Forearm; round erythematous region - improving  - Hydrocortisone 1% cream BID; notify if worsening  - Monitor  - Consider po treatment with antibiotics if clinically indicated     Acute Cystitis, treatment completed  - Cephalexin Rx'd in ED; eff 12/4/23  - Cephalexin 500 mg TID x 7 days, COMPLETED 12/12/23  - weekly labs in rehab   - monitor      Oral Thrush   - Moderate to severe white coating to tongue   - start Nystatin oral suspension swish and swallow 5 ml QID x 5 days, end date 12/10--- continue x 5 days; staff to please assist with brushing teeth and tongue      Physical Deconditioning  - PT/OT/ST      HTN  - continue Losartan Potassium 100 mg daily   - continue Amlodipine 5 mg daily   - cards to follow in rehab      Hx of Stroke, pleasantly confused  - PT/OT/SLP eval and treat  - continue eliquis 5 mg daily   - continue ASA 81 mg daily   - continue Atorvastatin 20 mg hs  - Follow-up with PCP, neuro      DM type 2  - Last A1c value was 6.8% done 3/20/2023  - continue Dapagliflozin Propanediol 5 mg daily      Gout  - continue Colchicine 0.6 mg daily      Glaucoma   - continue Dorzolamide HCL-Timolol Maleate 22.3 - 6.8 mg/ml 1 drop to left eye BID      COPD   - continue Albuterol Sulfate 108 2 puffs q6h prn   - continue Fluticasone 1 puff daily   - monitor      Insomnia   - continue Trazodone 50 mg prn nightly, end date 12/19/23     FOLLOW UP APPOINTMENTS  No future appointments. This is a 25 minute visit and greater than 50% of the time was spent counseling the patient and/or coordinating care. Note to patient: The [facility] makes medical notes like these available to patients in the interest of transparency. However, this is a medical document intended as peer to peer communication. It is written in medical language and may contain abbreviations or verbiage that are unfamiliar. It may appear blunt or direct. Medical documents are intended to carry relevant information, facts as evident, and the clinical opinion of the practitioner who signs the document.      Rere Ruff, APRN  12/19/23

## 2023-12-21 ENCOUNTER — SNF VISIT (OUTPATIENT)
Facility: SKILLED NURSING FACILITY | Age: 78
End: 2023-12-21

## 2023-12-21 DIAGNOSIS — U07.1 COVID-19 VIRUS INFECTION: ICD-10-CM

## 2023-12-21 DIAGNOSIS — Z86.73 HISTORY OF CVA (CEREBROVASCULAR ACCIDENT): ICD-10-CM

## 2023-12-21 DIAGNOSIS — B37.0 ORAL THRUSH: Primary | ICD-10-CM

## 2023-12-21 DIAGNOSIS — N30.00 ACUTE CYSTITIS WITHOUT HEMATURIA: ICD-10-CM

## 2023-12-21 DIAGNOSIS — E11.9 TYPE 2 DIABETES MELLITUS WITHOUT COMPLICATION, WITH LONG-TERM CURRENT USE OF INSULIN (HCC): ICD-10-CM

## 2023-12-21 DIAGNOSIS — Z79.4 TYPE 2 DIABETES MELLITUS WITHOUT COMPLICATION, WITH LONG-TERM CURRENT USE OF INSULIN (HCC): ICD-10-CM

## 2023-12-21 NOTE — PROGRESS NOTES
Conrad Juarez  : 1945  Age 69year old  male patient is admitted to [address] [place] [address] for rehabilitation and strengthening     [facility] Admission  -  (ER Visit only)      Chief Complaints: physical deconditioning, weakness and fall     HPI  Pt brought to [place] ED on 23 due to fall and inability to ambulate for the past week. Pt lives at home with his wife and daughter and it became too difficult for them to care of Lise Polanco at home. He has past med hx significant for chronic kidney disease and CVA. CT of the head in ED was negative for any acute intracranial abnormality. Chest xray was negative and UA suspicious for cystitis. Pt was started on Cephalexin 500 mg TID x 7 days and discharged to [facility] for continuing nursing care and rehab. Patient seen in follow up, more alert today and talking to staff. Very confused still. Up at nurses station. VSS. Alert to self. He verbalized no shortness of breath or chest pain. Not in any distress. Care plan with family next week to discuss discharge. ALLERGIES:  No Known Allergies    IMMUNIZATIONS  Immunization History   Administered Date(s) Administered    Covid-19 Vaccine Moderna 100 mcg/0.5 ml 2021, 2021    Covid-19 Vaccine Pfizer 30 mcg/0.3 ml 2021    FLU VAC High Dose 65 YRS & Older PRSV Free (45187) 10/19/2022        CODE STATUS:  DNR    ADVANCED CARE PLANNING TEAM: Will need family care plan    CURRENT MEDICATIONS - reviewed and updated on SNF EMR     SUBJECTIVE    Patient seen in follow up, more alert today and talking to staff. Very confused still. Up at nurses station. VSS. Alert to self. He verbalized no shortness of breath or chest pain. Not in any distress. Care plan with family next week to discuss discharge.      PHYSICAL EXAM:  GENERAL HEALTH: well developed, appears well nourished, in no apparent distress; in WC  LINES, TUBES, DRAINS:  none  SKIN: no rashes, no suspicious lesions, pale, warm, dry; to exposed skin; nursing performs skin checks, notify if alt in skin integrity as done routinely  ABN SKIN: Right Forearm; round erythematous region, warm to touch; no drainage  Wound: none visualized; nurse to monitor skin integrity; notify PRN  EYES: conjunctiva normal, no drainage from eyes  HENT: normocephalic; normal nose, no nasal drainage, mucous membranes pink, moist; drooling   RESPIRATORY:  normal insp effort; on RA, in nad  ABDOMEN: no guarding  : Deferred  MUSCULOSKELETAL: gen weakness, continue therapies to improve strength, endurance and independence with ADLs as able  EXTREMITIES/VASCULAR: no cyanosis or edema noted   NEUROLOGIC: follows commands  PSYCHIATRIC: alert and oriented to self, pleasantly confused     SEE PLAN BELOW     Right Forearm; round erythematous region - improving  - Hydrocortisone 1% cream BID; notify if worsening  - Monitor  - Consider po treatment with antibiotics if clinically indicated     Acute Cystitis, treatment completed  - Cephalexin Rx'd in ED; eff 12/4/23  - Cephalexin 500 mg TID x 7 days, COMPLETED 12/12/23  - weekly labs in rehab   - monitor      Oral Thrush   - Moderate to severe white coating to tongue   - start Nystatin oral suspension swish and swallow 5 ml QID x 5 days, end date 12/10--- continue x 5 days; staff to please assist with brushing teeth and tongue      Physical Deconditioning  - PT/OT/ST      HTN  - continue Losartan Potassium 100 mg daily   - continue Amlodipine 5 mg daily   - cards to follow in rehab      Hx of Stroke, pleasantly confused  - PT/OT/SLP eval and treat  - continue eliquis 5 mg daily   - continue ASA 81 mg daily   - continue Atorvastatin 20 mg hs  - Follow-up with PCP, neuro      DM type 2  - Last A1c value was 6.8% done 3/20/2023  - continue Dapagliflozin Propanediol 5 mg daily      Gout  - continue Colchicine 0.6 mg daily      Glaucoma   - continue Dorzolamide HCL-Timolol Maleate 22.3 - 6.8 mg/ml 1 drop to left eye BID      COPD   - continue Albuterol Sulfate 108 2 puffs q6h prn   - continue Fluticasone 1 puff daily   - monitor      Insomnia   - continue Trazodone 50 mg prn nightly, end date 12/19/23        FOLLOW UP APPOINTMENTS  No future appointments. This is a 25 minute visit and greater than 50% of the time was spent counseling the patient and/or coordinating care. Note to patient: The [facility] makes medical notes like these available to patients in the interest of transparency. However, this is a medical document intended as peer to peer communication. It is written in medical language and may contain abbreviations or verbiage that are unfamiliar. It may appear blunt or direct. Medical documents are intended to carry relevant information, facts as evident, and the clinical opinion of the practitioner who signs the document.      Hiren Keating, APRN  12/21/23

## 2023-12-27 ENCOUNTER — SNF VISIT (OUTPATIENT)
Facility: SKILLED NURSING FACILITY | Age: 78
End: 2023-12-27

## 2023-12-27 DIAGNOSIS — Z86.73 HISTORY OF CVA (CEREBROVASCULAR ACCIDENT): ICD-10-CM

## 2023-12-27 DIAGNOSIS — Z87.440 HISTORY OF CYSTITIS: ICD-10-CM

## 2023-12-27 DIAGNOSIS — Z74.1 REQUIRES ASSISTANCE WITH ACTIVITIES OF DAILY LIVING (ADL): ICD-10-CM

## 2023-12-27 DIAGNOSIS — Z79.899 MEDICATION MANAGEMENT: ICD-10-CM

## 2023-12-27 DIAGNOSIS — G47.9 DIFFICULTY SLEEPING: Primary | ICD-10-CM

## 2023-12-27 PROCEDURE — 99308 SBSQ NF CARE LOW MDM 20: CPT | Performed by: NURSE PRACTITIONER

## 2024-01-03 ENCOUNTER — SNF VISIT (OUTPATIENT)
Facility: SKILLED NURSING FACILITY | Age: 79
End: 2024-01-03

## 2024-01-03 DIAGNOSIS — J44.9 CHRONIC OBSTRUCTIVE PULMONARY DISEASE, UNSPECIFIED COPD TYPE (HCC): ICD-10-CM

## 2024-01-03 DIAGNOSIS — Z87.440 HISTORY OF UTI: ICD-10-CM

## 2024-01-03 DIAGNOSIS — Z79.899 MEDICATION MANAGEMENT: ICD-10-CM

## 2024-01-03 DIAGNOSIS — Z86.73 HISTORY OF CVA (CEREBROVASCULAR ACCIDENT): ICD-10-CM

## 2024-01-03 DIAGNOSIS — Z78.9 DECREASED ACTIVITIES OF DAILY LIVING (ADL): Primary | ICD-10-CM

## 2024-01-03 NOTE — PROGRESS NOTES
Cole Eric  : 1945  Age 78 year old  male patient is admitted to [facility] for rehabilitation and strengthening     [facility] Admission  -  (ER Visit only)   Chief Complaints: physical deconditioning, weakness and fall     HPI  Pt brought to [place] ED on 23 due to fall and inability to ambulate for the past week. Pt lives at home with his wife and daughter and it became too difficult for them to care of Cole at home. He has past med hx significant for chronic kidney disease and CVA. CT of the head in ED was negative for any acute intracranial abnormality. Chest xray was negative and UA suspicious for cystitis. Pt was started on Cephalexin 500 mg TID x 7 days and discharged to [facility] for continuing nursing care and rehab.      Patient seen today for follow-up visit.  Alert and oriented, conversing appropriately. Denies pain.  At nurse's station for supervision and safety.  Vital signs stable, patient remains afebrile.  No complaints or observation significant for cough, shortness of breath, chest pain, palpitations, nausea, vomiting, diarrhea, constipation.  No urinary changes or concerns reported by patient or staff.  No observations or c/o pain. May continue using the travel pillow for neck support PRN patient comfort. Arm rest to left armchair on  for support per therapy suggestion. Continue therapy as able, discharge planning with family and rehab team. Labs ordered for tomorrow, 24. Order entered into [facility] EMR.    ALLERGIES:  No Known Allergies    IMMUNIZATIONS  Immunization History   Administered Date(s) Administered    Covid-19 Vaccine Moderna 100 mcg/0.5 ml 2021, 2021    Covid-19 Vaccine Pfizer 30 mcg/0.3 ml 2021    FLU VAC High Dose 65 YRS & Older PRSV Free (81408) 10/19/2022      CODE STATUS:  DNR  ADVANCED CARE PLANNING TEAM: Will need family care plan  CURRENT MEDICATIONS - reviewed and updated on [facility] EMR     SUBJECTIVE:   Patient seen today  for follow-up visit.  Alert and oriented, conversing appropriately. Denies pain.  At nurse's station for supervision and safety.  Vital signs stable, patient remains afebrile.  No complaints or observation significant for cough, shortness of breath, chest pain, palpitations, nausea, vomiting, diarrhea, constipation.  No urinary changes or concerns reported by patient or staff.  No observations or c/o pain. May continue using the travel pillow for neck support PRN patient comfort. Arm rest to left armchair on WC for support per therapy suggestion. Continue therapy as able, discharge planning with family and rehab team. Labs ordered for tomorrow, 1/4/24. Order entered into [facility] EMR.    OBJECTIVE:    BP: 145/82 mmHg  1/3/2024 07:56  Temp:98.2 °F  1/3/2024 07:56  Pulse:93 bpm  1/3/2024 07:56  Weight:167 Lbs  1/3/2024 14:18  Resp:18 Breaths/min  1/3/2024 07:56  BS:100 mg/dL  1/2/2024 05:57  O2:96 %  1/3/2024 07:56  Pain:0  1/3/2024 07:56    PHYSICAL EXAM:  GENERAL HEALTH: well developed, in no apparent distress; in WC  LINES, TUBES, DRAINS:  none  SKIN: no rashes, no suspicious lesions, pale, warm, dry; to exposed skin; nursing performs skin checks, notify if alt in skin integrity as done routinely  ABN SKIN: Right Forearm- healed  Wound: none visualized; nurse to monitor skin integrity; notify PRN  EYES: conjunctiva normal, no drainage from eyes  HENT: normocephalic; normal nose, no nasal drainage, mucous membranes pink, moist; drooling   RESPIRATORY: CTA, no wheezing, normal insp effort; on RA, in nad  CARDIAC: S1, S2 present  ABDOMEN: round, soft, bowel sounds+, no guarding  : Deferred  MUSCULOSKELETAL: gen weakness, able to stand and scoot back in the WC at [facility]; continue therapies to improve strength, endurance and independence with ADLs as able  EXTREMITIES/VASCULAR: no cyanosis or edema noted   NEUROLOGIC: follows commands  PSYCHIATRIC: alert and oriented to self, pleasantly confused     SEE PLAN BELOW  Labs  1/4/24     Physical Deconditioning  - PT/OT/SLP Eval and treat.     HTN  - CTM  - Continue Losartan Potassium 100 mg daily   - continue Amlodipine 5 mg daily   - cards to follow in rehab      Hx of Stroke, pleasantly confused  - PT/OT/SLP eval and treat  - Arm rest to left arm on WC, inspect and protect bony prominences  - continue eliquis 5 mg daily   - continue ASA 81 mg daily   - continue Atorvastatin 20 mg hs  - Follow-up with PCP, neuro      DM type 2  - Last A1c value was 6.8% done 3/20/2023  - Accu-check daily; 107 today  - continue Dapagliflozin Propanediol 5 mg daily      Gout  - continue Colchicine 0.6 mg daily      Glaucoma   - continue Dorzolamide HCL-Timolol Maleate 22.3 - 6.8 mg/ml 1 drop to left eye BID      COPD   - continue Albuterol Sulfate 108 2 puffs q6h prn   - continue Fluticasone 1 puff daily   - monitor      Insomnia   - continue Trazodone 50 mg prn nightly, Rx refill    Right Forearm; round erythematous region -RESOLVED     Acute Cystitis -RESOLVED  - Keflex treatment completed 12/12/23     Oral Thrush -RESOLVED  - Moderate to severe white coating to tongue   - start Nystatin oral suspension swish and swallow 5 ml QID x 5 days, end date 12/10--- continue x 5 days; staff to please assist with brushing teeth and tongue     This is a 25 minute visit and greater than 50% of the time was spent counseling the patient and/or coordinating care.    Note to patient: The [employer] Act makes medical notes like these available to patients in the interest of transparency. However, this is a medical document intended as peer to peer communication. It is written in medical language and may contain abbreviations or verbiage that are unfamiliar. It may appear blunt or direct. Medical documents are intended to carry relevant information, facts as evident, and the clinical opinion of the practitioner who signs the document.     Alyssa Sood, APRN  1/3/24

## 2024-01-10 ENCOUNTER — SNF VISIT (OUTPATIENT)
Facility: SKILLED NURSING FACILITY | Age: 79
End: 2024-01-10

## 2024-01-10 DIAGNOSIS — Z78.9 DECREASED ACTIVITIES OF DAILY LIVING (ADL): ICD-10-CM

## 2024-01-10 DIAGNOSIS — Z86.73 HISTORY OF CVA (CEREBROVASCULAR ACCIDENT): Primary | ICD-10-CM

## 2024-01-10 DIAGNOSIS — I10 PRIMARY HYPERTENSION: ICD-10-CM

## 2024-01-10 DIAGNOSIS — M25.522 LEFT ELBOW PAIN: ICD-10-CM

## 2024-01-10 DIAGNOSIS — Z79.899 MEDICATION MANAGEMENT: ICD-10-CM

## 2024-01-10 DIAGNOSIS — Z91.89 AT HIGH RISK FOR SKIN BREAKDOWN: ICD-10-CM

## 2024-01-10 PROCEDURE — 99308 SBSQ NF CARE LOW MDM 20: CPT | Performed by: NURSE PRACTITIONER

## 2024-01-10 NOTE — PROGRESS NOTES
Cole Eric  : 1945  Age 78 year old  male patient is admitted to [facility] for rehabilitation and strengthening     [facility] Admission  -  (ER Visit only)   Chief Complaints: physical deconditioning, weakness and fall     HPI  Pt brought to [place] ED on 23 due to fall and inability to ambulate for the past week. Pt lives at home with his wife and daughter and it became too difficult for them to care of Cole at home. He has past med hx significant for chronic kidney disease and CVA. CT of the head in ED was negative for any acute intracranial abnormality. Chest xray was negative and UA suspicious for cystitis. Pt was started on Cephalexin 500 mg TID x 7 days and discharged to [facility] for continuing nursing care and rehab.      Patient seen today for follow-up visit.  Alert and oriented, conversing appropriately. Pt reports left arm, specifically left elbow pain. Arm and elbow examined, skin CDI. Spoke with staff nurse about continuing to monitor, administer Tylenol now and PRN, pad the bony prominence. -pillow.  will connect with therapy staff about possible cushion/ padding for the armrest as well.  Pt remains at the nurse's station for supervision and safety.  Encouraged patient to eat and drink more. Vital signs stable, patient remains afebrile.  BP reviewed; Dr. Cedillo rec to continue present Amlodipine dosing. No complaints or observation significant for cough, shortness of breath, chest pain, palpitations, nausea, vomiting, diarrhea, constipation.  No urinary changes or concerns reported by patient or staff.  Uses the travel pillow intermittently for neck support -PRN patient comfort. Continue therapy as able, discharge planning with family and rehab team. Labs were reportedly refused by the patient, recommend labs in am. Nurse Linda aware. Discharge plan is presently for patient to move into [facility], on/around 24. No further  questions/concerns per patient, staff or nurse Linda. No family present at this time.    ALLERGIES:  No Known Allergies    IMMUNIZATIONS  Immunization History   Administered Date(s) Administered    Covid-19 Vaccine Moderna 100 mcg/0.5 ml 08/26/2021, 09/23/2021    Covid-19 Vaccine Pfizer 30 mcg/0.3 ml 08/26/2021    FLU VAC High Dose 65 YRS & Older PRSV Free (22932) 10/19/2022      CODE STATUS:  DNR  ADVANCED CARE PLANNING TEAM: Will need family care plan  CURRENT MEDICATIONS - reviewed and updated on SNF EMR     SUBJECTIVE:   Patient seen today for follow-up visit.  Alert and oriented, conversing appropriately. Pt reports left arm, specifically left elbow pain. Arm and elbow examined, skin CDI. Spoke with staff nurse about continuing to monitor, administer Tylenol now and PRN, pad the bony prominence. -pillow.  will connect with therapy staff about possible cushion/ padding for the armrest as well.  Pt remains at the nurse's station for supervision and safety.  Encouraged patient to eat and drink more. Vital signs stable, patient remains afebrile.  BP reviewed; Dr. Cedillo rec to continue present Amlodipine dosing. No complaints or observation significant for cough, shortness of breath, chest pain, palpitations, nausea, vomiting, diarrhea, constipation.  No urinary changes or concerns reported by patient or staff.  Uses the travel pillow intermittently for neck support -PRN patient comfort. Continue therapy as able, discharge planning with family and rehab team. Labs were reportedly refused by the patient, recommend labs in am. Nurse Linda aware. Discharge plan is presently for patient to move into [facility], on/around 1/19/24. No further questions/concerns per patient, staff or nurse Linda. No family present at this time.    OBJECTIVE:    BP: 158/91 mmHg  1/10/2024 08:12  Temp:98 °F  1/10/2024 08:12  Pulse:87 bpm  1/10/2024 08:12  Weight:167 Lbs  1/8/2024 15:04  Resp:18  Breaths/min  1/10/2024 08:12  BS:105 mg/dL  1/10/2024 05:53  O2:95 %  1/10/2024 08:12  Pain:0  1/10/2024 08:12    BPs  158/91, 144/75, 140/69, 144/68, 154/77, 143/79, 156/77, 145/78, 134/72, 162/75, 165/85    PHYSICAL EXAM:  GENERAL HEALTH: well developed, in no apparent distress; in WC  LINES, TUBES, DRAINS:  none  SKIN: no rashes, no suspicious lesions, pale, warm, dry; to exposed skin; nursing performs skin checks, notify if alt in skin integrity as done routinely  Wound: none visualized; nurse to monitor skin integrity; notify PRN  EYES: conjunctiva normal, no drainage from eyes  HENT: normocephalic; normal nose, no nasal drainage, mucous membranes pink, moist; drooling-cleaned  RESPIRATORY: CTA ant/posterior chest, no wheezing, normal insp effort; on RA, in nad  CARDIAC: S1, S2 present  ABDOMEN: round, soft, bowel sounds+, no guarding  : Deferred  MUSCULOSKELETAL: gen weakness, pt asked and able to stand and scoot back in the WC at [facility], slides/scoots forward while in the WC; continue therapies to improve strength, endurance and independence with ADLs as able  EXTREMITIES/VASCULAR: no cyanosis or edema noted   NEUROLOGIC: follows commands, conversing appropriately  PSYCHIATRIC: alert and oriented to self, pleasant     SEE PLAN BELOW  Labs refused by patient, rec re-attempt 1/11/24    Physical Deconditioning  - PT/OT/SLP Eval and treat.     HTN  - CTM  - Continue Losartan Potassium 100 mg daily   - continue Amlodipine 5 mg daily   - cards to follow in rehab     Discomfort left elbow; At risk for skin breakdown, alt in skin integrity- left elbow  - Continue to elevate  - Inspect and protect bony prominences- pillow, alt splint if available  - Tylenol now and PRN     Hx of Stroke  - PT/OT/SLP eval and treat  - Arm rest to left arm on WC, inspect and protect bony prominences  - continue eliquis 5 mg daily   - continue ASA 81 mg daily   - continue Atorvastatin 20 mg hs  - Follow-up with PCP, neuro      DM type 2  -  Last A1c value was 6.8% done 3/20/2023  - Accu-check daily; 105 today  - continue Dapagliflozin Propanediol 5 mg daily      Gout  - continue Colchicine 0.6 mg daily      Glaucoma   - continue Dorzolamide HCL-Timolol Maleate 22.3 - 6.8 mg/ml 1 drop to left eye BID      COPD   - continue Albuterol Sulfate 108 2 puffs q6h prn   - continue Fluticasone 1 puff daily   - monitor      Insomnia   - continue Trazodone 50 mg prn nightly, Rx refill    Right Forearm; round erythematous region -RESOLVED    This is a 25 minute visit and greater than 50% of the time was spent counseling the patient and/or coordinating care.    Note to patient: The 21st Century Cures Act makes medical notes like these available to patients in the interest of transparency. However, this is a medical document intended as peer to peer communication. It is written in medical language and may contain abbreviations or verbiage that are unfamiliar. It may appear blunt or direct. Medical documents are intended to carry relevant information, facts as evident, and the clinical opinion of the practitioner who signs the document.     Alyssa Sood, APRN  1/10/24

## 2024-01-16 ENCOUNTER — SNF VISIT (OUTPATIENT)
Facility: SKILLED NURSING FACILITY | Age: 79
End: 2024-01-16

## 2024-01-16 DIAGNOSIS — E11.9 TYPE 2 DIABETES MELLITUS WITHOUT COMPLICATION, WITH LONG-TERM CURRENT USE OF INSULIN (HCC): ICD-10-CM

## 2024-01-16 DIAGNOSIS — Z79.4 TYPE 2 DIABETES MELLITUS WITHOUT COMPLICATION, WITH LONG-TERM CURRENT USE OF INSULIN (HCC): ICD-10-CM

## 2024-01-16 DIAGNOSIS — I10 PRIMARY HYPERTENSION: ICD-10-CM

## 2024-01-16 DIAGNOSIS — G93.40 ENCEPHALOPATHY: ICD-10-CM

## 2024-01-16 DIAGNOSIS — M1A.9XX0 CHRONIC GOUT WITHOUT TOPHUS, UNSPECIFIED CAUSE, UNSPECIFIED SITE: ICD-10-CM

## 2024-01-16 DIAGNOSIS — Z86.73 HISTORY OF CVA (CEREBROVASCULAR ACCIDENT): Primary | ICD-10-CM

## 2024-01-16 PROCEDURE — 99309 SBSQ NF CARE MODERATE MDM 30: CPT | Performed by: NURSE PRACTITIONER

## 2024-01-16 NOTE — PROGRESS NOTES
Cole Eric  : 1945  Age 78 year old  male patient is admitted to [facility] for rehabilitation and strengthening     [facility] Admission  -  (ER Visit only)   Chief Complaints: physical deconditioning, weakness and fall     HPI  Pt brought to [place] ED on 23 due to fall and inability to ambulate for the past week. Pt lives at home with his wife and daughter and it became too difficult for them to care of Cole at home. He has past med hx significant for chronic kidney disease and CVA. CT of the head in ED was negative for any acute intracranial abnormality. Chest xray was negative and UA suspicious for cystitis. Pt was started on Cephalexin 500 mg TID x 7 days and discharged to [facility] for continuing nursing care and rehab.     Patient seen in follow up, up at nurses station. Alert and oriented and talking. VSS, currently no complaints but recently had left arm pain. No shortness of breath or chest pain. No nausea. Eating well.     ALLERGIES:  No Known Allergies    IMMUNIZATIONS  Immunization History   Administered Date(s) Administered    Covid-19 Vaccine Moderna 100 mcg/0.5 ml 2021, 2021    Covid-19 Vaccine Pfizer 30 mcg/0.3 ml 2021    FLU VAC High Dose 65 YRS & Older PRSV Free ([id number]) 10/19/2022        CODE STATUS:  DNR    ADVANCED CARE PLANNING TEAM: Discharge to LTC on     CURRENT MEDICATIONS - reviewed and updated on SNF EMR     SUBJECTIVE    Patient seen in follow up, up at nurses station. Alert and oriented and talking. VSS, currently no complaints but recently had left arm pain. No shortness of breath or chest pain. No nausea. Eating well.     PHYSICAL EXAM:  GENERAL HEALTH: well developed, in no apparent distress; in WC  LINES, TUBES, DRAINS:  none  SKIN: no rashes, no suspicious lesions, pale, warm, dry; to exposed skin; nursing performs skin checks, notify if alt in skin integrity as done routinely  Wound: none visualized; nurse to monitor skin  integrity; notify PRN  EYES: conjunctiva normal, no drainage from eyes  HENT: normocephalic; normal nose, no nasal drainage, mucous membranes pink, moist; drooling-cleaned  RESPIRATORY: CTA ant/posterior chest, no wheezing, normal insp effort; on RA, in nad  CARDIAC: S1, S2 present  ABDOMEN: round, soft, bowel sounds+, no guarding  : Deferred  MUSCULOSKELETAL: gen weakness, pt asked and able to stand and scoot back in the WC at [facility], slides/scoots forward while in the WC; continue therapies to improve strength, endurance and independence with ADLs as able  EXTREMITIES/VASCULAR: no cyanosis or edema noted   NEUROLOGIC: follows commands, conversing appropriately  PSYCHIATRIC: alert and oriented to self, pleasant    SEE PLAN BELOW  Physical Deconditioning  - PT/OT/SLP Eval and treat.     HTN  - CTM  - Continue Losartan Potassium 100 mg daily   - continue Amlodipine 5 mg daily   - cards to follow in rehab      Discomfort left elbow; At risk for skin breakdown, alt in skin integrity- left elbow  - Continue to elevate  - Inspect and protect bony prominences- pillow, alt splint if available  - Tylenol now and PRN     Hx of Stroke  - PT/OT/SLP eval and treat  - Arm rest to left arm on WC, inspect and protect bony prominences  - continue eliquis 5 mg daily   - continue ASA 81 mg daily   - continue Atorvastatin 20 mg hs  - Follow-up with PCP, neuro      DM type 2  - Last A1c value was 6.8% done 3/20/2023  - Accu-check daily; 105 today  - continue Dapagliflozin Propanediol 5 mg daily      Gout  - continue Colchicine 0.6 mg daily      Glaucoma   - continue Dorzolamide HCL-Timolol Maleate 22.3 - 6.8 mg/ml 1 drop to left eye BID      COPD   - continue Albuterol Sulfate 108 2 puffs q6h prn   - continue Fluticasone 1 puff daily   - monitor      Insomnia   - continue Trazodone 50 mg prn nightly, Rx refill     Right Forearm; round erythematous region -RESOLVED      FOLLOW UP APPOINTMENTS  No future appointments.     This is a 25  minute visit and greater than 50% of the time was spent counseling the patient and/or coordinating care.    Note to patient: The 21st Century Cures Act makes medical notes like these available to patients in the interest of transparency. However, this is a medical document intended as peer to peer communication. It is written in medical language and may contain abbreviations or verbiage that are unfamiliar. It may appear blunt or direct. Medical documents are intended to carry relevant information, facts as evident, and the clinical opinion of the practitioner who signs the document.     Doris Pierce, APRN  01/16/24

## 2024-01-24 ENCOUNTER — SNF VISIT (OUTPATIENT)
Facility: SKILLED NURSING FACILITY | Age: 79
End: 2024-01-24

## 2024-01-24 DIAGNOSIS — R53.1 LEFT-SIDED WEAKNESS: ICD-10-CM

## 2024-01-24 DIAGNOSIS — Z86.73 HISTORY OF CVA (CEREBROVASCULAR ACCIDENT): Primary | ICD-10-CM

## 2024-01-24 DIAGNOSIS — Z74.1 REQUIRES ASSISTANCE WITH ACTIVITIES OF DAILY LIVING (ADL): ICD-10-CM

## 2024-01-24 DIAGNOSIS — Z79.899 MEDICATION MANAGEMENT: ICD-10-CM

## 2024-01-24 PROCEDURE — 99309 SBSQ NF CARE MODERATE MDM 30: CPT | Performed by: NURSE PRACTITIONER

## 2024-01-24 NOTE — PROGRESS NOTES
Cole Eric  : 1945  Age 78 year old  male is being discharged from: [facility], subacute rehab.    DISCHARGE SUMMARY     Discharge date from [facility]: Anticipated 24  Discharge Plan:  Moving into long-term care  *Follow-up with PCP is recommended within 7-10 days following discharge from rehab.   *Follow-up with specialists as recommended/needed.  *[facility] patients may call the  at [phone] to schedule all follow-up appointments in just one call.     SUBJECTIVE/ ROS:     Patient seen in follow up, up at nurses station. Alert and oriented and talking. Tells me he's okay, is feeding himself lunch. No pain per patient. VSS, on RA. No shortness of breath or chest pain. No nausea. Eating well.     No observations or c/o SOB/ESCALERA/cough  No observations or c/o chest pain/palpitation/dizziness  No observations or c/o abdominal discomfort/nausea/vomiting/diarrhea/constipation  No observations or c/o urinary changes-- urgency/frequency/dysuria/hematuria  No concerns per patient, staff or nurse Linda. Discharge planning with [facility] team; pt will be transitioning into long-term care.    OBJECTIVE:     Vital signs: reviewed in [facility] EMR    BP: 163/79 mmHg  2024 08:06  Temp:98.3 °F  2024 08:06  Pulse:77 bpm  2024 08:06  Weight:159 Lbs  2024 13:16  Resp:18 Breaths/min  2024 08:06  BS:118 mg/dL  2024 06:48  O2:95 %  2024 08:06  Pain:1  2024 08:06    Current medications: reviewed in [facility] EMR  Labs: reviewed in [facility] EMR    ASSESSMENT/ PHYSICAL EXAM:     GENERAL HEALTH: well developed, in no apparent distress; in WC  LINES, TUBES, DRAINS:  none  SKIN: pale, warm, dry; no rashes, no suspicious lesions to exposed skin; nursing performs skin checks, notify if alt in skin integrity as done routinely  Wound: none visualized  EYES: conjunctiva normal, no drainage from eyes  HENT: normocephalic; normal nose, no nasal drainage  RESPIRATORY: CTA ant/posterior chest, no wheezing,  normal insp effort; on RA, in nad  CARDIAC: S1, S2 present  ABDOMEN: round, soft, bowel sounds+, no guarding  : Deferred  MUSCULOSKELETAL: not observed walking presently; left sided, left arm weakness s/p CVA  EXTREMITIES/VASCULAR: no cyanosis or edema noted   NEUROLOGIC: follows commands, conversing appropriately  PSYCHIATRIC: alert and oriented to self, pleasant    MEDICAL DECISION MAKING/ PLAN OF CARE UPON DISCHARGE:     Physical Deconditioning/Weakness; hx CVA  - LTC for physical assistance and support with ADLs     HTN  - CTM  - Continue Losartan Potassium 100 mg daily   - continue Amlodipine 5 mg daily      Discomfort left elbow; At risk for skin breakdown, alt in skin integrity- left elbow  - Continue to elevate  - Inspect and protect bony prominences- pillow, alt splint if available  - Tylenol PRN     Hx of Stroke  - Therapy as able  - Arm rest to left arm on WC, inspect and protect bony prominences  - continue eliquis 5 mg daily   - continue ASA 81 mg daily   - continue Atorvastatin 20 mg hs  - Follow-up with PCP, neuro      DM type 2  - Last A1c value was 6.8% done 3/20/2023  - Accu-check daily  - continue Dapagliflozin Propanediol 5 mg daily      Gout  - continue Colchicine 0.6 mg daily      Glaucoma   - continue Dorzolamide HCL-Timolol Maleate 22.3 - 6.8 mg/ml 1 drop to left eye BID      COPD   - continue Albuterol Sulfate 108 2 puffs q6h prn   - continue Fluticasone 1 puff daily      Insomnia   - continue Trazodone 50 mg prn nightly, Rx refill    Note to patient: The 21st Century Cures Act makes medical notes like these available to patients in the interest of transparency. However, this is a medical document intended as peer to peer communication. It is written in medical language and may contain abbreviations or verbiage that are unfamiliar. It may appear blunt or direct. Medical documents are intended to carry relevant information, facts as evident and the clinical opinion of the practitioner who  signs the document.    *Greater than 30 minutes spent today with patient and staff, including but not limited to/ coordination of care in preparation for discharge; reviewing present status, progress with therapy, discharge needs--DME and home health, reviewing medical records, labs, completing medication reconciliation, providing prescriptions and guiding referrals to PCP/specialists for continued medical care and oversight of ongoing needs.    HENRRY Hill ([place])  NPI# [id number]  [facility]  Post Acute Team  1/24/2024

## 2024-07-17 ENCOUNTER — APPOINTMENT (OUTPATIENT)
Dept: GENERAL RADIOLOGY | Facility: HOSPITAL | Age: 79
End: 2024-07-17
Payer: MEDICARE

## 2024-07-17 ENCOUNTER — HOSPITAL ENCOUNTER (INPATIENT)
Facility: HOSPITAL | Age: 79
LOS: 5 days | Discharge: HOSPICE | End: 2024-07-23
Attending: EMERGENCY MEDICINE | Admitting: HOSPITALIST
Payer: MEDICARE

## 2024-07-17 DIAGNOSIS — D72.829 LEUKOCYTOSIS, UNSPECIFIED TYPE: ICD-10-CM

## 2024-07-17 DIAGNOSIS — N30.00 ACUTE CYSTITIS WITHOUT HEMATURIA: ICD-10-CM

## 2024-07-17 DIAGNOSIS — N17.9 AKI (ACUTE KIDNEY INJURY) (HCC): Primary | ICD-10-CM

## 2024-07-17 DIAGNOSIS — E86.0 DEHYDRATION: ICD-10-CM

## 2024-07-17 LAB
ALBUMIN SERPL-MCNC: 3.7 G/DL (ref 3.2–4.8)
ALBUMIN/GLOB SERPL: 1.3 {RATIO} (ref 1–2)
ALP LIVER SERPL-CCNC: 114 U/L
ALT SERPL-CCNC: 24 U/L
ANION GAP SERPL CALC-SCNC: 13 MMOL/L (ref 0–18)
AST SERPL-CCNC: 19 U/L (ref ?–34)
BASOPHILS # BLD AUTO: 0.03 X10(3) UL (ref 0–0.2)
BASOPHILS NFR BLD AUTO: 0.2 %
BILIRUB SERPL-MCNC: 0.3 MG/DL (ref 0.2–1.1)
BILIRUB UR QL CFM: NEGATIVE
BUN BLD-MCNC: 100 MG/DL (ref 9–23)
BUN/CREAT SERPL: 22.8 (ref 10–20)
CALCIUM BLD-MCNC: 8.4 MG/DL (ref 8.7–10.4)
CHLORIDE SERPL-SCNC: 117 MMOL/L (ref 98–112)
CO2 SERPL-SCNC: 16 MMOL/L (ref 21–32)
CREAT BLD-MCNC: 4.39 MG/DL
DEPRECATED RDW RBC AUTO: 51.9 FL (ref 35.1–46.3)
EGFRCR SERPLBLD CKD-EPI 2021: 13 ML/MIN/1.73M2 (ref 60–?)
EOSINOPHIL # BLD AUTO: 0.46 X10(3) UL (ref 0–0.7)
EOSINOPHIL NFR BLD AUTO: 3.2 %
ERYTHROCYTE [DISTWIDTH] IN BLOOD BY AUTOMATED COUNT: 14.8 % (ref 11–15)
GLOBULIN PLAS-MCNC: 2.8 G/DL (ref 2–3.5)
GLUCOSE BLD-MCNC: 90 MG/DL (ref 70–99)
HCT VFR BLD AUTO: 29.8 %
HGB BLD-MCNC: 9.5 G/DL
IMM GRANULOCYTES # BLD AUTO: 0.1 X10(3) UL (ref 0–1)
IMM GRANULOCYTES NFR BLD: 0.7 %
IRON SATN MFR SERPL: 8 %
IRON SERPL-MCNC: 18 UG/DL
LYMPHOCYTES # BLD AUTO: 1.05 X10(3) UL (ref 1–4)
LYMPHOCYTES NFR BLD AUTO: 7.3 %
MAGNESIUM SERPL-MCNC: 1.8 MG/DL (ref 1.6–2.6)
MCH RBC QN AUTO: 30.6 PG (ref 26–34)
MCHC RBC AUTO-ENTMCNC: 31.9 G/DL (ref 31–37)
MCV RBC AUTO: 96.1 FL
MONOCYTES # BLD AUTO: 1.04 X10(3) UL (ref 0.1–1)
MONOCYTES NFR BLD AUTO: 7.3 %
NEUTROPHILS # BLD AUTO: 11.61 X10 (3) UL (ref 1.5–7.7)
NEUTROPHILS # BLD AUTO: 11.61 X10(3) UL (ref 1.5–7.7)
NEUTROPHILS NFR BLD AUTO: 81.3 %
OSMOLALITY SERPL CALC.SUM OF ELEC: 333 MOSM/KG (ref 275–295)
PLATELET # BLD AUTO: 162 10(3)UL (ref 150–450)
POTASSIUM SERPL-SCNC: 4.3 MMOL/L (ref 3.5–5.1)
PROT SERPL-MCNC: 6.5 G/DL (ref 5.7–8.2)
RBC # BLD AUTO: 3.1 X10(6)UL
RBC #/AREA URNS AUTO: >10 /HPF
RBC #/AREA URNS AUTO: >10 /HPF
SODIUM SERPL-SCNC: 146 MMOL/L (ref 136–145)
SP GR UR STRIP: 1.02 (ref 1–1.03)
TIBC SERPL-MCNC: 221 UG/DL (ref 250–425)
TRANSFERRIN SERPL-MCNC: 148 MG/DL (ref 215–365)
VIT B12 SERPL-MCNC: 529 PG/ML (ref 211–911)
WBC # BLD AUTO: 14.3 X10(3) UL (ref 4–11)
WBC #/AREA URNS AUTO: >50 /HPF
WBC #/AREA URNS AUTO: >50 /HPF
WBC CLUMPS UR QL AUTO: PRESENT /HPF
WBC CLUMPS UR QL AUTO: PRESENT /HPF

## 2024-07-17 PROCEDURE — 71045 X-RAY EXAM CHEST 1 VIEW: CPT | Performed by: EMERGENCY MEDICINE

## 2024-07-17 NOTE — ED PROVIDER NOTES
Patient Seen in: [facility] Emergency Department    History     Chief Complaint   Patient presents with    Abnormal Result       HPI    78-year-old male presents from [facility] for abnormal labs.  Patient had elevated BUN/creatinine as well as elevated potassium on labs drawn yesterday.  Patient is past medical history of diabetes, dementia, UTIs, hypertension.    History reviewed.   Past Medical History:    Back pain    Back problem    BPH (benign prostatic hyperplasia)    bph    Chest pain    chest pain on exertion    Deep vein thrombosis (HCC)    Dementia (HCC)    Diabetes (HCC)    Fatigue    chronic fatigue    GERD (gastroesophageal reflux disease)    gerd    Gout    chronic gout    High cholesterol    History of stomach ulcers    HTN    Incontinence    Neuropathic pain    neuropathic pain of both legs    PVD (peripheral vascular disease) (HCC)    pvd    Stroke (HCC)       History reviewed.   Past Surgical History:   Procedure Laterality Date    Back surgery      Cath bare metal stent (bms)           Medications :  (Not in a hospital admission)       Family History   Problem Relation Age of Onset    Heart Disorder Father     Cancer Father     Heart Disorder Mother     Cancer Mother         uterine    Cancer Sister     Diabetes Sister     Cancer Brother     Cancer Brother        Smoking Status:   Social History     Socioeconomic History    Marital status:    Tobacco Use    Smoking status: Former     Current packs/day: 0.00     Average packs/day: 1 pack/day for 41.0 years (41.0 ttl pk-yrs)     Types: Cigarettes     Start date: 1975     Quit date: 2016     Years since quittin.5    Smokeless tobacco: Former     Quit date: 2016   Vaping Use    Vaping status: Unknown   Substance and Sexual Activity    Alcohol use: Yes     Comment: rare     Drug use: Never   Other Topics Concern    Caffeine Concern Yes     Comment: coffee 1 cup daily    Exercise No    Seat Belt No    Special  Diet No    Stress Concern No    Weight Concern No       ROS  All pertinent positives for the review of systems are mentioned in the HPI  All other organ systems are reviewed and are negative.    Constitutional and vital signs reviewed.      Social History and Family History elements reviewed from today, pertinent positives to the presenting problem noted.    Physical Exam     ED Triage Vitals   BP 07/17/24 1545 111/74   Pulse 07/17/24 1545 87   Resp 07/17/24 1545 20   Temp 07/17/24 1549 98.1 °F (36.7 °C)   Temp src 07/17/24 1549 Oral   SpO2 07/17/24 1545 98 %   O2 Device 07/17/24 1545 None (Room air)       All measures to prevent infection transmission during my interaction with the patient were taken. The patient was already wearing a droplet mask on my arrival to the room. Personal protective equipment including droplet mask, eye protection, and gloves were worn throughout the duration of the exam.  Handwashing was performed prior to and after the exam.  Stethoscope and any equipment used during my examination was cleaned with super sani-cloth germicidal wipes following the exam.     Physical Exam  Vitals and nursing note reviewed.   Constitutional:       Appearance: He is well-developed.   HENT:      Head: Normocephalic and atraumatic.      Right Ear: External ear normal.      Left Ear: External ear normal.      Nose: Nose normal.   Eyes:      Conjunctiva/sclera: Conjunctivae normal.      Pupils: Pupils are equal, round, and reactive to light.   Cardiovascular:      Rate and Rhythm: Normal rate and regular rhythm.      Heart sounds: Normal heart sounds.   Pulmonary:      Effort: Pulmonary effort is normal.      Breath sounds: Normal breath sounds.   Abdominal:      General: Bowel sounds are normal.      Palpations: Abdomen is soft.   Musculoskeletal:         General: Normal range of motion.      Cervical back: Normal range of motion and neck supple.   Skin:     General: Skin is warm and dry.   Neurological:       General: No focal deficit present.      Mental Status: He is alert. Mental status is at baseline.      Motor: Weakness present.      Deep Tendon Reflexes: Reflexes are normal and symmetric.   Psychiatric:         Behavior: Behavior normal.         Thought Content: Thought content normal.         Judgment: Judgment normal.         ED Course        Labs Reviewed   COMP METABOLIC PANEL (14) - Abnormal; Notable for the following components:       Result Value    Sodium 146 (*)     Chloride 117 (*)     CO2 16.0 (*)      (*)     Creatinine 4.39 (*)     BUN/CREA Ratio 22.8 (*)     Calcium, Total 8.4 (*)     Calculated Osmolality 333 (*)     eGFR-Cr 13 (*)     All other components within normal limits   URINALYSIS WITH CULTURE REFLEX - Abnormal; Notable for the following components:    Urine Color Brown (*)     Clarity Urine Cloudy (*)     WBC Urine >50 (*)     RBC Urine >10 (*)     Bacteria Urine 3+ (*)     Triple PO4 Crystals Few (*)     WBC Clump Present (*)     All other components within normal limits   UA MICROSCOPIC ONLY, URINE - Abnormal; Notable for the following components:    WBC Urine >50 (*)     RBC Urine >10 (*)     Bacteria Urine 3+ (*)     Triple PO4 Crystals Few (*)     WBC Clump Present (*)     All other components within normal limits   CBC W/ DIFFERENTIAL - Abnormal; Notable for the following components:    WBC 14.3 (*)     RBC 3.10 (*)     HGB 9.5 (*)     HCT 29.8 (*)     RDW-SD 51.9 (*)     Neutrophil Absolute Prelim 11.61 (*)     Neutrophil Absolute 11.61 (*)     Monocyte Absolute 1.04 (*)     All other components within normal limits   MAGNESIUM - Normal   ICTOTEST - Normal   CBC WITH DIFFERENTIAL WITH PLATELET    Narrative:     The following orders were created for panel order CBC With Differential With Platelet.                  Procedure                               Abnormality         Status                                     ---------                               -----------          ------                                     CBC W/ DIFFERENTIAL[[id number]]          Abnormal            Final result                                                 Please view results for these tests on the individual orders.   ED/MRSA SCREEN BY PCR-CC   URINE CULTURE, ROUTINE     EKG    Rate, intervals and axes as noted on EKG Report.  Rate: 85  Rhythm: Sinus Rhythm  Reading: LVH, PVCs, sinus rhythm, unchanged from previous EKG on 12/4/2023             Imaging Results Available and Reviewed while in ED: No results found.  ED Medications Administered:   Medications   sodium chloride 0.9 % IV bolus 1,000 mL (1,000 mL Intravenous New Bag 7/17/24 1748)   cefTRIAXone (Rocephin) 2 g in sodium chloride 0.9% 100 mL IVPB-ADDV (has no administration in time range)         MDM     Vitals:    07/17/24 1645 07/17/24 1715 07/17/24 1745 07/17/24 1800   BP: 104/66 104/76 (!) 112/99 112/79   Pulse: 66 83 85 73   Resp: 16 16 18 18   Temp:       TempSrc:       SpO2: 98% 99% 95% 96%   Weight:       Height:         *I personally reviewed and interpreted all ED vitals.  I also personally reviewed all labs and imaging if ordered    Pulse Ox: 98%, Room air, Normal     Monitor Interpretation:   normal sinus rhythm    Differential Diagnosis/ Diagnostic Considerations: Hyperkalemia, dehydration, UTI, leukocytosis    Medical Record Review: I personally reviewed available prior medical records for any recent pertinent discharge summaries, testing, and procedures and reviewed those reports.    Complicating Factors: The patient already has does not have any pertinent problems on file. to contribute to the complexity of this ED evaluation.    Medical Decision Making  78-year-old male presents ER with complaint of abnormal labs from [facility].  Patient with elevated BUN/creatinine.  Patient repeat potassium is normal and likely hemolyzed test.  Patient with leukocytosis as well as low bicarb secondary to dehydration.  Patient given  1 L IV fluids.  Patient will be admitted for further evaluation.  [facility] notified of the patient's admission.  Rocephin started for urinary tract infection.  [facility] hospitalist notified of the admission.    Problems Addressed:  Acute cystitis without hematuria: acute illness or injury  KIMBERLEY (acute kidney injury) (HCC): acute illness or injury  Dehydration: acute illness or injury  Leukocytosis, unspecified type: acute illness or injury    Amount and/or Complexity of Data Reviewed  Independent Historian:      Details: Medical history obtained from EMS states patient had abnormal labs with elevated BUN/creatinine as well as potassium.  External Data Reviewed: labs.     Details: Labs reviewed from 7/16.  Patient with elevated BUN/creatinine as well as potassium.  Labs: ordered. Decision-making details documented in ED Course.  Radiology: ordered and independent interpretation performed. Decision-making details documented in ED Course.     Details: Chest x-ray interpreted myself shows no acute cardiopulmonary issues.        Condition upon leaving the department: Stable    Disposition and Plan     Clinical Impression:  1. KIMBERLEY (acute kidney injury) (HCC)    2. Leukocytosis, unspecified type    3. Dehydration    4. Acute cystitis without hematuria        Disposition:  Admit    Follow-up:  No follow-up provider specified.    Medications Prescribed:  Current Discharge Medication List          Hospital Problems       Present on Admission  Date Reviewed: 12/4/2023            ICD-10-CM Noted POA    * (Principal) KIMBERLEY (acute kidney injury) (HCC) N17.9 7/17/2024 Unknown

## 2024-07-17 NOTE — ED INITIAL ASSESSMENT (HPI)
PT to ED via stretcher. EMS reporting abnormal labs from today at 0325 per paperowrk, K 6.4,  and Creatinine 4.25. Hx of stage 4 CKD EMS did not know if patient is on dialysis, CVA L sided hemiparesis, baseline mental status A&Ox1.

## 2024-07-17 NOTE — ED QUICK NOTES
Orders for admission, patient is aware of plan and ready to go upstairs. Any questions, please call ED RN Le;;y at extension [phone]  .     Patient Covid vaccination status: Fully vaccinated     COVID Test Ordered in ED: None    COVID Suspicion at Admission: N/A    Running Infusions:  None    Mental Status/LOC at time of transport: a&ox2    Other pertinent information:   CIWA score: N/A   NIH score:  N/A

## 2024-07-18 ENCOUNTER — APPOINTMENT (OUTPATIENT)
Dept: ULTRASOUND IMAGING | Facility: HOSPITAL | Age: 79
End: 2024-07-18
Attending: HOSPITALIST
Payer: MEDICARE

## 2024-07-18 PROBLEM — D72.829 LEUKOCYTOSIS, UNSPECIFIED TYPE: Status: ACTIVE | Noted: 2024-07-18

## 2024-07-18 PROBLEM — Z51.5 PALLIATIVE CARE BY SPECIALIST: Status: ACTIVE | Noted: 2024-07-18

## 2024-07-18 LAB
ANION GAP SERPL CALC-SCNC: 12 MMOL/L (ref 0–18)
ANION GAP SERPL CALC-SCNC: 13 MMOL/L (ref 0–18)
ATRIAL RATE: 85 BPM
BUN BLD-MCNC: 101 MG/DL (ref 9–23)
BUN BLD-MCNC: 99 MG/DL (ref 9–23)
BUN/CREAT SERPL: 23.7 (ref 10–20)
BUN/CREAT SERPL: 24.5 (ref 10–20)
CALCIUM BLD-MCNC: 8.1 MG/DL (ref 8.7–10.4)
CALCIUM BLD-MCNC: 8.1 MG/DL (ref 8.7–10.4)
CHLORIDE SERPL-SCNC: 120 MMOL/L (ref 98–112)
CHLORIDE SERPL-SCNC: 121 MMOL/L (ref 98–112)
CO2 SERPL-SCNC: 16 MMOL/L (ref 21–32)
CO2 SERPL-SCNC: 16 MMOL/L (ref 21–32)
CREAT BLD-MCNC: 4.12 MG/DL
CREAT BLD-MCNC: 4.18 MG/DL
CREAT UR-SCNC: 43.2 MG/DL
EGFRCR SERPLBLD CKD-EPI 2021: 14 ML/MIN/1.73M2 (ref 60–?)
EGFRCR SERPLBLD CKD-EPI 2021: 14 ML/MIN/1.73M2 (ref 60–?)
EST. AVERAGE GLUCOSE BLD GHB EST-MCNC: 123 MG/DL (ref 68–126)
GLUCOSE BLD-MCNC: 135 MG/DL (ref 70–99)
GLUCOSE BLD-MCNC: 71 MG/DL (ref 70–99)
GLUCOSE BLDC GLUCOMTR-MCNC: 101 MG/DL (ref 70–99)
GLUCOSE BLDC GLUCOMTR-MCNC: 109 MG/DL (ref 70–99)
GLUCOSE BLDC GLUCOMTR-MCNC: 125 MG/DL (ref 70–99)
GLUCOSE BLDC GLUCOMTR-MCNC: 179 MG/DL (ref 70–99)
GLUCOSE BLDC GLUCOMTR-MCNC: 64 MG/DL (ref 70–99)
GLUCOSE BLDC GLUCOMTR-MCNC: 80 MG/DL (ref 70–99)
HBA1C MFR BLD: 5.9 % (ref ?–5.7)
OSMOLALITY SERPL CALC.SUM OF ELEC: 339 MOSM/KG (ref 275–295)
OSMOLALITY SERPL CALC.SUM OF ELEC: 340 MOSM/KG (ref 275–295)
P AXIS: 70 DEGREES
P-R INTERVAL: 156 MS
POTASSIUM SERPL-SCNC: 3.4 MMOL/L (ref 3.5–5.1)
POTASSIUM SERPL-SCNC: 3.9 MMOL/L (ref 3.5–5.1)
Q-T INTERVAL: 376 MS
QRS DURATION: 106 MS
QTC CALCULATION (BEZET): 447 MS
R AXIS: 40 DEGREES
SODIUM SERPL-SCNC: 148 MMOL/L (ref 136–145)
SODIUM SERPL-SCNC: 150 MMOL/L (ref 136–145)
SODIUM SERPL-SCNC: 61 MMOL/L
T AXIS: -70 DEGREES
VENTRICULAR RATE: 85 BPM

## 2024-07-18 PROCEDURE — 76770 US EXAM ABDO BACK WALL COMP: CPT | Performed by: HOSPITALIST

## 2024-07-18 PROCEDURE — 99221 1ST HOSP IP/OBS SF/LOW 40: CPT | Performed by: INTERNAL MEDICINE

## 2024-07-18 RX ORDER — MEMANTINE HYDROCHLORIDE 5 MG/1
5 TABLET ORAL DAILY
COMMUNITY

## 2024-07-18 RX ORDER — DEXTROSE MONOHYDRATE 25 G/50ML
50 INJECTION, SOLUTION INTRAVENOUS
Status: DISCONTINUED | OUTPATIENT
Start: 2024-07-18 | End: 2024-07-23

## 2024-07-18 RX ORDER — METOCLOPRAMIDE HYDROCHLORIDE 5 MG/ML
5 INJECTION INTRAMUSCULAR; INTRAVENOUS EVERY 8 HOURS PRN
Status: DISCONTINUED | OUTPATIENT
Start: 2024-07-18 | End: 2024-07-23

## 2024-07-18 RX ORDER — SODIUM CHLORIDE 9 MG/ML
INJECTION, SOLUTION INTRAVENOUS CONTINUOUS
Status: DISCONTINUED | OUTPATIENT
Start: 2024-07-18 | End: 2024-07-18

## 2024-07-18 RX ORDER — NICOTINE POLACRILEX 4 MG
15 LOZENGE BUCCAL
Status: DISCONTINUED | OUTPATIENT
Start: 2024-07-18 | End: 2024-07-23

## 2024-07-18 RX ORDER — DIPHENHYDRAMINE HYDROCHLORIDE 12.5 MG/5ML
25 SOLUTION ORAL 4 TIMES DAILY PRN
Status: DISCONTINUED | OUTPATIENT
Start: 2024-07-18 | End: 2024-07-23

## 2024-07-18 RX ORDER — ONDANSETRON 2 MG/ML
4 INJECTION INTRAMUSCULAR; INTRAVENOUS EVERY 6 HOURS PRN
Status: DISCONTINUED | OUTPATIENT
Start: 2024-07-18 | End: 2024-07-23

## 2024-07-18 RX ORDER — HYDRALAZINE HYDROCHLORIDE 50 MG/1
50 TABLET, FILM COATED ORAL 2 TIMES DAILY
COMMUNITY

## 2024-07-18 RX ORDER — ACETAMINOPHEN 500 MG
500 TABLET ORAL EVERY 4 HOURS PRN
Status: DISCONTINUED | OUTPATIENT
Start: 2024-07-18 | End: 2024-07-23

## 2024-07-18 RX ORDER — DEXTROSE MONOHYDRATE AND SODIUM CHLORIDE 5; .45 G/100ML; G/100ML
INJECTION, SOLUTION INTRAVENOUS CONTINUOUS
Status: DISCONTINUED | OUTPATIENT
Start: 2024-07-18 | End: 2024-07-19

## 2024-07-18 RX ORDER — FEXOFENADINE HCL 60 MG/1
60 TABLET, FILM COATED ORAL DAILY
COMMUNITY

## 2024-07-18 RX ORDER — ACETAMINOPHEN 325 MG/1
2 TABLET ORAL EVERY 6 HOURS PRN
COMMUNITY

## 2024-07-18 RX ORDER — NICOTINE POLACRILEX 4 MG
30 LOZENGE BUCCAL
Status: DISCONTINUED | OUTPATIENT
Start: 2024-07-18 | End: 2024-07-23

## 2024-07-18 NOTE — PLAN OF CARE
Pt lethargic and oriented x2. Dependent. IVF at 100. IV Rocephin. Pt had hypoglycemic episode in am. MD aware protocol followed. 1-1 feed. RA precautions. US kidneys completed. Pt and family updated on plan of care. Plan for palliative consult and monitor labs. Safety precautions in place. Call light within reach.    Problem: Patient Centered Care  Goal: Patient preferences are identified and integrated in the patient's plan of care  Description: Interventions:  - What would you like us to know as we care for you? From [facility]  - Provide timely, complete, and accurate information to patient/family  - Incorporate patient and family knowledge, values, beliefs, and cultural backgrounds into the planning and delivery of care  - Encourage patient/family to participate in care and decision-making at the level they choose  - Honor patient and family perspectives and choices  Outcome: Progressing     Problem: Diabetes/Glucose Control  Goal: Glucose maintained within prescribed range  Description: INTERVENTIONS:  - Monitor Blood Glucose as ordered  - Assess for signs and symptoms of hyperglycemia and hypoglycemia  - Administer ordered medications to maintain glucose within target range  - Assess barriers to adequate nutritional intake and initiate nutrition consult as needed  - Instruct patient on self management of diabetes  Outcome: Progressing     Problem: Patient/Family Goals  Goal: Patient/Family Long Term Goal  Description: Patient's Long Term Goal: To get discharged    Interventions:  - Medications as ordered  - Trend labs  - See additional Care Plan goals for specific interventions  Outcome: Progressing  Goal: Patient/Family Short Term Goal  Description: Patient's Short Term Goal: Skin protection    Interventions:   - Turn in bed  - Protective barrier to prominences  - See additional Care Plan goals for specific interventions  Outcome: Progressing     Problem: SKIN/TISSUE INTEGRITY - ADULT  Goal: Skin  integrity remains intact  Description: INTERVENTIONS  - Assess and document risk factors for pressure ulcer development  - Assess and document skin integrity  - Monitor for areas of redness and/or skin breakdown  - Initiate interventions, skin care algorithm/standards of care as needed  Outcome: Progressing     Problem: NEUROLOGICAL - ADULT  Goal: Achieves stable or improved neurological status  Description: INTERVENTIONS  - Assess for and report changes in neurological status  - Initiate measures to prevent increased intracranial pressure  - Maintain blood pressure and fluid volume within ordered parameters to optimize cerebral perfusion and minimize risk of hemorrhage  - Monitor temperature, glucose, and sodium. Initiate appropriate interventions as ordered  Outcome: Progressing     Problem: Impaired Swallowing  Goal: Minimize aspiration risk  Description: Interventions:  - Patient should be alert and upright for all feedings (90 degrees preferred)  - Offer food and liquids at a slow rate  - No straws  - Encourage small bites of food and small sips of liquid  - Offer pills one at a time, crush or deliver with applesauce as needed  - Discontinue feeding and notify MD (or speech pathologist) if coughing or persistent throat clearing or wet/gurgly vocal quality is noted  Outcome: Progressing

## 2024-07-18 NOTE — CM/SW NOTE
Pt is from [facility]. SW initiated return referral via aidin. Pt will return to [facility] once medically stable.     Plan: Pending medical clearance, DC to [facility].     ALISHA/MARLENE to remain available for support and/or discharge planning.     Anahi Sutton MSW, LSW   x [phone]

## 2024-07-18 NOTE — PLAN OF CARE
Patient is a new admit from the ED, AxO x 1. IV fluids started. NPO. No c/o pain at this time. OK to use PICC line per MD. No acute changes.    Problem: Patient Centered Care  Goal: Patient preferences are identified and integrated in the patient's plan of care  Description: Interventions:  - What would you like us to know as we care for you? From [facility]  - Provide timely, complete, and accurate information to patient/family  - Incorporate patient and family knowledge, values, beliefs, and cultural backgrounds into the planning and delivery of care  - Encourage patient/family to participate in care and decision-making at the level they choose  - Honor patient and family perspectives and choices  Outcome: Progressing     Problem: Diabetes/Glucose Control  Goal: Glucose maintained within prescribed range  Description: INTERVENTIONS:  - Monitor Blood Glucose as ordered  - Assess for signs and symptoms of hyperglycemia and hypoglycemia  - Administer ordered medications to maintain glucose within target range  - Assess barriers to adequate nutritional intake and initiate nutrition consult as needed  - Instruct patient on self management of diabetes  Outcome: Progressing     Problem: Patient/Family Goals  Goal: Patient/Family Long Term Goal  Description: Patient's Long Term Goal: To get discharged    Interventions:  - Medications as ordered  - Trend labs  - See additional Care Plan goals for specific interventions  Outcome: Progressing  Goal: Patient/Family Short Term Goal  Description: Patient's Short Term Goal: Skin protection    Interventions:   - Turn in bed  - Protective barrier to prominences  - See additional Care Plan goals for specific interventions  Outcome: Progressing     Problem: SKIN/TISSUE INTEGRITY - ADULT  Goal: Skin integrity remains intact  Description: INTERVENTIONS  - Assess and document risk factors for pressure ulcer development  - Assess and document skin integrity  - Monitor for areas  of redness and/or skin breakdown  - Initiate interventions, skin care algorithm/standards of care as needed  Outcome: Progressing     Problem: NEUROLOGICAL - ADULT  Goal: Achieves stable or improved neurological status  Description: INTERVENTIONS  - Assess for and report changes in neurological status  - Initiate measures to prevent increased intracranial pressure  - Maintain blood pressure and fluid volume within ordered parameters to optimize cerebral perfusion and minimize risk of hemorrhage  - Monitor temperature, glucose, and sodium. Initiate appropriate interventions as ordered  Outcome: Progressing     Problem: Impaired Swallowing  Goal: Minimize aspiration risk  Description: Interventions:  - Patient should be alert and upright for all feedings (90 degrees preferred)  - Offer food and liquids at a slow rate  - No straws  - Encourage small bites of food and small sips of liquid  - Offer pills one at a time, crush or deliver with applesauce as needed  - Discontinue feeding and notify MD (or speech pathologist) if coughing or persistent throat clearing or wet/gurgly vocal quality is noted  Outcome: Progressing

## 2024-07-18 NOTE — H&P
[facility] Hospitalist H&P       CC:   Chief Complaint   Patient presents with    Abnormal Result        PCP: Irene Cedillo MD    Date of Admission: 7/17/2024  4:13 PM    ASSESSMENT / PLAN:       Mr. Eric is a 77 yo M with PMH of dementia, DM2, CKD3, HTN, CVA who presented with abnormal labs.     KIMBERLEY on CKD  Hyperkalemia  - K 6.7 on admit, repeat K 4.7 without intervention  - Cr 4.61 on admit, , prior Cr 3.0/BUN 50  - renal dose meds  - hold nephrotoxic mediations  - renal consult, appreciate recs  - renal US ordered    UTI  - started on ceftraixone, continue  - f/u Ucx    Leukocytosis  - 2/2 above  - continue abx    Anemia  - hg 9.5 on admit, prior Hg 13 a year ago, has been trending down per SNF labs  - check iron studies  -  monitor for GIB, no signs/symptoms of bleeding  - hold ASA/Eliquis    DM2  - home regimen: dapagliflozin- hold  - accuchecks QID, hypoglycemic protocol, SSI    HTN  - BP low  - hold home meds    CVA  - ASA- hold    Hx DVT  - hold Eliquis with new anemia    Dysphagia  - has been on modified diet with thickened liquids     Vascular Dementia  - lives at [facility]    ACP  - CODE-DNR/select  - POA- wife- updated via phone  - long term resident at [facility]    FN:  - IVF: NS  - Diet: NPO until more awake    DVT Prophy: SCD  Lines: PIV    Dispo: pending clinical course    Outpatient records or previous hospital records reviewed.     Further recommendations pending patient's clinical course.  [facility] hospitalist to continue to follow patient while in house    Patient and/or patient's family given opportunity to ask questions and note understanding and agreeing with therapeutic plan as outlined    Elsy Jang MD  [facility] Hospitalist  Answering Service number: [phone]    HPI       History of Present Illness:     Mr. Eric is a 77 yo M with PMH of dementia, DM2, CKD3, HTN, CVA who presented with abnormal labs. History obtained from family and chart review due to patient's mental  status. Wife states he has been more fatigued lately. SNF noticed worsening kidney function and elevated K and recommended ER evaluation. Patient has lived at LTC facility for almost a year now. He has been on thickened liquids for a while but he does not like drinking the thickened water so wife usually brings him water when she visits every other day. Wife states he was more alert yesterday compared to today. No fevers. No GI bleeding, bloody or black stools that staff has noticed.    In the ED, vitals stable, labs significant for Cr 4.6, , initial K 6.7 but repeat 4.7 without intervention, Hg 9.5, WBC 16. UA with dark cloudy urine, started on ceftriaxone. Renal consulted.     PMH  Past Medical History:    Back pain    Back problem    BPH (benign prostatic hyperplasia)    bph    Chest pain    chest pain on exertion    Deep vein thrombosis (HCC)    Dementia (HCC)    Diabetes (HCC)    Fatigue    chronic fatigue    GERD (gastroesophageal reflux disease)    gerd    Gout    chronic gout    High cholesterol    History of stomach ulcers    HTN    Incontinence    Neuropathic pain    neuropathic pain of both legs    PVD (peripheral vascular disease) (HCC)    pvd    Stroke (HCC)        PSH  Past Surgical History:   Procedure Laterality Date    Back surgery      Cath bare metal stent (bms)          ALL:  No Known Allergies     Home Medications:  No outpatient medications have been marked as taking for the 24 encounter (Hospital Encounter).         Soc Hx  Social History     Tobacco Use    Smoking status: Former     Current packs/day: 0.00     Average packs/day: 1 pack/day for 41.0 years (41.0 ttl pk-yrs)     Types: Cigarettes     Start date: 1975     Quit date: 2016     Years since quittin.5    Smokeless tobacco: Former     Quit date: 2016   Substance Use Topics    Alcohol use: Yes     Comment: rare         Fam Hx  Family History   Problem Relation Age of Onset    Heart Disorder Father     Cancer  Father     Heart Disorder Mother     Cancer Mother         uterine    Cancer Sister     Diabetes Sister     Cancer Brother     Cancer Brother        Review of Systems  Comprehensive ROS reviewed and negative except for what's stated above.     OBJECTIVE:  /72   Pulse 71   Temp 98.1 °F (36.7 °C) (Oral)   Resp 22   Ht 5' 8\" (1.727 m)   Wt 160 lb (72.6 kg)   SpO2 97%   BMI 24.33 kg/m²     GEN: elderly male in NAD, lethargic  HEENT: EOMI  Pulm: CTAB, no crackles or wheezes  CV: RRR, no murmurs  ABD: Soft, non-tender, non-distended, +BS  SKIN: warm, dry  EXT: no edema    Diagnostic Data:    CBC/Chem    Recent Labs   Lab 07/17/24  1633   RBC 3.10*   HGB 9.5*   HCT 29.8*   MCV 96.1   MCH 30.6   MCHC 31.9   RDW 14.8   NEPRELIM 11.61*   WBC 14.3*   .0         Recent Labs   Lab 07/16/24  1822 07/17/24  1633   * 90   * 100*   CREATSERUM 4.61* 4.39*   EGFRCR 12* 13*   CA 9.2 8.4*   * 146*   K 6.7* 4.3   * 117*   CO2 16.0* 16.0*     Lab Results   Component Value Date    WBC 14.3 07/17/2024    HGB 9.5 07/17/2024    HCT 29.8 07/17/2024    .0 07/17/2024    CREATSERUM 4.39 07/17/2024     07/17/2024     07/17/2024    K 4.3 07/17/2024     07/17/2024    CO2 16.0 07/17/2024    GLU 90 07/17/2024    CA 8.4 07/17/2024    ALB 3.7 07/17/2024    ALKPHO 114 07/17/2024    BILT 0.3 07/17/2024    TP 6.5 07/17/2024    AST 19 07/17/2024    ALT 24 07/17/2024    MG 1.8 07/17/2024       No results for input(s): \"TROP\" in the last 168 hours.    Additional Diagnostics:     Radiology: XR CHEST AP PORTABLE  (CPT=71045)    Result Date: 7/17/2024  CONCLUSION: No acute cardiopulmonary abnormality.     Dictated by (CST): Jacinto Villarreal MD on 7/17/2024 at 6:07 PM     Finalized by (CST): Jacinto Villarreal MD on 7/17/2024 at 6:08 PM

## 2024-07-18 NOTE — SLP NOTE
ADULT SWALLOWING EVALUATION    ASSESSMENT    ASSESSMENT/OVERALL IMPRESSION:  PPE REQUIRED. THIS THERAPIST WORE GLOVES, DROPLET MASK, AND GOGGLES FOR DURATION OF EVALUATION. HANDS WASHED UPON ENTRANCE/EXIT.    SLP BSSE orders received and acknowledged. A swallow evaluation warranted as pt on modified diet at NH. Pt afebrile with clear vocal quality, on room air, with oxygen saturation at 95. Pt with prior hx of dysphagia at [facility] in which last recommended diet was minced and moist/mildly thick liquids per BSE in 12/2022. Pt positioned upright in bed. Pt with no complaints of pain, RN aware. Pt with adequate oral acceptance and bilabial seal across all trials. Pt unable to bite soft solid with increased MALCOM on all consistencies. Pt's swallow response appears delayed with reduced hyolaryngeal elevation/excursion. No clinical signs of aspiration (e.g., immediate/delayed throat clear, immediate/delayed cough, wet vocal quality, increased O2 effort) observed across all trials of puree and mildly thick liquids. Overt signs/symptoms of aspiration observed with thin liquids as evidenced by immediate throat clearing. Trials discontinued. Oral/buccal cavities clear of residue following all trials. Oxygen status remained >94 t/o the entire evaluation.     At this time, pt presents with mild-moderate oral dysphagia and probable pharyngeal dysfunction. Recommend a puree diet and mildly thick liquids with strict adherence to safe swallowing compensatory strategies. Results and recommendations reviewed with RN and pt. Pt's RN v/u to all results/recommendations. Recommendations remain written on whiteboard. SLP collaborated with RN for MD diet orders.     PLAN: SLP to f/u x1-2 meal assessments, monitor CXR, and VFSS if clinically warranted.     RECOMMENDATIONS   Diet Recommendations - Solids: Puree  Diet Recommendations - Liquids: Nectar thick liquids/ Mildly thick    Compensatory Strategies Recommended: No straws;Slow rate;Alternate  consistencies;Small bites and sips  Aspiration Precautions: Upright position;Slow rate;Small bites and sips;No straw  Medication Administration Recommendations: Crushed in puree  Treatment Plan/Recommendations: Aspiration precautions    HISTORY   MEDICAL HISTORY  Reason for Referral: Altered diet consistency    Problem List  Principal Problem:    KIMBERLEY (acute kidney injury) (Formerly [facility])  Active Problems:    Dehydration    Acute cystitis without hematuria    Leukocytosis, unspecified type      Past Medical History  Past Medical History:    Back pain    Back problem    BPH (benign prostatic hyperplasia)    bph    Chest pain    chest pain on exertion    Deep vein thrombosis (HCC)    Dementia (Formerly [facility])    Diabetes (Formerly [facility])    Fatigue    chronic fatigue    GERD (gastroesophageal reflux disease)    gerd    Gout    chronic gout    High cholesterol    History of stomach ulcers    HTN    Incontinence    Neuropathic pain    neuropathic pain of both legs    PVD (peripheral vascular disease) (Formerly [facility])    pvd    Stroke (Formerly [facility])       Prior Living Situation: Skilled nursing facility  Diet Prior to Admission: Unknown  Precautions: Aspiration    Patient/Family Goals: None stated    SWALLOWING HISTORY  Current Diet Consistency: NPO  Dysphagia History:   BSE 1/5/22: minced and moist/mildly thick  BSE 12/3/22: minced and moist/mildly thick    Imaging Results:     CXR 7/17/24:  CONCLUSION: No acute cardiopulmonary abnormality.             Dictated by (CST): Jacinto Villarreal MD on 7/17/2024 at 6:07 PM       Finalized by (CST): Jacinto Villarreal MD on 7/17/2024 at 6:08 PM     OBJECTIVE   ORAL MOTOR EXAMINATION  Dentition: Edentulous  Symmetry: Within Functional Limits  Strength:  (reduced)     Range of Motion: Within Functional Limits  Rate of Motion: Reduced    Voice Quality: Clear  Respiratory Status: Unlabored  Consistencies Trialed: Thin liquids;Nectar thick liquids;Puree;Soft solid  Method of Presentation: Staff/Clinician assistance;Spoon;Cup;Single sips  Patient  Positioning: Upright;Midline    Oral Phase of Swallow: Impaired  Bolus Retrieval: Intact  Bilabial Seal: Intact  Bolus Formation: Impaired  Bolus Propulsion: Impaired  Mastication: Impaired       Pharyngeal Phase of Swallow: Impaired  Laryngeal Elevation Timing: Appears impaired  Laryngeal Elevation Strength: Appears impaired     (Please note: Silent aspiration cannot be evaluated clinically. Videofluoroscopic Swallow Study is required to rule-out silent aspiration.)    Esophageal Phase of Swallow: No complaints consistent with possible esophageal involvement    GOALS  Goal #1 The patient will tolerate puree consistency and mildly thick liquids without overt signs or symptoms of aspiration with 100 % accuracy over 1-2 session(s).  In Progress   Goal #2 The patient/family/caregiver will demonstrate understanding and implementation of aspiration precautions and swallow strategies independently over 1-2 session(s).    In Progress     FOLLOW UP  Treatment Plan/Recommendations: Aspiration precautions  Number of Visits to Meet Established Goals:  (1-2)  Follow Up Needed (Documentation Required): Yes  SLP Follow-up Date: 07/19/24    Thank you for your referral.   If you have any questions, please contact SPIKE Rush M.S. CCC-SLP  Speech Language Pathologist  Phone Number [phone]

## 2024-07-18 NOTE — CONSULTS
Palliative Care Initial Inpatient Consult    Cole Eric Patient Status:  Inpatient    1945 MRN [id number]   Location [facility] 5SW/SE Attending Elsy Jang MD   Hosp Day # 0 PCP Irene Cedillo MD     Date of Consult: 2024  Patient seen at: [facility]    Reason for Consultation: Consult requested for goals of care and care coordination.    Subjective     History of Present Illness: Mr. Eric is a 77 yo M with PMH of dementia, DM2, CKD3, HTN, CVA who presented with abnormal labs. History obtained from family and chart review due to patient's mental status. Wife states he has been more fatigued lately. SNF noticed worsening kidney function and elevated K and recommended ER evaluation. Patient has lived at LTC facility for almost a year now. He has been on thickened liquids for a while but he does not like drinking the thickened water so wife usually brings him water when she visits every other day. Wife states he was more alert yesterday compared to today. No fevers. No GI bleeding, bloody or black stools that staff has noticed.     In the ED, vitals stable, labs significant for Cr 4.6, , initial K 6.7 but repeat 4.7 without intervention, Hg 9.5, WBC 16. UA with dark cloudy urine, started on ceftriaxone. Renal consulted.  History was obtained from [facility] and discussion with family.  Our palliative care service was asked to evaluate the patient for a goals of care discussion and symptom management.      Review of Systems: Palliative Care symptom needs assessed:     Unable to obtain     Palliative Care Social History:   Marital Status:   Children: Yes  Living Situation Prior to Admit: LTC  Occupational History: Retired  Personal:     Spiritual  Cole Eric  ANASTACIA Blood requested: No    Medical History: obtained from [facility]  Past Medical History:    Back pain    Back problem    BPH (benign prostatic hyperplasia)    bph    Chest pain    chest pain on  exertion    Deep vein thrombosis (HCC)    Dementia (HCC)    Diabetes (HCC)    Fatigue    chronic fatigue    GERD (gastroesophageal reflux disease)    gerd    Gout    chronic gout    High cholesterol    History of stomach ulcers    HTN    Incontinence    Neuropathic pain    neuropathic pain of both legs    PVD (peripheral vascular disease) (HCC)    pvd    Stroke (HCC)     Past Surgical History:   Procedure Laterality Date    Back surgery      Cath bare metal stent (bms)         Family History: obtained from [facility]  Family History   Problem Relation Age of Onset    Heart Disorder Father     Cancer Father     Heart Disorder Mother     Cancer Mother         uterine    Cancer Sister     Diabetes Sister     Cancer Brother     Cancer Brother        Allergies:  No Known Allergies    Medications:     Current Facility-Administered Medications:     glucose (Dex4) 15 GM/59ML oral liquid 15 g, 15 g, Oral, Q15 Min PRN **OR** glucose (Glutose) 40% oral gel 15 g, 15 g, Oral, Q15 Min PRN **OR** glucose-vitamin C (Dex-4) chewable tab 4 tablet, 4 tablet, Oral, Q15 Min PRN **OR** dextrose 50% injection 50 mL, 50 mL, Intravenous, Q15 Min PRN **OR** glucose (Dex4) 15 GM/59ML oral liquid 30 g, 30 g, Oral, Q15 Min PRN **OR** glucose (Glutose) 40% oral gel 30 g, 30 g, Oral, Q15 Min PRN **OR** glucose-vitamin C (Dex-4) chewable tab 8 tablet, 8 tablet, Oral, Q15 Min PRN    acetaminophen (Tylenol Extra Strength) tab 500 mg, 500 mg, Oral, Q4H PRN    ondansetron (Zofran) 4 MG/2ML injection 4 mg, 4 mg, Intravenous, Q6H PRN    metoclopramide (Reglan) 5 mg/mL injection 5 mg, 5 mg, Intravenous, Q8H PRN    insulin aspart (NovoLOG) 100 Units/mL FlexPen 1-5 Units, 1-5 Units, Subcutaneous, TID CC    dextrose 5%-sodium chloride 0.45% infusion, , Intravenous, Continuous    iron sucrose (Venofer) 400 mg in sodium chloride 0.9% 250 mL IVPB, 400 mg, Intravenous, Daily    [START ON 7/20/2024] iron sucrose (Venofer) 20 mg/mL injection 200 mg, 200 mg,  Intravenous, Once  No current outpatient medications on file.         Palliative Performance Scale: 30 %  % Ambulation Activity Level Self-Care Intake Consciousness   100 Full  Normal  No Disease Full Normal Full   90 Full  Normal  Some Disease Full Normal Full   80 Full  Normal w/effort  Some Disease Full Normal or reduced Full   70 Reduced  Can't Perform Job  Some Disease Full Normal or reduced Full   60 Reduced  Can't Perform Hobby   Significant Disease Occ Assist Normal or reduced Full or confused   50 Mainly sit/lie Can't do any work  Extensive Disease Partial Assist Normal or reduced Full or confused   40 Mainly in bed Can't do any work  Extensive Disease Mainly Assist Normal or reduced Full or confused   30 Bed Bound Can't do any work  Extensive Disease Max Assist  Total Care Reduced  Drowsy/confused   20 Bed Bound Can't do any work  Extensive Disease Max Assist  Total Care Minimal  Drowsy/confused   10 Bed Bound Can't do any work  Extensive Disease Max Assist  Total Care Mouth Care  Drowsy/confused   0 Death        Objective      Vital Signs:  Blood pressure 105/67, pulse 84, temperature 97.3 °F (36.3 °C), temperature source Oral, resp. rate 18, height 5' 8\" (1.727 m), weight 142 lb 3.2 oz (64.5 kg), SpO2 96%.  Body mass index is 21.62 kg/m².  Non-verbal signs of pain present: No    Physical Exam:  General: Alert, awake and in no  apparent respiratory distress.  HEENT: Facial droop  Cardiac: RRR  Lungs: Normal effort on RA  Abdomen: Soft, non-tender, non-distended, normal bowel sounds X 4 quadrants   Extremities: FROM of all limbs, mild-mod Edema present  Skin: Warm and dry.    Hematology:  Lab Results   Component Value Date    WBC 14.3 (H) 07/17/2024    HGB 9.5 (L) 07/17/2024    HCT 29.8 (L) 07/17/2024    .0 07/17/2024       Coags:  Lab Results   Component Value Date    INR 1.0 04/07/2017    PTT 34.0 04/07/2017       Chemistry:  Lab Results   Component Value Date    CREATSERUM 4.18 (H) 07/18/2024     BUN 99 (H) 07/18/2024     (H) 07/18/2024    K 3.9 07/18/2024     (H) 07/18/2024    CO2 16.0 (L) 07/18/2024    GLU 71 07/18/2024    CA 8.1 (L) 07/18/2024    ALB 3.7 07/17/2024    ALKPHO 114 07/17/2024    BILT 0.3 07/17/2024    TP 6.5 07/17/2024    AST 19 07/17/2024    ALT 24 07/17/2024    MG 1.8 07/17/2024    PHOS 3.6 04/12/2017    TROP 0.07 (HH) 04/07/2017       Imaging:  XR CHEST AP PORTABLE  (CPT=71045)    Result Date: 7/17/2024  CONCLUSION: No acute cardiopulmonary abnormality.     Dictated by (CST): Jacinto Villarreal MD on 7/17/2024 at 6:07 PM     Finalized by (CST): Jacinto Villarreal MD on 7/17/2024 at 6:08 PM           Assessment and Recommendations        KIMBERLEY (acute kidney injury) (HCC)    Dehydration    Acute cystitis without hematuria    Leukocytosis, unspecified type        Symptom Management        Pruritis:  -has been having back/arm pruritus since living at LTC  -will add benadryl prn  -will discuss workup w Dr. Jang      2.     Serious Illness Conversation:   Code Status: DNR/Select  POA: Yes, wife Fernandez (pronounced like Anne-Marie)  I met with POA and son Cole at bedside. Patient was at xray for majority of my discussion. They feel that they have been supported and well-informed regarding his renal function and offered medical therapies for his acute illnesses.  Fernandez and he have discussed advanced directives and agreed that he would most likely NOT want to be kept alive on hemodialysis however they are also taking things one day at a time. I think this is very appropriate and am confident they have a good family internal-support system to help with coping and decision-making.  I will be following along to discuss this and other decisions as they arise including [facility] care and nutrition.    Palliative Care Follow Up: Palliative care team will Continue to follow while inpatient    Thank you for allowing Palliative Care services to participate in the care of Cole Eric.    A total of 50  minutes were spent on this visit, which included all of the following: direct face to face contact, history taking, physical examination, and >50% was spent counseling and coordinating care.    Christopher Sosa MD  7/18/2024  3:07 PM  Palliative Care Services  [facility] [phone]    Note to patient:  The 21st Century Cures Act makes medical notes like these available to patients in the interest of transparency. However, be advised this is a medical document. It is intended as peer to peer communication. It is written in medical language and may contain abbreviations or verbiage that are unfamiliar. It may appear blunt or direct. Medical documents are intended to carry relevant information, facts as evident, and the clinical opinion of the practitioner.

## 2024-07-18 NOTE — CONSULTS
[facility]  part of [facility]    Report of Consultation    Cole Eric Patient Status:  Inpatient    1945 MRN [id number]   Location [facility] 5SW/SE Attending Elsy Jang MD   Hosp Day # 0 PCP Irene Cedillo MD     Reason for Consultation:  kimberley    History of Present Illness:  Cole Eric is a a(n) 78 year old male.   79 yo M with PMH of dementia, DM2, CKD3-4, HTN, CVA who presented with abnormal labs.   K 6.7 opt and noted KIMBERLEY on CKD 4  Denies having a nephrologist  C/f UTI started on abx  On IVF  Denies sob  Poor historian, hx obtained by wife/son at bedside  Nephrology consulted d/t KIMBERLEY    History:  Past Medical History:    Back pain    Back problem    BPH (benign prostatic hyperplasia)    bph    Chest pain    chest pain on exertion    Deep vein thrombosis (HCC)    Dementia (HCC)    Diabetes (HCC)    Fatigue    chronic fatigue    GERD (gastroesophageal reflux disease)    gerd    Gout    chronic gout    High cholesterol    History of stomach ulcers    HTN    Incontinence    Neuropathic pain    neuropathic pain of both legs    PVD (peripheral vascular disease) (HCC)    pvd    Stroke (HCC)     Past Surgical History:   Procedure Laterality Date    Back surgery      Cath bare metal stent (bms)       Family History   Problem Relation Age of Onset    Heart Disorder Father     Cancer Father     Heart Disorder Mother     Cancer Mother         uterine    Cancer Sister     Diabetes Sister     Cancer Brother     Cancer Brother       reports that he quit smoking about 8 years ago. His smoking use included cigarettes. He started smoking about 49 years ago. He has a 41 pack-year smoking history. He quit smokeless tobacco use about 8 years ago. He reports current alcohol use. He reports that he does not use drugs.    Allergies:  No Known Allergies    Medications:    Current Facility-Administered Medications:     glucose (Dex4) 15 GM/59ML oral liquid 15 g, 15 g, Oral, Q15 Min PRN  **OR** glucose (Glutose) 40% oral gel 15 g, 15 g, Oral, Q15 Min PRN **OR** glucose-vitamin C (Dex-4) chewable tab 4 tablet, 4 tablet, Oral, Q15 Min PRN **OR** dextrose 50% injection 50 mL, 50 mL, Intravenous, Q15 Min PRN **OR** glucose (Dex4) 15 GM/59ML oral liquid 30 g, 30 g, Oral, Q15 Min PRN **OR** glucose (Glutose) 40% oral gel 30 g, 30 g, Oral, Q15 Min PRN **OR** glucose-vitamin C (Dex-4) chewable tab 8 tablet, 8 tablet, Oral, Q15 Min PRN    acetaminophen (Tylenol Extra Strength) tab 500 mg, 500 mg, Oral, Q4H PRN    ondansetron (Zofran) 4 MG/2ML injection 4 mg, 4 mg, Intravenous, Q6H PRN    metoclopramide (Reglan) 5 mg/mL injection 5 mg, 5 mg, Intravenous, Q8H PRN    insulin aspart (NovoLOG) 100 Units/mL FlexPen 1-5 Units, 1-5 Units, Subcutaneous, TID CC    dextrose 5%-sodium chloride 0.45% infusion, , Intravenous, Continuous  No current outpatient medications on file.         Physical Exam:  /67 (BP Location: Left arm)   Pulse 84   Temp 97.3 °F (36.3 °C) (Oral)   Resp 18   Ht 5' 8\" (1.727 m)   Wt 142 lb 3.2 oz (64.5 kg)   SpO2 96%   BMI 21.62 kg/m²   Temp (24hrs), Av.6 °F (36.4 °C), Min:97.3 °F (36.3 °C), Max:98.1 °F (36.7 °C)       Intake/Output Summary (Last 24 hours) at 2024 1316  Last data filed at 2024 1042  Gross per 24 hour   Intake 881.67 ml   Output 250 ml   Net 631.67 ml     Last 3 Weights   24 0019 142 lb 3.2 oz (64.5 kg)   24 0009 142 lb 1.6 oz (64.5 kg)   24 1545 160 lb (72.6 kg)   23 1312 169 lb 12.1 oz (77 kg)   23 1750 170 lb (77.1 kg)   23 1614 158 lb (71.7 kg)   23 1205 158 lb 11.7 oz (72 kg)   22 1832 159 lb 11.2 oz (72.4 kg)   22 1251 150 lb (68 kg)       General: Alert and oriented in no apparent distress.  HEENT: No scleral icterus, MMM  Neck: Supple  Cardiac:  S1, S2 normal  Lungs: Clear without wheezes, rales, rhonchi.    Abdomen: Soft, non-tender. + bowel sounds, no palpable organomegaly  Extremities:  Without clubbing, cyanosis or edema.  Neurologic: Alert and oriented  Skin: Warm and dry      Laboratory Data:  Lab Results   Component Value Date    WBC 14.3 07/17/2024    HGB 9.5 07/17/2024    HCT 29.8 07/17/2024    .0 07/17/2024    CREATSERUM 4.18 07/18/2024    BUN 99 07/18/2024     07/18/2024    K 3.9 07/18/2024     07/18/2024    CO2 16.0 07/18/2024    GLU 71 07/18/2024    CA 8.1 07/18/2024    ALB 3.7 07/17/2024    ALKPHO 114 07/17/2024    BILT 0.3 07/17/2024    TP 6.5 07/17/2024    AST 19 07/17/2024    ALT 24 07/17/2024    MG 1.8 07/17/2024    B12 529 07/17/2024    PGLU 101 07/18/2024       BUN (mg/dL)   Date Value   07/18/2024 99 (H)   07/17/2024 100 (H)   07/16/2024 116 (H)     Creatinine (mg/dL)   Date Value   07/18/2024 4.18 (H)   07/17/2024 4.39 (H)   07/16/2024 4.61 (H)       No results found for: \"MICROALBCREA\"    Recent Labs   Lab 07/17/24  1633   WBC 14.3*   HGB 9.5*   MCV 96.1   .0       Recent Labs   Lab 07/16/24  1822 07/17/24  1633 07/18/24  0517   * 146* 150*   K 6.7* 4.3 3.9   * 117* 121*   CO2 16.0* 16.0* 16.0*   * 100* 99*   CREATSERUM 4.61* 4.39* 4.18*   CA 9.2 8.4* 8.1*   MG  --  1.8  --    * 90 71       Recent Labs   Lab 07/17/24  1633   ALT 24   AST 19   ALB 3.7       Recent Labs   Lab 07/18/24  0013 07/18/24  0736 07/18/24  0757 07/18/24  0956 07/18/24  1154   PGLU 80 64* 179* 125* 101*            Impression/Plan:    KIMBERLEY  CKD 4  hypernatremia  Hyperkalemia  Poor po intake, volume depletion  Anemia    Cr 4.61 on admission. Improving to 4.1 today. BL Cr ~ 2.7-3.1.   Low K diet. If K > 5.5, consider lokelma  iron studies done - %sat 8% - start IV iron 400mg today/filippo, 200mg sat  check vit D   No acute HD indication. Additionally pt not good candidate given poor baseline cognitive and functional status (bedbound), dysphagia, vascular dementia etc. Family in agreement and have discussed in past that pt would not be appropriate HD candidate.  Will continue supportive mgmt  Strict I&os  Encouage po feeding as able per diet due to dysphagia  Hold PTA ARB indefinitely  Switched IVF to 1/2 NS   Repeat Na in pm. Page if Na worse  RFP I AM       Thank you for allowing me to participate in the care of your patient. Please do not hesitate to call with any questions or concerns.       Soto Perry MD  Nephrology  [facility]

## 2024-07-19 ENCOUNTER — APPOINTMENT (OUTPATIENT)
Dept: GENERAL RADIOLOGY | Facility: HOSPITAL | Age: 79
End: 2024-07-19
Attending: HOSPITALIST
Payer: MEDICARE

## 2024-07-19 LAB
ANION GAP SERPL CALC-SCNC: 11 MMOL/L (ref 0–18)
BUN BLD-MCNC: 94 MG/DL (ref 9–23)
BUN/CREAT SERPL: 24.4 (ref 10–20)
CALCIUM BLD-MCNC: 8.3 MG/DL (ref 8.7–10.4)
CHLORIDE SERPL-SCNC: 121 MMOL/L (ref 98–112)
CO2 SERPL-SCNC: 16 MMOL/L (ref 21–32)
CREAT BLD-MCNC: 3.85 MG/DL
DEPRECATED RDW RBC AUTO: 50.4 FL (ref 35.1–46.3)
EGFRCR SERPLBLD CKD-EPI 2021: 15 ML/MIN/1.73M2 (ref 60–?)
ERYTHROCYTE [DISTWIDTH] IN BLOOD BY AUTOMATED COUNT: 14.6 % (ref 11–15)
GLUCOSE BLD-MCNC: 94 MG/DL (ref 70–99)
GLUCOSE BLDC GLUCOMTR-MCNC: 102 MG/DL (ref 70–99)
GLUCOSE BLDC GLUCOMTR-MCNC: 109 MG/DL (ref 70–99)
GLUCOSE BLDC GLUCOMTR-MCNC: 111 MG/DL (ref 70–99)
GLUCOSE BLDC GLUCOMTR-MCNC: 136 MG/DL (ref 70–99)
GLUCOSE BLDC GLUCOMTR-MCNC: 88 MG/DL (ref 70–99)
HCT VFR BLD AUTO: 27.2 %
HGB BLD-MCNC: 9.1 G/DL
INR BLD: 1.23 (ref 0.8–1.2)
MCH RBC QN AUTO: 31.4 PG (ref 26–34)
MCHC RBC AUTO-ENTMCNC: 33.5 G/DL (ref 31–37)
MCV RBC AUTO: 93.8 FL
OSMOLALITY SERPL CALC.SUM OF ELEC: 335 MOSM/KG (ref 275–295)
PLATELET # BLD AUTO: 157 10(3)UL (ref 150–450)
POTASSIUM SERPL-SCNC: 3.2 MMOL/L (ref 3.5–5.1)
PROTHROMBIN TIME: 16.2 SECONDS (ref 11.6–14.8)
RBC # BLD AUTO: 2.9 X10(6)UL
SODIUM SERPL-SCNC: 148 MMOL/L (ref 136–145)
VIT D+METAB SERPL-MCNC: 25.4 NG/ML (ref 30–100)
WBC # BLD AUTO: 7.4 X10(3) UL (ref 4–11)

## 2024-07-19 PROCEDURE — 99232 SBSQ HOSP IP/OBS MODERATE 35: CPT | Performed by: INTERNAL MEDICINE

## 2024-07-19 PROCEDURE — 5A0945A ASSISTANCE WITH RESPIRATORY VENTILATION, 24-96 CONSECUTIVE HOURS, HIGH NASAL FLOW/VELOCITY: ICD-10-PCS | Performed by: HOSPITALIST

## 2024-07-19 PROCEDURE — 71045 X-RAY EXAM CHEST 1 VIEW: CPT | Performed by: HOSPITALIST

## 2024-07-19 RX ORDER — FUROSEMIDE 10 MG/ML
40 INJECTION INTRAMUSCULAR; INTRAVENOUS ONCE
Status: COMPLETED | OUTPATIENT
Start: 2024-07-19 | End: 2024-07-19

## 2024-07-19 RX ORDER — HYDROXYZINE HYDROCHLORIDE 25 MG/1
25 TABLET, FILM COATED ORAL 3 TIMES DAILY PRN
Status: DISCONTINUED | OUTPATIENT
Start: 2024-07-19 | End: 2024-07-23

## 2024-07-19 RX ORDER — BENZOCAINE/MENTHOL 6 MG-10 MG
LOZENGE MUCOUS MEMBRANE 2 TIMES DAILY
Status: DISCONTINUED | OUTPATIENT
Start: 2024-07-19 | End: 2024-07-23

## 2024-07-19 NOTE — PROGRESS NOTES
[facility]  part of [facility]    Report of Consultation    Cole Eric Patient Status:  Inpatient    1945 MRN [id number]   Location [facility] 5SW/SE Attending Elsy Jang MD   Hosp Day # 1 PCP Irene Cedillo MD     Reason for Consultation:  kimberley    History of Present Illness:  Cole Eric is a a(n) 78 year old male.   79 yo M with PMH of dementia, DM2, CKD3-4, HTN, CVA who presented with abnormal labs.   K 6.7 opt and noted KIMBERLEY on CKD 4  Denies having a nephrologist  C/f UTI started on abx  On IVF  Denies sob  Poor historian, hx obtained by wife/son at bedside  Nephrology consulted d/t KIMBERLEY    IH:   More sob in AM ,on 6L  Cxr mod pulm edema  IVF stopped and to receive IV lasix    History:  Past Medical History:    Back pain    Back problem    BPH (benign prostatic hyperplasia)    bph    Chest pain    chest pain on exertion    Deep vein thrombosis (HCC)    Dementia (HCC)    Diabetes (HCC)    Fatigue    chronic fatigue    GERD (gastroesophageal reflux disease)    gerd    Gout    chronic gout    High cholesterol    History of stomach ulcers    HTN    Incontinence    Neuropathic pain    neuropathic pain of both legs    PVD (peripheral vascular disease) (HCC)    pvd    Stroke (HCC)     Past Surgical History:   Procedure Laterality Date    Back surgery      Cath bare metal stent (bms)       Family History   Problem Relation Age of Onset    Heart Disorder Father     Cancer Father     Heart Disorder Mother     Cancer Mother         uterine    Cancer Sister     Diabetes Sister     Cancer Brother     Cancer Brother       reports that he quit smoking about 8 years ago. His smoking use included cigarettes. He started smoking about 49 years ago. He has a 41 pack-year smoking history. He quit smokeless tobacco use about 8 years ago. He reports current alcohol use. He reports that he does not use drugs.    Allergies:  No Known Allergies    Medications:    Current  Facility-Administered Medications:     hydrocortisone 1 % cream, , Topical, BID    furosemide (Lasix) 10 mg/mL injection 40 mg, 40 mg, Intravenous, Once    glucose (Dex4) 15 GM/59ML oral liquid 15 g, 15 g, Oral, Q15 Min PRN **OR** glucose (Glutose) 40% oral gel 15 g, 15 g, Oral, Q15 Min PRN **OR** glucose-vitamin C (Dex-4) chewable tab 4 tablet, 4 tablet, Oral, Q15 Min PRN **OR** dextrose 50% injection 50 mL, 50 mL, Intravenous, Q15 Min PRN **OR** glucose (Dex4) 15 GM/59ML oral liquid 30 g, 30 g, Oral, Q15 Min PRN **OR** glucose (Glutose) 40% oral gel 30 g, 30 g, Oral, Q15 Min PRN **OR** glucose-vitamin C (Dex-4) chewable tab 8 tablet, 8 tablet, Oral, Q15 Min PRN    acetaminophen (Tylenol Extra Strength) tab 500 mg, 500 mg, Oral, Q4H PRN    ondansetron (Zofran) 4 MG/2ML injection 4 mg, 4 mg, Intravenous, Q6H PRN    metoclopramide (Reglan) 5 mg/mL injection 5 mg, 5 mg, Intravenous, Q8H PRN    insulin aspart (NovoLOG) 100 Units/mL FlexPen 1-5 Units, 1-5 Units, Subcutaneous, TID CC    diphenhydrAMINE (Benadryl) 12.5 MG/5ML oral liquid 25 mg, 25 mg, Oral, QID PRN    [START ON 2024] sodium ferric gluconate (Ferrlecit) 125 mg in sodium chloride 0.9% 100mL IVPB premix, 125 mg, Intravenous, Once    cefTRIAXone (Rocephin) 1 g in sodium chloride 0.9% 100 mL IVPB-ADDV, 1 g, Intravenous, Q24H  No current outpatient medications on file.         Physical Exam:  BP (!) 132/97 (BP Location: Left arm)   Pulse 102   Temp 97.3 °F (36.3 °C) (Axillary)   Resp 20   Ht 5' 8\" (1.727 m)   Wt 142 lb 3.2 oz (64.5 kg)   SpO2 92%   BMI 21.62 kg/m²   Temp (24hrs), Av.6 °F (36.4 °C), Min:97.3 °F (36.3 °C), Max:97.8 °F (36.6 °C)       Intake/Output Summary (Last 24 hours) at 2024 1204  Last data filed at 2024 0912  Gross per 24 hour   Intake 1400 ml   Output 1000 ml   Net 400 ml     Last 3 Weights   24 0019 142 lb 3.2 oz (64.5 kg)   24 0009 142 lb 1.6 oz (64.5 kg)   24 1545 160 lb (72.6 kg)   23  1312 169 lb 12.1 oz (77 kg)   09/24/23 1750 170 lb (77.1 kg)   03/20/23 1614 158 lb (71.7 kg)   03/20/23 1205 158 lb 11.7 oz (72 kg)   12/02/22 1832 159 lb 11.2 oz (72.4 kg)   12/02/22 1251 150 lb (68 kg)       General: Alert and oriented in no apparent distress.  HEENT: No scleral icterus, MMM  Neck: Supple  Cardiac:  S1, S2 normal  Lungs: Clear without wheezes, rales, rhonchi.    Abdomen: Soft, non-tender. + bowel sounds, no palpable organomegaly  Extremities: Without clubbing, cyanosis or edema.  Neurologic: Alert and oriented  Skin: Warm and dry      Laboratory Data:  Lab Results   Component Value Date    WBC 7.4 07/19/2024    HGB 9.1 07/19/2024    HCT 27.2 07/19/2024    .0 07/19/2024    CREATSERUM 3.85 07/19/2024    BUN 94 07/19/2024     07/19/2024    K 3.2 07/19/2024     07/19/2024    CO2 16.0 07/19/2024    GLU 94 07/19/2024    CA 8.3 07/19/2024    INR 1.23 07/19/2024    PTP 16.2 07/19/2024    PGLU 88 07/19/2024       BUN (mg/dL)   Date Value   07/19/2024 94 (H)   07/18/2024 101 (H)   07/18/2024 99 (H)     Creatinine (mg/dL)   Date Value   07/19/2024 3.85 (H)   07/18/2024 4.12 (H)   07/18/2024 4.18 (H)       No results found for: \"MICROALBCREA\"    Recent Labs   Lab 07/17/24  1633 07/19/24  0518   WBC 14.3* 7.4   HGB 9.5* 9.1*   MCV 96.1 93.8   .0 157.0   INR  --  1.23*       Recent Labs   Lab 07/16/24  1822 07/17/24  1633 07/18/24  0517 07/18/24  1633 07/19/24  0518   * 146* 150* 148* 148*   K 6.7* 4.3 3.9 3.4* 3.2*   * 117* 121* 120* 121*   CO2 16.0* 16.0* 16.0* 16.0* 16.0*   * 100* 99* 101* 94*   CREATSERUM 4.61* 4.39* 4.18* 4.12* 3.85*   CA 9.2 8.4* 8.1* 8.1* 8.3*   MG  --  1.8  --   --   --    * 90 71 135* 94       Recent Labs   Lab 07/17/24  1633   ALT 24   AST 19   ALB 3.7       Recent Labs   Lab 07/18/24  0956 07/18/24  1154 07/18/24  1702 07/18/24  2102 07/19/24  0734   PGLU 125* 101* 136* 109* 88            Impression/Plan:    KIMBERLEY  CKD  4  hypernatremia  Hyperkalemia  Poor po intake, volume depletion  Anemia  Hypoxic resp failure from IVF    Cr 4.61 on admission. Improving to 3.8 with IVF. BL Cr ~ 2.7-3.1.   Low K diet.    iron studies done - %sat 8% - start IV iron    On 1/2 NS - increased dyspnea, hypoxia and cxr mod pulm edema. Stop IVF and give IV lasix 40mg x1. If pt remains hypoxic in evening can redose PRN  Encourage po hydration  check vit D   No acute HD indication. Additionally pt not good candidate given poor baseline cognitive and functional status (bedbound), dysphagia, vascular dementia etc. Family in agreement and have discussed in past that pt would not be appropriate HD candidate. Will continue supportive mgmt  Strict I&os  Encouage po feeding as able per diet due to dysphagia  Hold PTA ARB indefinitely  RFP in AM       Thank you for allowing me to participate in the care of your patient. Please do not hesitate to call with any questions or concerns.       Soto Perry MD  Nephrology  [facility]

## 2024-07-19 NOTE — OCCUPATIONAL THERAPY NOTE
OCCUPATIONAL THERAPY EVALUATION - INPATIENT     Room Number: 567/567-A  Evaluation Date: 7/19/2024  Type of Evaluation: Quick Eval  Presenting Problem: acute kidney injury, leukocytosis, dehydration, acute cystitis  Hx of dementia, DM2, CKD4, HTN, CVA w/ L hemiparesis, dysphagia     Physician Order: IP Consult to Occupational Therapy  Reason for Therapy: ADL/IADL Dysfunction and Discharge Planning    OCCUPATIONAL THERAPY ASSESSMENT   Patient is a 78 year old male admitted 7/17/2024 for acute kidney injury. Patient A&O x1. Prior to admission, patient's baseline is dependent for ADLs and transfers; patient does not ambulate and receives 24 hour assist from staff at [facility] in [place]. Nurse at [facility] reported 2-person assist for all transfers, and that patient is only in bed or in a chair. Patient is currently functioning at baseline with ADLs, functional mobility, and transfers. Anticipate patient will be able to safely return to LTC when medically cleared.    PLAN  Patient has been evaluated and presents with no skilled Occupational Therapy needs  at this time.  Patient will be discharged from Occupational Therapy services. Please re-order if a new functional limitation presents during this admission.    OCCUPATIONAL THERAPY MEDICAL/SOCIAL HISTORY   Problem List  Principal Problem:    KIMBERLEY (acute kidney injury) ([facility])  Active Problems:    Dehydration    Acute cystitis without hematuria    Leukocytosis, unspecified type    Palliative care by specialist    HOME SITUATION  Type of Home: Skilled nursing facility (LTC at [facility] [place] [facility])  Lives With: Staff 24 hours  Drives: No    SUBJECTIVE  \"I have an itch.\"    OCCUPATIONAL THERAPY EXAMINATION    OBJECTIVE  Precautions: Limb alert - right; Bed/chair alarm; Hard of hearing  Fall Risk: High fall risk    PAIN ASSESSMENT  Rating: -- (0)    O2 SATURATIONS  Oxygen Therapy  SPO2% on Oxygen at Rest: 95 (95 at EOB)  At rest oxygen flow (liters per  minute): 6    COGNITION  Overall Cognitive Status:  Impaired  Orientation Level:  oriented to person  Following Commands:  follows one step commands with repetition    Behavioral/Emotional/Social: Patient was pleasant throughout session.    ACTIVITIES OF DAILY LIVING ASSESSMENT  -PAC ‘6-Clicks’ Inpatient Daily Activity Short Form  How much help from another person does the patient currently need…  -   Putting on and taking off regular lower body clothing?: Total  -   Bathing (including washing, rinsing, drying)?: Total  -   Toileting, which includes using toilet, bedpan or urinal? : Total  -   Putting on and taking off regular upper body clothing?: A Lot  -   Taking care of personal grooming such as brushing teeth?: A Lot  -   Eating meals?: A Lot    AM-PAC Score:  Score: 9  Approx Degree of Impairment: 79.59%  Standardized Score (AM-PAC Scale): 25.33  CMS Modifier (G-Code): CL    BED MOBILITY  Rolling to R: mod assist x2  Rolling to L: mod assist  Supine to Sit: mod assist x2  Sit to Supine: mod assist x2  Comments:  Patient was wheezy at EOB. VSS see above. Patient's static sitting balance at EOB was CGA.    ACTIVITIES OF DAILY LIVING  Eating: max assist (per obs)  Grooming: max assist in bed or supported seating (per obs)  UB Dressing: max assist in bed or supported seating (per obs)  LB Dressing: total assist (per obs)  Toileting: total assist (per obs)    EDUCATION PROVIDED  Patient: Role of Occupational Therapy; Plan of Care; Discharge Recommendations; Posture/Positioning; Proper Body Mechanics  Patient's Response to Education: Demonstrates Poor Carry Over to Information; Does Not Demonstrate Skills Needed for Learning    The patient's Approx Degree of Impairment: 79.59% has been calculated based on documentation in the [facility] '6 clicks' Inpatient Daily Activity Short Form.  Research supports that patients with this level of impairment may benefit from Long Term Care. Final disposition will be made by  interdisciplinary medical team.    Patient End of Session: In bed;Needs met;Call light within reach;RN aware of session/findings;All patient questions and concerns addressed;Alarm set    Patient was able to achieve the following ...   Patient able to toilet transfer  at previous functional level    Patient able to dress lower extremities  at previous functional level    Patient/Caregiver able to demonstrate safety with ADLS  at previous functional level     Patient Evaluation Complexity Level:   Occupational Profile/Medical History MODERATE - Expanded review of history including review of medical or therapy record   Specific performance deficits impacting engagement in ADL/IADL MODERATE  3 - 5 performance deficits   Client Assessment/Performance Deficits MODERATE - Comorbidities and min to mod modifications of tasks    Clinical Decision Making MODERATE - Analysis of occupational profile, detailed assessments, several treatment options    Overall Complexity MODERATE     OT Session Time:   Therapeutic Activity: 18 minutes    [facility]  OT Student  ___________________________________________      I provided clinical instruction and supervision for the duration of this session and agree with the above documentation.    Dana Ray OTR/L  [facility]  #[id number]

## 2024-07-19 NOTE — CM/SW NOTE
ALISHA followed up on discharge planning.    Dr. Liana israel/palliative care met with patient and spouse today.  Family would be open to hospice at [facility] [place] if that is the ultimate recommendation from provider team.    Nephrology  following patient.      Per liaison Rebecca with [facility] [place], [facility], [place], [facility] are the contracted hospice agencies at this location.    SW to follow for recommendations as care progresses.  Palliative following case.    PLAN: DC to [facility] [place] LTC    / to remain available for support and/or discharge planning.     Marsha Clinton MSW, LSW [id number]

## 2024-07-19 NOTE — PLAN OF CARE
Patient is alert and oriented per baseline, vital signs stable, no complaints of pain. One time dose lasix provided, remains on supplemental oxygen. Straight cath per order for urinary retention. IV fluids discontinued. Continues on IV antibiotics, no adverse reactions noted. Family at bedside and updated on plan of care. Assisted with care needs by staff. Fall precautions in place, call light visible and within reach.     Problem: Patient Centered Care  Goal: Patient preferences are identified and integrated in the patient's plan of care  Description: Interventions:  - What would you like us to know as we care for you? From [facility] [place]  - Provide timely, complete, and accurate information to patient/family  - Incorporate patient and family knowledge, values, beliefs, and cultural backgrounds into the planning and delivery of care  - Encourage patient/family to participate in care and decision-making at the level they choose  - Honor patient and family perspectives and choices  Outcome: Progressing     Problem: Diabetes/Glucose Control  Goal: Glucose maintained within prescribed range  Description: INTERVENTIONS:  - Monitor Blood Glucose as ordered  - Assess for signs and symptoms of hyperglycemia and hypoglycemia  - Administer ordered medications to maintain glucose within target range  - Assess barriers to adequate nutritional intake and initiate nutrition consult as needed  - Instruct patient on self management of diabetes  Outcome: Progressing     Problem: Patient/Family Goals  Goal: Patient/Family Long Term Goal  Description: Patient's Long Term Goal: To get discharged    Interventions:  - Medications as ordered  - Trend labs  - See additional Care Plan goals for specific interventions  Outcome: Progressing  Goal: Patient/Family Short Term Goal  Description: Patient's Short Term Goal: Skin protection    Interventions:   - Turn in bed  - Protective barrier to prominences  - See additional Care Plan goals  for specific interventions  Outcome: Progressing     Problem: SKIN/TISSUE INTEGRITY - ADULT  Goal: Skin integrity remains intact  Description: INTERVENTIONS  - Assess and document risk factors for pressure ulcer development  - Assess and document skin integrity  - Monitor for areas of redness and/or skin breakdown  - Initiate interventions, skin care algorithm/standards of care as needed  Outcome: Progressing     Problem: NEUROLOGICAL - ADULT  Goal: Achieves stable or improved neurological status  Description: INTERVENTIONS  - Assess for and report changes in neurological status  - Initiate measures to prevent increased intracranial pressure  - Maintain blood pressure and fluid volume within ordered parameters to optimize cerebral perfusion and minimize risk of hemorrhage  - Monitor temperature, glucose, and sodium. Initiate appropriate interventions as ordered  Outcome: Progressing     Problem: Impaired Swallowing  Goal: Minimize aspiration risk  Description: Interventions:  - Patient should be alert and upright for all feedings (90 degrees preferred)  - Offer food and liquids at a slow rate  - No straws  - Encourage small bites of food and small sips of liquid  - Offer pills one at a time, crush or deliver with applesauce as needed  - Discontinue feeding and notify MD (or speech pathologist) if coughing or persistent throat clearing or wet/gurgly vocal quality is noted  Outcome: Progressing

## 2024-07-19 NOTE — PROGRESS NOTES
[facility] Hospitalist Progress Note     CC: Hospital Follow up    PCP: Irene Cedillo MD       Assessment/Plan:     Principal Problem:    KIMBERLEY (acute kidney injury) (HCC)  Active Problems:    Dehydration    Acute cystitis without hematuria    Leukocytosis, unspecified type    Palliative care by specialist      Mr. Eric is a 77 yo M with PMH of dementia, DM2, CKD3, HTN, CVA who presented with abnormal labs, found to have KIMBERLEY on CKD.      KIMBERLEY on CKD4 vs. Progression of CKD  Hyperkalemia  - K 6.7 on admit, repeat K 4.7 without intervention  - Cr 4.61 on admit, , prior Cr 3.0/BUN 50  - renal dose meds  - hold nephrotoxic mediations  - renal consult, appreciate recs  - renal US without obstruction  - initially on IVF, now stopped due to fluid overload    Hypoxia  - CXR with fluid overload  - IV lasix x1 per renal  - wean O2 as able     Anemia  - hg 9.5 on admit, prior Hg 13 a year ago, has been trending down per SNF labs  - check iron studies  -  monitor for GIB, no signs/symptoms of bleeding  - hold ASA/Eliquis  - venofer added    UTI  - started on ceftraixone, continue  - f/u Ucx     Leukocytosis  - 2/2 above  - continue abx     DM2 with hypoglycemia  - home regimen: dapagliflozin- hold  - accuchecks QID, hypoglycemic protocol, SSI  - add D5 to IVF     HTN  - BP low  - hold home meds     CVA  - ASA- hold     Hx DVT  - hold Eliquis with new anemia     Dysphagia  - has been on modified diet with thickened liquids      Vascular Dementia  - lives at [facility]     ACP  - CODE-DNR/select  - POA- wife- updated via phone  - long term resident at [facility]  - palliative care consult, continued GO discussions     FN:  - IVF: stop IVF  - Diet: pureed diet, thickened liquids     DVT Prophy: SCD  Lines: PIV     Dispo: pending clinical course     Outpatient records or previous hospital records reviewed.      Further recommendations pending patient's clinical course.  [facility] hospitalist to continue to follow patient while  in house     Patient and/or patient's family given opportunity to ask questions and note understanding and agreeing with therapeutic plan as outlined     Elsy Jang MD  [facility] Hospitalist  Answering Service number: [phone]     Subjective:     Cr improved but hypoxic this AM, currently on 5L NC    OBJECTIVE:    Blood pressure (!) 132/97, pulse 102, temperature 97.3 °F (36.3 °C), temperature source Axillary, resp. rate 20, height 5' 8\" (1.727 m), weight 142 lb 3.2 oz (64.5 kg), SpO2 92%.    Temp:  [97.3 °F (36.3 °C)-97.8 °F (36.6 °C)] 97.3 °F (36.3 °C)  Pulse:  [] 102  Resp:  [18-22] 20  BP: (102-152)/(60-97) 132/97  SpO2:  [85 %-96 %] 92 %      Intake/Output:    Intake/Output Summary (Last 24 hours) at 7/19/2024 1026  Last data filed at 7/19/2024 0912  Gross per 24 hour   Intake 1410 ml   Output 1000 ml   Net 410 ml       Last 3 Weights   07/18/24 0019 142 lb 3.2 oz (64.5 kg)   07/18/24 0009 142 lb 1.6 oz (64.5 kg)   07/17/24 1545 160 lb (72.6 kg)   12/04/23 1312 169 lb 12.1 oz (77 kg)   09/24/23 1750 170 lb (77.1 kg)       Exam   GEN: elderly male in NAD, more awake today  HEENT: EOMI  Pulm: decreased breath sounds at bases, no increased work of breathing  CV: RRR, no murmurs  ABD: Soft, non-tender, non-distended, +BS  SKIN: warm, dry  EXT: no edema      Data Review:       Labs:     Recent Labs   Lab 07/17/24  1633 07/19/24  0518   RBC 3.10* 2.90*   HGB 9.5* 9.1*   HCT 29.8* 27.2*   MCV 96.1 93.8   MCH 30.6 31.4   MCHC 31.9 33.5   RDW 14.8 14.6   NEPRELIM 11.61*  --    WBC 14.3* 7.4   .0 157.0         Recent Labs   Lab 07/18/24  0517 07/18/24  1633 07/19/24  0518   GLU 71 135* 94   BUN 99* 101* 94*   CREATSERUM 4.18* 4.12* 3.85*   EGFRCR 14* 14* 15*   CA 8.1* 8.1* 8.3*   * 148* 148*   K 3.9 3.4* 3.2*   * 120* 121*   CO2 16.0* 16.0* 16.0*       Recent Labs   Lab 07/17/24  1633   ALT 24   AST 19   ALB 3.7         Imaging:  XR CHEST AP PORTABLE  (CPT=71045)    Result Date:  7/19/2024  CONCLUSION:  Development of findings that are most consistent with moderate CHF/fluid overload including pulmonary edema.    Dictated by (CST): Humberto Macdonald MD on 7/19/2024 at 9:32 AM     Finalized by (CST): Humberto Macdonald MD on 7/19/2024 at 9:34 AM          XR CHEST AP PORTABLE  (CPT=71045)    Result Date: 7/17/2024  CONCLUSION: No acute cardiopulmonary abnormality.     Dictated by (CST): Jacinto Villarreal MD on 7/17/2024 at 6:07 PM     Finalized by (CST): Jacinto Villarreal MD on 7/17/2024 at 6:08 PM             Meds:     INPATIENT MEDICATIONS    Scheduled Medications:      hydrocortisone, , BID  furosemide, 40 mg, Once  insulin aspart, 1-5 Units, TID CC  sodium ferric gluconate, 250 mg, Daily  [START ON 7/20/2024] sodium ferric gluconate, 125 mg, Once  cefTRIAXone, 1 g, Q24H            Drips:         PRN Medications  glucose, 15 g, Q15 Min PRN   Or  glucose, 15 g, Q15 Min PRN   Or  glucose-vitamin C, 4 tablet, Q15 Min PRN   Or  dextrose, 50 mL, Q15 Min PRN   Or  glucose, 30 g, Q15 Min PRN   Or  glucose, 30 g, Q15 Min PRN   Or  glucose-vitamin C, 8 tablet, Q15 Min PRN  acetaminophen, 500 mg, Q4H PRN  ondansetron, 4 mg, Q6H PRN  metoclopramide, 5 mg, Q8H PRN  diphenhydrAMINE, 25 mg, QID PRN

## 2024-07-19 NOTE — PROGRESS NOTES
Palliative Care Progress Note    Cole Eric Patient Status:  Inpatient    1945 MRN [id number]   Location [facility] 5SW/SE Attending Elsy Jang MD   Hosp Day # 1 PCP Irene Cedillo MD     Date of Consult: 2024  Patient seen at: [facility] Inpatient    Reason for Consultation: Consult requested for goals of care and care coordination.    Subjective     S:  Feels fine, no complaints.      Review of Systems: Palliative Care symptom needs assessed:     Unable to obtain     Palliative Care Social History:   Marital Status:   Children: Yes  Living Situation Prior to Admit: LTC  Occupational History: Retired  Personal:     Spiritual  Cole Eric  NA     requested: No    Medical History: obtained from [facility]  Past Medical History:    Back pain    Back problem    BPH (benign prostatic hyperplasia)    bph    Chest pain    chest pain on exertion    Deep vein thrombosis (HCC)    Dementia (HCC)    Diabetes (HCC)    Fatigue    chronic fatigue    GERD (gastroesophageal reflux disease)    gerd    Gout    chronic gout    High cholesterol    History of stomach ulcers    HTN    Incontinence    Neuropathic pain    neuropathic pain of both legs    PVD (peripheral vascular disease) (HCC)    pvd    Stroke (HCC)     Past Surgical History:   Procedure Laterality Date    Back surgery      Cath bare metal stent (bms)         Family History: obtained from [facility]  Family History   Problem Relation Age of Onset    Heart Disorder Father     Cancer Father     Heart Disorder Mother     Cancer Mother         uterine    Cancer Sister     Diabetes Sister     Cancer Brother     Cancer Brother        Allergies:  No Known Allergies    Medications:     Current Facility-Administered Medications:     hydrocortisone 1 % cream, , Topical, BID    glucose (Dex4) 15 GM/59ML oral liquid 15 g, 15 g, Oral, Q15 Min PRN **OR** glucose (Glutose) 40% oral gel 15 g, 15 g, Oral, Q15 Min PRN **OR** glucose-vitamin C  (Dex-4) chewable tab 4 tablet, 4 tablet, Oral, Q15 Min PRN **OR** dextrose 50% injection 50 mL, 50 mL, Intravenous, Q15 Min PRN **OR** glucose (Dex4) 15 GM/59ML oral liquid 30 g, 30 g, Oral, Q15 Min PRN **OR** glucose (Glutose) 40% oral gel 30 g, 30 g, Oral, Q15 Min PRN **OR** glucose-vitamin C (Dex-4) chewable tab 8 tablet, 8 tablet, Oral, Q15 Min PRN    acetaminophen (Tylenol Extra Strength) tab 500 mg, 500 mg, Oral, Q4H PRN    ondansetron (Zofran) 4 MG/2ML injection 4 mg, 4 mg, Intravenous, Q6H PRN    metoclopramide (Reglan) 5 mg/mL injection 5 mg, 5 mg, Intravenous, Q8H PRN    insulin aspart (NovoLOG) 100 Units/mL FlexPen 1-5 Units, 1-5 Units, Subcutaneous, TID CC    diphenhydrAMINE (Benadryl) 12.5 MG/5ML oral liquid 25 mg, 25 mg, Oral, QID PRN    [START ON 7/20/2024] sodium ferric gluconate (Ferrlecit) 125 mg in sodium chloride 0.9% 100mL IVPB premix, 125 mg, Intravenous, Once    cefTRIAXone (Rocephin) 1 g in sodium chloride 0.9% 100 mL IVPB-ADDV, 1 g, Intravenous, Q24H  No current outpatient medications on file.         Palliative Performance Scale: 30 %  % Ambulation Activity Level Self-Care Intake Consciousness   100 Full  Normal  No Disease Full Normal Full   90 Full  Normal  Some Disease Full Normal Full   80 Full  Normal w/effort  Some Disease Full Normal or reduced Full   70 Reduced  Can't Perform Job  Some Disease Full Normal or reduced Full   60 Reduced  Can't Perform Hobby   Significant Disease Occ Assist Normal or reduced Full or confused   50 Mainly sit/lie Can't do any work  Extensive Disease Partial Assist Normal or reduced Full or confused   40 Mainly in bed Can't do any work  Extensive Disease Mainly Assist Normal or reduced Full or confused   30 Bed Bound Can't do any work  Extensive Disease Max Assist  Total Care Reduced  Drowsy/confused   20 Bed Bound Can't do any work  Extensive Disease Max Assist  Total Care Minimal  Drowsy/confused   10 Bed Bound Can't do any work  Extensive Disease Max  Assist  Total Care Mouth Care  Drowsy/confused   0 Death        Objective      Vital Signs:  Blood pressure (!) 132/97, pulse 102, temperature 97.3 °F (36.3 °C), temperature source Axillary, resp. rate 20, height 5' 8\" (1.727 m), weight 142 lb 3.2 oz (64.5 kg), SpO2 92%.  Body mass index is 21.62 kg/m².  Non-verbal signs of pain present: No    Physical Exam:  General: Alert, awake and in no  apparent respiratory distress.  HEENT: Facial droop  Cardiac: RRR  Lungs: Normal effort on RA  Abdomen: Soft, non-tender, non-distended, normal bowel sounds X 4 quadrants   Extremities: FROM of all limbs, mild-mod Edema present  Skin: Warm and dry.    Hematology:  Lab Results   Component Value Date    WBC 7.4 07/19/2024    HGB 9.1 (L) 07/19/2024    HCT 27.2 (L) 07/19/2024    .0 07/19/2024       Coags:  Lab Results   Component Value Date    INR 1.23 (H) 07/19/2024    PTT 34.0 04/07/2017       Chemistry:  Lab Results   Component Value Date    CREATSERUM 3.85 (H) 07/19/2024    BUN 94 (H) 07/19/2024     (H) 07/19/2024    K 3.2 (L) 07/19/2024     (H) 07/19/2024    CO2 16.0 (L) 07/19/2024    GLU 94 07/19/2024    CA 8.3 (L) 07/19/2024    ALB 3.7 07/17/2024    ALKPHO 114 07/17/2024    BILT 0.3 07/17/2024    TP 6.5 07/17/2024    AST 19 07/17/2024    ALT 24 07/17/2024    MG 1.8 07/17/2024    PHOS 3.6 04/12/2017    TROP 0.07 (HH) 04/07/2017       Imaging:  XR CHEST AP PORTABLE  (CPT=71045)    Result Date: 7/19/2024  CONCLUSION:  Development of findings that are most consistent with moderate CHF/fluid overload including pulmonary edema.    Dictated by (CST): Humberto Macdonald MD on 7/19/2024 at 9:32 AM     Finalized by (CST): Humberto Macdonald MD on 7/19/2024 at 9:34 AM          XR CHEST AP PORTABLE  (CPT=71045)    Result Date: 7/17/2024  CONCLUSION: No acute cardiopulmonary abnormality.     Dictated by (CST): Jacinto Villarreal MD on 7/17/2024 at 6:07 PM     Finalized by (CST): Jacinto Villarreal MD on 7/17/2024 at 6:08 PM            Assessment and Recommendations        KIMBERLEY (acute kidney injury) (HCC)    Dehydration    Acute cystitis without hematuria    Leukocytosis, unspecified type        Symptom Management        Pruritis:  -has been having back/arm pruritus since living at LTC  -will add benadryl prn  -will discuss workup w Dr. Jang      2.     Serious Illness Conversation:   Code Status: DNR/Select  POA: Yes, wife Fernandez (pronounced like Anne-Marie)  7/18: I met with POA and son Cole at bedside. Patient was at xray for majority of my discussion. They feel that they have been supported and well-informed regarding his renal function and offered medical therapies for his acute illnesses.  Fernandez and he have discussed advanced directives and agreed that he would most likely NOT want to be kept alive on hemodialysis however they are also taking things one day at a time. I think this is very appropriate and am confident they have a good family internal-support system to help with coping and decision-making.  I will be following along to discuss this and other decisions as they arise including correction care and nutrition.  7/19: I saw Fernandez and his daughter at bedside. We discussed if his condition worsened, there are care options for comfort at end of life. They reminded me that he lives in a longterm care facility so I will ask SW to see which hospice agencies work with his facility. They were understanding and will probably choose to enroll him if that is our ultimate recommendation.  I will defer to nephrology to advise on his prognosis but I suspect he meets criteria for hospice now.     Palliative Care Follow Up: Palliative care team will Continue to follow while inpatient    Thank you for allowing Palliative Care services to participate in the care of Cole Eric.    A total of 40 minutes were spent on this visit, which included all of the following: direct face to face contact, history taking, physical examination, and >50%  was spent counseling and coordinating care.    Christopher Sosa MD  Palliative Care Services  [facility] [phone]    Note to patient:  The 21st Century Cures Act makes medical notes like these available to patients in the interest of transparency. However, be advised this is a medical document. It is intended as peer to peer communication. It is written in medical language and may contain abbreviations or verbiage that are unfamiliar. It may appear blunt or direct. Medical documents are intended to carry relevant information, facts as evident, and the clinical opinion of the practitioner.

## 2024-07-19 NOTE — PLAN OF CARE
Pt. A/Ox2, on RA, VSS. IVF infusing. No acute events. Pt educated on call light use, within reach. Safety measures in place.     Problem: Patient Centered Care  Goal: Patient preferences are identified and integrated in the patient's plan of care  Description: Interventions:  - What would you like us to know as we care for you? From [facility]  - Provide timely, complete, and accurate information to patient/family  - Incorporate patient and family knowledge, values, beliefs, and cultural backgrounds into the planning and delivery of care  - Encourage patient/family to participate in care and decision-making at the level they choose  - Honor patient and family perspectives and choices  Outcome: Progressing     Problem: Diabetes/Glucose Control  Goal: Glucose maintained within prescribed range  Description: INTERVENTIONS:  - Monitor Blood Glucose as ordered  - Assess for signs and symptoms of hyperglycemia and hypoglycemia  - Administer ordered medications to maintain glucose within target range  - Assess barriers to adequate nutritional intake and initiate nutrition consult as needed  - Instruct patient on self management of diabetes  Outcome: Progressing     Problem: Patient/Family Goals  Goal: Patient/Family Long Term Goal  Description: Patient's Long Term Goal: To get discharged    Interventions:  - Medications as ordered  - Trend labs  - See additional Care Plan goals for specific interventions  Outcome: Progressing  Goal: Patient/Family Short Term Goal  Description: Patient's Short Term Goal: Skin protection    Interventions:   - Turn in bed  - Protective barrier to prominences  - See additional Care Plan goals for specific interventions  Outcome: Progressing     Problem: SKIN/TISSUE INTEGRITY - ADULT  Goal: Skin integrity remains intact  Description: INTERVENTIONS  - Assess and document risk factors for pressure ulcer development  - Assess and document skin integrity  - Monitor for areas of redness  and/or skin breakdown  - Initiate interventions, skin care algorithm/standards of care as needed  Outcome: Progressing     Problem: NEUROLOGICAL - ADULT  Goal: Achieves stable or improved neurological status  Description: INTERVENTIONS  - Assess for and report changes in neurological status  - Initiate measures to prevent increased intracranial pressure  - Maintain blood pressure and fluid volume within ordered parameters to optimize cerebral perfusion and minimize risk of hemorrhage  - Monitor temperature, glucose, and sodium. Initiate appropriate interventions as ordered  Outcome: Progressing     Problem: Impaired Swallowing  Goal: Minimize aspiration risk  Description: Interventions:  - Patient should be alert and upright for all feedings (90 degrees preferred)  - Offer food and liquids at a slow rate  - No straws  - Encourage small bites of food and small sips of liquid  - Offer pills one at a time, crush or deliver with applesauce as needed  - Discontinue feeding and notify MD (or speech pathologist) if coughing or persistent throat clearing or wet/gurgly vocal quality is noted  Outcome: Progressing

## 2024-07-19 NOTE — PHYSICAL THERAPY NOTE
PHYSICAL THERAPY EVALUATION - INPATIENT     Room Number: 567/567-A  Evaluation Date: 7/19/2024  Type of Evaluation: Initial   Physician Order: PT Eval and Treat    Presenting Problem: Acute kidney injury  Co-Morbidities : Hx of dementia, DM2, CKD3, HTN, CVA  Reason for Therapy: Mobility Dysfunction and Discharge Planning    PHYSICAL THERAPY ASSESSMENT   Patient is a 78 year old male admitted 7/17/2024 for abnormal blood tests.  Prior to admission, patient's baseline is assist from staff at facility for all ADLs and two person assist for transfers out of bed. Utilizes a wheelchair for transport with assist from staff. Patient is currently functioning at baseline with bed mobility, transfers, and maintaining seated position.    PLAN  Patient has been evaluated and presents with no skilled Physical Therapy needs at this time.  Patient will be discharged from Physical Therapy services. Please re-order if a new functional limitation presents during this admission.    PHYSICAL THERAPY MEDICAL/SOCIAL HISTORY   Problem List  Principal Problem:    KIMBERLEY (acute kidney injury) ([facility])  Active Problems:    Dehydration    Acute cystitis without hematuria    Leukocytosis, unspecified type    Palliative care by specialist      HOME SITUATION  Home Situation  Type of Home: Skilled nursing facility (LTC at [facility])  Lives With: Staff 24 hours  Drives: No     SUBJECTIVE  \"I live with my wife\"    PHYSICAL THERAPY EXAMINATION   OBJECTIVE  Precautions: Limb alert - right;Bed/chair alarm;Hard of hearing  Fall Risk: High fall risk    WEIGHT BEARING RESTRICTION  Weight Bearing Restriction: None    PAIN ASSESSMENT  Rating: Unable to rate (confused)    COGNITION  Orientation Level:  oriented to person, disoriented to place, disoriented to time, and disoriented to situation    RANGE OF MOTION AND STRENGTH ASSESSMENT  Upper extremity ROM and strength are within functional limits: LUE limited in ROM and weak  Lower extremity  ROM is within functional limits except for the following: LLE limited  Lower extremity strength is within functional limits except for the following:  LLE limited due to baseline left hemiparesis    BALANCE  Static Sitting: Fair +  Dynamic Sitting: Fair  Static Standing: Not tested  Dynamic Standing: Not tested    O2 WALK  Oxygen Therapy  SPO2% on Oxygen at Rest: 95 (at rest and sitting at EOB)  At rest oxygen flow (liters per minute): 6    AM-PAC '6-Clicks' INPATIENT SHORT FORM - BASIC MOBILITY  How much difficulty does the patient currently have...  Patient Difficulty: Turning over in bed (including adjusting bedclothes, sheets and blankets)?: A Lot   Patient Difficulty: Sitting down on and standing up from a chair with arms (e.g., wheelchair, bedside commode, etc.): A Lot   Patient Difficulty: Moving from lying on back to sitting on the side of the bed?: A Lot   How much help from another person does the patient currently need...   Help from Another: Moving to and from a bed to a chair (including a wheelchair)?: A Lot   Help from Another: Need to walk in hospital room?: A Lot   Help from Another: Climbing 3-5 steps with a railing?: Total     AM-PAC Score:  Raw Score: 11   Approx Degree of Impairment: 72.57%   Standardized Score (AM-PAC Scale): 33.86   CMS Modifier (G-Code): CL    FUNCTIONAL ABILITY STATUS  Functional Mobility/Gait Assessment  Gait Assistance: Not tested  Rolling: moderate assist. Mod A x2 when rolling to his right due to L hemiparesis. Mod A x1 when rolling to his left, pt utilizes RUE. Assist for trunk management.   Supine to Sit: moderate assistx2. Assistance in managing LEs off EOB and trunk stability. Pt tolerated 3 minutes of static sitting with CGA. Limited by pt wheezing after activity.   Sit to Supine: moderate assistx2. Assistance in managing LEs and trunk. Assist required for repositioning once supine.     Exercise/Education Provided:  Bed mobility  Body mechanics  Functional activity  tolerated    Pt received lying supine in bed. RN approved activity. Pt agreeable to participation. Activity limited and out of bed transfer limited due to wheezing at EOB. Pt requesting to lay back down.     The patient's Approx Degree of Impairment: 72.57% has been calculated based on documentation in the [facility] '6 clicks' Inpatient Basic Mobility Short Form.  Research supports that patients with this level of impairment may benefit from return to LTC.  Final disposition will be made by interdisciplinary medical team.    Patient End of Session: Up in chair;Needs met;Call light within reach;RN aware of session/findings;All patient questions and concerns addressed;Alarm set    Patient was able to achieve the following ...   Patient able to transfer  At previous, functional level  Assist x2 at baseline    Patient able to ambulate on level surfaces  Unable before admission     Patient Evaluation Complexity Level:  History Moderate - 1 or 2 personal factors and/or co-morbidities   Examination of body systems Moderate - addressing a total of 3 or more elements   Clinical Presentation  Moderate - Evolving   Clinical Decision Making  Moderate Complexity     Therapeutic Activity: 9 min    [facility]  DPT Student     I provided clinical instruction and supervision for the duration of this session and agree with the above documentation.     Jenna Haase, PT, DPT  [facility]  Ext: [phone]

## 2024-07-20 LAB
ANION GAP SERPL CALC-SCNC: 11 MMOL/L (ref 0–18)
BUN BLD-MCNC: 90 MG/DL (ref 9–23)
BUN/CREAT SERPL: 24 (ref 10–20)
CALCIUM BLD-MCNC: 8.9 MG/DL (ref 8.7–10.4)
CHLORIDE SERPL-SCNC: 121 MMOL/L (ref 98–112)
CO2 SERPL-SCNC: 17 MMOL/L (ref 21–32)
CREAT BLD-MCNC: 3.75 MG/DL
EGFRCR SERPLBLD CKD-EPI 2021: 16 ML/MIN/1.73M2 (ref 60–?)
GLUCOSE BLD-MCNC: 90 MG/DL (ref 70–99)
GLUCOSE BLDC GLUCOMTR-MCNC: 107 MG/DL (ref 70–99)
GLUCOSE BLDC GLUCOMTR-MCNC: 111 MG/DL (ref 70–99)
GLUCOSE BLDC GLUCOMTR-MCNC: 76 MG/DL (ref 70–99)
GLUCOSE BLDC GLUCOMTR-MCNC: 97 MG/DL (ref 70–99)
OSMOLALITY SERPL CALC.SUM OF ELEC: 335 MOSM/KG (ref 275–295)
POTASSIUM SERPL-SCNC: 3.5 MMOL/L (ref 3.5–5.1)
SODIUM SERPL-SCNC: 149 MMOL/L (ref 136–145)

## 2024-07-20 RX ORDER — POTASSIUM CHLORIDE 1.5 G/1.58G
40 POWDER, FOR SOLUTION ORAL ONCE
Status: COMPLETED | OUTPATIENT
Start: 2024-07-20 | End: 2024-07-20

## 2024-07-20 RX ORDER — POTASSIUM CHLORIDE 20 MEQ/1
40 TABLET, EXTENDED RELEASE ORAL ONCE
Status: DISCONTINUED | OUTPATIENT
Start: 2024-07-20 | End: 2024-07-20

## 2024-07-20 RX ORDER — FUROSEMIDE 10 MG/ML
40 INJECTION INTRAMUSCULAR; INTRAVENOUS ONCE
Status: COMPLETED | OUTPATIENT
Start: 2024-07-20 | End: 2024-07-20

## 2024-07-20 NOTE — PLAN OF CARE
Problem: Patient Centered Care  Goal: Patient preferences are identified and integrated in the patient's plan of care  Description: Interventions:  - What would you like us to know as we care for you? From [facility]  - Provide timely, complete, and accurate information to patient/family  - Incorporate patient and family knowledge, values, beliefs, and cultural backgrounds into the planning and delivery of care  - Encourage patient/family to participate in care and decision-making at the level they choose  - Honor patient and family perspectives and choices  Outcome: Progressing     Problem: Diabetes/Glucose Control  Goal: Glucose maintained within prescribed range  Description: INTERVENTIONS:  - Monitor Blood Glucose as ordered  - Assess for signs and symptoms of hyperglycemia and hypoglycemia  - Administer ordered medications to maintain glucose within target range  - Assess barriers to adequate nutritional intake and initiate nutrition consult as needed  - Instruct patient on self management of diabetes  Outcome: Progressing     Problem: SKIN/TISSUE INTEGRITY - ADULT  Goal: Skin integrity remains intact  Description: INTERVENTIONS  - Assess and document risk factors for pressure ulcer development  - Assess and document skin integrity  - Monitor for areas of redness and/or skin breakdown  - Initiate interventions, skin care algorithm/standards of care as needed  Outcome: Progressing     Problem: NEUROLOGICAL - ADULT  Goal: Achieves stable or improved neurological status  Description: INTERVENTIONS  - Assess for and report changes in neurological status  - Initiate measures to prevent increased intracranial pressure  - Maintain blood pressure and fluid volume within ordered parameters to optimize cerebral perfusion and minimize risk of hemorrhage  - Monitor temperature, glucose, and sodium. Initiate appropriate interventions as ordered  Outcome: Progressing     Problem: Impaired Swallowing  Goal:  Minimize aspiration risk  Description: Interventions:  - Patient should be alert and upright for all feedings (90 degrees preferred)  - Offer food and liquids at a slow rate  - No straws  - Encourage small bites of food and small sips of liquid  - Offer pills one at a time, crush or deliver with applesauce as needed  - Discontinue feeding and notify MD (or speech pathologist) if coughing or persistent throat clearing or wet/gurgly vocal quality is noted  Outcome: Progressing     Patient A/Ox1-2, resting in bed, q2 turns,

## 2024-07-20 NOTE — PLAN OF CARE
Problem: Patient Centered Care  Goal: Patient preferences are identified and integrated in the patient's plan of care  Description: Interventions:  - What would you like us to know as we care for you? From [facility]  - Provide timely, complete, and accurate information to patient/family  - Incorporate patient and family knowledge, values, beliefs, and cultural backgrounds into the planning and delivery of care  - Encourage patient/family to participate in care and decision-making at the level they choose  - Honor patient and family perspectives and choices  Outcome: Progressing     Problem: Diabetes/Glucose Control  Goal: Glucose maintained within prescribed range  Description: INTERVENTIONS:  - Monitor Blood Glucose as ordered  - Assess for signs and symptoms of hyperglycemia and hypoglycemia  - Administer ordered medications to maintain glucose within target range  - Assess barriers to adequate nutritional intake and initiate nutrition consult as needed  - Instruct patient on self management of diabetes  Outcome: Progressing     Problem: Patient/Family Goals  Goal: Patient/Family Long Term Goal  Description: Patient's Long Term Goal: To get discharged    Interventions:  - Medications as ordered  - Trend labs  - See additional Care Plan goals for specific interventions  Outcome: Progressing  Goal: Patient/Family Short Term Goal  Description: Patient's Short Term Goal: Skin protection    Interventions:   - Turn in bed  - Protective barrier to prominences  - See additional Care Plan goals for specific interventions  Outcome: Progressing     Problem: SKIN/TISSUE INTEGRITY - ADULT  Goal: Skin integrity remains intact  Description: INTERVENTIONS  - Assess and document risk factors for pressure ulcer development  - Assess and document skin integrity  - Monitor for areas of redness and/or skin breakdown  - Initiate interventions, skin care algorithm/standards of care as needed  Outcome: Progressing      Problem: NEUROLOGICAL - ADULT  Goal: Achieves stable or improved neurological status  Description: INTERVENTIONS  - Assess for and report changes in neurological status  - Initiate measures to prevent increased intracranial pressure  - Maintain blood pressure and fluid volume within ordered parameters to optimize cerebral perfusion and minimize risk of hemorrhage  - Monitor temperature, glucose, and sodium. Initiate appropriate interventions as ordered  Outcome: Progressing     Problem: Impaired Swallowing  Goal: Minimize aspiration risk  Description: Interventions:  - Patient should be alert and upright for all feedings (90 degrees preferred)  - Offer food and liquids at a slow rate  - No straws  - Encourage small bites of food and small sips of liquid  - Offer pills one at a time, crush or deliver with applesauce as needed  - Discontinue feeding and notify MD (or speech pathologist) if coughing or persistent throat clearing or wet/gurgly vocal quality is noted  Outcome: Progressing    Alert and oriented per baseline. Additional IV lasix ordered d/t worsening crackles. Adequate urine output maintained- see flowsheet.  On 7L high flow 02. Safety and fall precautions maintained- bed alarm on, bed locked in lowest position, call light within reach. Frequent rounding by nursing staff.

## 2024-07-20 NOTE — PROGRESS NOTES
[facility] Hospitalist Progress Note     CC: Hospital Follow up    PCP: Irene Cedillo MD       Assessment/Plan:     Principal Problem:    KIMBERLEY (acute kidney injury) (HCC)  Active Problems:    Dehydration    Acute cystitis without hematuria    Leukocytosis, unspecified type    Palliative care by specialist      Mr. Eric is a 79 yo M with PMH of dementia, DM2, CKD3, HTN, CVA who presented with abnormal labs, found to have KIMBERLEY on CKD.  Worsening hypoxia noted due to fluid overload.  Patient with progression of chronic kidney disease.  Monitor response to Lasix but suspect may worsen renal function.  Patient is a poor dialysis candidate.  Will continue to discuss goals of care with family.       KIMBERLEY on CKD4 vs. Progression of CKD  Hyperkalemia  - K 6.7 on admit, repeat K 4.7 without intervention  - Cr 4.61 on admit, , prior Cr 3.0/BUN 50  - renal dose meds  - hold nephrotoxic mediations  - renal consult, appreciate recs  - renal US without obstruction  - initially on IVF, now stopped due to fluid overload    Hypoxia  -worsened early 7/20 but improved s/p lasix, down to 3L  - CXR with fluid overload  - IV lasix x1 over night, further dosing per renal   - wean O2 as able     Anemia  - hg 9.5 on admit, prior Hg 13 a year ago, has been trending down per SNF labs  - check iron studies  -  monitor for GIB, no signs/symptoms of bleeding  - hold ASA/Eliquis  - stable at 9.1, recheck in AM   - venofer added    UTI  - started on ceftraixone, continue  - f/u Ucx, proteus, R to FQ, macrobid      Leukocytosis, resolved on 7/19  - 2/2 above  - continue abx     DM2 with hypoglycemia  - home regimen: dapagliflozin- hold  - accuchecks QID, hypoglycemic protocol, SSI  - add D5 to IVF     HTN  - BP low  - hold home meds     CVA  - ASA- hold     Hx DVT  - hold Eliquis with new anemia     Dysphagia  - has been on modified diet with thickened liquids      Vascular Dementia  - lives at [facility]     ACP  - CODE-DNR/select  - POA-  wife- updated via phone  - long term resident at [facility]  - palliative care consult, continued GOC discussions     FN:  - IVF: stop IVF  - Diet: pureed diet, thickened liquids     DVT Prophy: SCD  Lines: PIV     Dispo: pending clinical course        Further recommendations pending patient's clinical course.  [facility] hospitalist to continue to follow patient while in house     Patient and/or patient's family given opportunity to ask questions and note understanding and agreeing with therapeutic plan as outlined    Note: This chart was prepared using voice recognition software and may contain unintended word substitution errors.     Dw wife over the phone.      Dominique Jarvis MD  Hospitalist  [facility]   Answering service: [phone]         Subjective:     Worsening hypoxia in early AM.   given Lasix and improved.  Renal function stable.  Afebrile.  Patient without any acute complaints.    OBJECTIVE:    Blood pressure 136/89, pulse 100, temperature 97.8 °F (36.6 °C), temperature source Axillary, resp. rate 22, height 5' 8\" (1.727 m), weight 142 lb 3.2 oz (64.5 kg), SpO2 93%.    Temp:  [97.3 °F (36.3 °C)-97.8 °F (36.6 °C)] 97.8 °F (36.6 °C)  Pulse:  [] 100  Resp:  [19-22] 22  BP: (112-140)/(79-97) 136/89  SpO2:  [85 %-95 %] 93 %      Intake/Output:    Intake/Output Summary (Last 24 hours) at 7/20/2024 0801  Last data filed at 7/20/2024 0014  Gross per 24 hour   Intake 600 ml   Output 1800 ml   Net -1200 ml       Last 3 Weights   07/18/24 0019 142 lb 3.2 oz (64.5 kg)   07/18/24 0009 142 lb 1.6 oz (64.5 kg)   07/17/24 1545 160 lb (72.6 kg)   12/04/23 1312 169 lb 12.1 oz (77 kg)   09/24/23 1750 170 lb (77.1 kg)       Exam   GEN: elderly male in NAD, alert, cooperative, confused   HEENT: EOMI  Pulm: decreased breath sounds at bases, no increased work of breathing  CV: RRR, no murmurs  ABD: Soft, non-tender, non-distended, +BS  SKIN: warm, dry  EXT: no edema      Data Review:       Labs:     Recent  Labs   Lab 07/17/24  1633 07/19/24  0518   RBC 3.10* 2.90*   HGB 9.5* 9.1*   HCT 29.8* 27.2*   MCV 96.1 93.8   MCH 30.6 31.4   MCHC 31.9 33.5   RDW 14.8 14.6   NEPRELIM 11.61*  --    WBC 14.3* 7.4   .0 157.0         Recent Labs   Lab 07/18/24  1633 07/19/24  0518 07/20/24  0500   * 94 90   * 94* 90*   CREATSERUM 4.12* 3.85* 3.75*   EGFRCR 14* 15* 16*   CA 8.1* 8.3* 8.9   * 148* 149*   K 3.4* 3.2* 3.5   * 121* 121*   CO2 16.0* 16.0* 17.0*       Recent Labs   Lab 07/17/24  1633   ALT 24   AST 19   ALB 3.7         Imaging:  XR CHEST AP PORTABLE  (CPT=71045)    Result Date: 7/19/2024  CONCLUSION:  Development of findings that are most consistent with moderate CHF/fluid overload including pulmonary edema.    Dictated by (CST): Humberto Macdonald MD on 7/19/2024 at 9:32 AM     Finalized by (CST): Humberto Macdonald MD on 7/19/2024 at 9:34 AM          XR CHEST AP PORTABLE  (CPT=71045)    Result Date: 7/17/2024  CONCLUSION: No acute cardiopulmonary abnormality.     Dictated by (CST): Jacinto Villarreal MD on 7/17/2024 at 6:07 PM     Finalized by (CST): Jacinto Villarreal MD on 7/17/2024 at 6:08 PM             Meds:     INPATIENT MEDICATIONS    Scheduled Medications:      hydrocortisone, , BID  insulin aspart, 1-5 Units, TID CC  sodium ferric gluconate, 125 mg, Once  cefTRIAXone, 1 g, Q24H            Drips:         PRN Medications  hydrOXYzine, 25 mg, TID PRN  glucose, 15 g, Q15 Min PRN   Or  glucose, 15 g, Q15 Min PRN   Or  glucose-vitamin C, 4 tablet, Q15 Min PRN   Or  dextrose, 50 mL, Q15 Min PRN   Or  glucose, 30 g, Q15 Min PRN   Or  glucose, 30 g, Q15 Min PRN   Or  glucose-vitamin C, 8 tablet, Q15 Min PRN  acetaminophen, 500 mg, Q4H PRN  ondansetron, 4 mg, Q6H PRN  metoclopramide, 5 mg, Q8H PRN  diphenhydrAMINE, 25 mg, QID PRN

## 2024-07-20 NOTE — HOSPICE RN NOTE
[facility] received referral. Will follow up with patient and family at bedside or via telephone.    1015: Attempted to call the patient's spouse. No answer. LVM. Notified RN to call if family arrives at bedside.    1530: Visited patient at bedside. Patient alert and oriented to self. Calm, but confused. Will need to complete IH with family/caregivers present.    AMADO Carrion, RN, PN  [facility] RN Liaison  [phone] (After-hours)

## 2024-07-20 NOTE — PROGRESS NOTES
NEPHROLOGY DAILY PROGRESS NOTE     SUBJECTIVE:  Worsening respiratory status early this AM and required 40 IV lasix   States he is not feeling well today   Reports feeling SOB, currently on 3L of oxygen      OBJECTIVE:    Total Intake/Output:    Intake/Output Summary (Last 24 hours) at 7/20/2024 1133  Last data filed at 7/20/2024 0930  Gross per 24 hour   Intake 960 ml   Output 2350 ml   Net -1390 ml         PHYSICAL EXAM:  /86 (BP Location: Left arm)   Pulse 101   Temp 97.8 °F (36.6 °C) (Axillary)   Resp 22   Ht 5' 8\" (1.727 m)   Wt 142 lb 3.2 oz (64.5 kg)   SpO2 92%   BMI 21.62 kg/m²   GEN: on oxygen, tachypnic    HEENT: NCAT     CHEST:  decreased breath sounds   CARDIAC: S1S2 normal  ABD: soft, NT/ND  EXT:  no lower ext edema  NEURO: awake, alert  SKIN: warm, dr        CURRENT MEDICATIONS:  Current Facility-Administered Medications   Medication Dose Route Frequency    hydrocortisone 1 % cream   Topical BID    hydrOXYzine (Atarax) tab 25 mg  25 mg Oral TID PRN    glucose (Dex4) 15 GM/59ML oral liquid 15 g  15 g Oral Q15 Min PRN    Or    glucose (Glutose) 40% oral gel 15 g  15 g Oral Q15 Min PRN    Or    glucose-vitamin C (Dex-4) chewable tab 4 tablet  4 tablet Oral Q15 Min PRN    Or    dextrose 50% injection 50 mL  50 mL Intravenous Q15 Min PRN    Or    glucose (Dex4) 15 GM/59ML oral liquid 30 g  30 g Oral Q15 Min PRN    Or    glucose (Glutose) 40% oral gel 30 g  30 g Oral Q15 Min PRN    Or    glucose-vitamin C (Dex-4) chewable tab 8 tablet  8 tablet Oral Q15 Min PRN    acetaminophen (Tylenol Extra Strength) tab 500 mg  500 mg Oral Q4H PRN    ondansetron (Zofran) 4 MG/2ML injection 4 mg  4 mg Intravenous Q6H PRN    metoclopramide (Reglan) 5 mg/mL injection 5 mg  5 mg Intravenous Q8H PRN    insulin aspart (NovoLOG) 100 Units/mL FlexPen 1-5 Units  1-5 Units Subcutaneous TID CC    diphenhydrAMINE (Benadryl) 12.5 MG/5ML oral liquid 25 mg  25 mg Oral QID PRN    cefTRIAXone (Rocephin) 1 g in sodium chloride  0.9% 100 mL IVPB-ADDV  1 g Intravenous Q24H       LABS:  Patient Labs Reviewed in Detail. Pertinent Labs as follows:  Recent Labs   Lab 07/18/24  1633 07/19/24  0518 07/20/24  0500   * 94 90   * 94* 90*   CREATSERUM 4.12* 3.85* 3.75*   EGFRCR 14* 15* 16*   CA 8.1* 8.3* 8.9   * 148* 149*   K 3.4* 3.2* 3.5   * 121* 121*   CO2 16.0* 16.0* 17.0*     Recent Labs   Lab 07/17/24  1633 07/19/24  0518   RBC 3.10* 2.90*   HGB 9.5* 9.1*   HCT 29.8* 27.2*   MCV 96.1 93.8   MCH 30.6 31.4   MCHC 31.9 33.5   RDW 14.8 14.6   NEPRELIM 11.61*  --    WBC 14.3* 7.4   .0 157.0         IMAGING:  XR CHEST AP PORTABLE  (CPT=71045)    Result Date: 7/19/2024  CONCLUSION:  Development of findings that are most consistent with moderate CHF/fluid overload including pulmonary edema.    Dictated by (CST): Humberto Macdonald MD on 7/19/2024 at 9:32 AM     Finalized by (CST): Humberto Macdonald MD on 7/19/2024 at 9:34 AM            ASSESSMENT AND PLAN:   This is a 78 year old male with PMH sig for dementia, DM2, HTN, CVA and CKD stage 3-4.  Presented with abnormal labs. Nephrology is consulted for KIMBERLEY.      Non-oliguric KIMBERLEY on CKD stage 4   - baseline sCr ~ 2.7-3.1   - sCr 4.61 on admission   - renal US neg for hydronephrosis, atrophied R kidney   - suspect pre-renal KIMBERLEY and was started on IVFs.  Developed fluid overload and IVFs now stopped   - KIMBERLEY improving to 3.75 today    - follow renal fxn and I/Os   - no acute indication for RRT, however agree that patient is not a good dialysis candidate.     Acute hypoxic respiratory failure   Volume overload / pulm edema    - s/p 40 IV lasix 7/19, 7/20    - will give another 40 IV lasix this afternoon  + 40 meq of KCl   - oxygen requirements improving     Hyperkalemia   - resolved      Acidosis   - due to KIMBERLEY   - mildly improved at 17     Hypernatremia    - Na 149, encourage PO intake of free water  - monitor with diuresis   - follow Na level, may need D5W if worsening       Anemia   Iron deficiency   - received dose of ferrlecit 7/20   - blood transfusions per primary      Dispo: patient / family to meet with hospice      Dw RN    We will continue to follow Cole Artis MD  [facility] - Nephrology

## 2024-07-20 NOTE — CM/SW NOTE
Sw received MDO for hospice consult. ALISHA called [facility], spoke with Cole, who was made aware of the consult order place.     ALISHA/CM to remain available for support and/or discharge planning.     Anahi Sutton MSW, LSW   x [phone]

## 2024-07-21 LAB
ALBUMIN SERPL-MCNC: 3.7 G/DL (ref 3.2–4.8)
ANION GAP SERPL CALC-SCNC: 12 MMOL/L (ref 0–18)
BASOPHILS # BLD AUTO: 0.02 X10(3) UL (ref 0–0.2)
BASOPHILS NFR BLD AUTO: 0.3 %
BUN BLD-MCNC: 91 MG/DL (ref 9–23)
BUN/CREAT SERPL: 24.1 (ref 10–20)
CALCIUM BLD-MCNC: 8.8 MG/DL (ref 8.7–10.4)
CHLORIDE SERPL-SCNC: 119 MMOL/L (ref 98–112)
CO2 SERPL-SCNC: 19 MMOL/L (ref 21–32)
CREAT BLD-MCNC: 3.77 MG/DL
DEPRECATED RDW RBC AUTO: 49 FL (ref 35.1–46.3)
EGFRCR SERPLBLD CKD-EPI 2021: 16 ML/MIN/1.73M2 (ref 60–?)
EOSINOPHIL # BLD AUTO: 0.45 X10(3) UL (ref 0–0.7)
EOSINOPHIL NFR BLD AUTO: 6.4 %
ERYTHROCYTE [DISTWIDTH] IN BLOOD BY AUTOMATED COUNT: 14.6 % (ref 11–15)
GLUCOSE BLD-MCNC: 81 MG/DL (ref 70–99)
GLUCOSE BLDC GLUCOMTR-MCNC: 111 MG/DL (ref 70–99)
GLUCOSE BLDC GLUCOMTR-MCNC: 79 MG/DL (ref 70–99)
GLUCOSE BLDC GLUCOMTR-MCNC: 90 MG/DL (ref 70–99)
GLUCOSE BLDC GLUCOMTR-MCNC: 97 MG/DL (ref 70–99)
HCT VFR BLD AUTO: 28.6 %
HGB BLD-MCNC: 9.7 G/DL
IMM GRANULOCYTES # BLD AUTO: 0.09 X10(3) UL (ref 0–1)
IMM GRANULOCYTES NFR BLD: 1.3 %
LYMPHOCYTES # BLD AUTO: 1.55 X10(3) UL (ref 1–4)
LYMPHOCYTES NFR BLD AUTO: 21.9 %
MCH RBC QN AUTO: 31.1 PG (ref 26–34)
MCHC RBC AUTO-ENTMCNC: 33.9 G/DL (ref 31–37)
MCV RBC AUTO: 91.7 FL
MONOCYTES # BLD AUTO: 0.79 X10(3) UL (ref 0.1–1)
MONOCYTES NFR BLD AUTO: 11.2 %
NEUTROPHILS # BLD AUTO: 4.18 X10 (3) UL (ref 1.5–7.7)
NEUTROPHILS # BLD AUTO: 4.18 X10(3) UL (ref 1.5–7.7)
NEUTROPHILS NFR BLD AUTO: 58.9 %
OSMOLALITY SERPL CALC.SUM OF ELEC: 337 MOSM/KG (ref 275–295)
PHOSPHATE SERPL-MCNC: 3.6 MG/DL (ref 2.4–5.1)
PLATELET # BLD AUTO: 167 10(3)UL (ref 150–450)
POTASSIUM SERPL-SCNC: 3.6 MMOL/L (ref 3.5–5.1)
RBC # BLD AUTO: 3.12 X10(6)UL
SODIUM SERPL-SCNC: 150 MMOL/L (ref 136–145)
WBC # BLD AUTO: 7.1 X10(3) UL (ref 4–11)

## 2024-07-21 NOTE — PROGRESS NOTES
NEPHROLOGY DAILY PROGRESS NOTE     SUBJECTIVE:  Resting comfortably, no new complaints   Son at bedside      OBJECTIVE:    Total Intake/Output:    Intake/Output Summary (Last 24 hours) at 7/21/2024 1247  Last data filed at 7/21/2024 0815  Gross per 24 hour   Intake 360 ml   Output 900 ml   Net -540 ml         PHYSICAL EXAM:  /81 (BP Location: Left arm)   Pulse 73   Temp 97.7 °F (36.5 °C) (Axillary)   Resp 20   Ht 5' 8\" (1.727 m)   Wt 142 lb 3.2 oz (64.5 kg)   SpO2 99%   BMI 21.62 kg/m²   GEN: NAD, on oxygen  HEENT: NCAT     CHEST:  decreased breath sounds   CARDIAC: S1S2 normal  EXT:  no lower ext edema  NEURO: awake, answering questions       CURRENT MEDICATIONS:  Current Facility-Administered Medications   Medication Dose Route Frequency    hydrocortisone 1 % cream   Topical BID    hydrOXYzine (Atarax) tab 25 mg  25 mg Oral TID PRN    glucose (Dex4) 15 GM/59ML oral liquid 15 g  15 g Oral Q15 Min PRN    Or    glucose (Glutose) 40% oral gel 15 g  15 g Oral Q15 Min PRN    Or    glucose-vitamin C (Dex-4) chewable tab 4 tablet  4 tablet Oral Q15 Min PRN    Or    dextrose 50% injection 50 mL  50 mL Intravenous Q15 Min PRN    Or    glucose (Dex4) 15 GM/59ML oral liquid 30 g  30 g Oral Q15 Min PRN    Or    glucose (Glutose) 40% oral gel 30 g  30 g Oral Q15 Min PRN    Or    glucose-vitamin C (Dex-4) chewable tab 8 tablet  8 tablet Oral Q15 Min PRN    acetaminophen (Tylenol Extra Strength) tab 500 mg  500 mg Oral Q4H PRN    ondansetron (Zofran) 4 MG/2ML injection 4 mg  4 mg Intravenous Q6H PRN    metoclopramide (Reglan) 5 mg/mL injection 5 mg  5 mg Intravenous Q8H PRN    insulin aspart (NovoLOG) 100 Units/mL FlexPen 1-5 Units  1-5 Units Subcutaneous TID CC    diphenhydrAMINE (Benadryl) 12.5 MG/5ML oral liquid 25 mg  25 mg Oral QID PRN    cefTRIAXone (Rocephin) 1 g in sodium chloride 0.9% 100 mL IVPB-ADDV  1 g Intravenous Q24H       LABS:  Patient Labs Reviewed in Detail. Pertinent Labs as follows:  Recent Labs    Lab 07/19/24  0518 07/20/24  0500 07/21/24  0454   GLU 94 90 81   BUN 94* 90* 91*   CREATSERUM 3.85* 3.75* 3.77*   EGFRCR 15* 16* 16*   CA 8.3* 8.9 8.8   * 149* 150*   K 3.2* 3.5 3.6   * 121* 119*   CO2 16.0* 17.0* 19.0*     Recent Labs   Lab 07/17/24  1633 07/19/24  0518 07/21/24  0454   RBC 3.10* 2.90* 3.12*   HGB 9.5* 9.1* 9.7*   HCT 29.8* 27.2* 28.6*   MCV 96.1 93.8 91.7   MCH 30.6 31.4 31.1   MCHC 31.9 33.5 33.9   RDW 14.8 14.6 14.6   NEPRELIM 11.61*  --  4.18   WBC 14.3* 7.4 7.1   .0 157.0 167.0         IMAGING:  No results found.     ASSESSMENT AND PLAN:   This is a 78 year old male with PMH sig for dementia, DM2, HTN, CVA and CKD stage 3-4.  Presented with abnormal labs. Nephrology is consulted for KIMBERLEY.      Non-oliguric KIMBERLEY on CKD stage 4   - baseline sCr ~ 2.7-3.1   - sCr 4.61 on admission   - renal US neg for hydronephrosis, atrophied R kidney   - suspect pre-renal KIMBERLEY and was started on IVFs.  Developed fluid overload and IVFs now stopped   - creatinine stable at 3.77   - follow renal fxn and I/Os   - no acute indication for RRT, however agree that patient is not a good dialysis candidate.     Acute hypoxic respiratory failure   Volume overload / pulm edema    - s/p 40 IV lasix 7/19, 7/20 x2   - hold diuretics today given worsening hypernatremia and stable oxygen requirements   - wean oxygen as tolerated      Hyperkalemia   - resolved      Acidosis   - due to KIMBERLEY   - improving      Hypernatremia    - Na worsening to 150  - encourage PO intake of free water  - holding diuretics today, will need D5W if patient does not proceed with hospice.  Family to meet this afternoon.     Anemia   Iron deficiency   - received dose of ferrlecit 7/20   - blood transfusions per primary      Dispo: patient / family to meet with hospice this afternoon     Dw Dr. Jarvis      We will continue to follow Cole Artis MD  [facility]

## 2024-07-21 NOTE — PLAN OF CARE
Problem: Patient Centered Care  Goal: Patient preferences are identified and integrated in the patient's plan of care  Description: Interventions:  - What would you like us to know as we care for you? From [facility]  - Provide timely, complete, and accurate information to patient/family  - Incorporate patient and family knowledge, values, beliefs, and cultural backgrounds into the planning and delivery of care  - Encourage patient/family to participate in care and decision-making at the level they choose  - Honor patient and family perspectives and choices  Outcome: Progressing     Problem: Diabetes/Glucose Control  Goal: Glucose maintained within prescribed range  Description: INTERVENTIONS:  - Monitor Blood Glucose as ordered  - Assess for signs and symptoms of hyperglycemia and hypoglycemia  - Administer ordered medications to maintain glucose within target range  - Assess barriers to adequate nutritional intake and initiate nutrition consult as needed  - Instruct patient on self management of diabetes  Outcome: Progressing     Problem: Patient/Family Goals  Goal: Patient/Family Long Term Goal  Description: Patient's Long Term Goal: To get discharged    Interventions:  - Medications as ordered  - Trend labs  - See additional Care Plan goals for specific interventions  Outcome: Progressing  Goal: Patient/Family Short Term Goal  Description: Patient's Short Term Goal: Skin protection    Interventions:   - Turn in bed  - Protective barrier to prominences  - See additional Care Plan goals for specific interventions  Outcome: Progressing     Problem: SKIN/TISSUE INTEGRITY - ADULT  Goal: Skin integrity remains intact  Description: INTERVENTIONS  - Assess and document risk factors for pressure ulcer development  - Assess and document skin integrity  - Monitor for areas of redness and/or skin breakdown  - Initiate interventions, skin care algorithm/standards of care as needed  Outcome: Progressing      Problem: NEUROLOGICAL - ADULT  Goal: Achieves stable or improved neurological status  Description: INTERVENTIONS  - Assess for and report changes in neurological status  - Initiate measures to prevent increased intracranial pressure  - Maintain blood pressure and fluid volume within ordered parameters to optimize cerebral perfusion and minimize risk of hemorrhage  - Monitor temperature, glucose, and sodium. Initiate appropriate interventions as ordered  Outcome: Progressing     Problem: Impaired Swallowing  Goal: Minimize aspiration risk  Description: Interventions:  - Patient should be alert and upright for all feedings (90 degrees preferred)  - Offer food and liquids at a slow rate  - No straws  - Encourage small bites of food and small sips of liquid  - Offer pills one at a time, crush or deliver with applesauce as needed  - Discontinue feeding and notify MD (or speech pathologist) if coughing or persistent throat clearing or wet/gurgly vocal quality is noted  Outcome: Progressing   No acute changes in patient status , vital signs stable , fall precautions in place , frequent rounding by nursing staff, hospice to meet with family

## 2024-07-21 NOTE — PROGRESS NOTES
[facility] Hospitalist Progress Note     CC: Hospital Follow up    PCP: Irene Cedillo MD       Assessment/Plan:     Principal Problem:    KIMBERLEY (acute kidney injury) (HCC)  Active Problems:    Dehydration    Acute cystitis without hematuria    Leukocytosis, unspecified type    Palliative care by specialist      Mr. Eric is a 77 yo M with PMH of dementia, DM2, CKD3, HTN, CVA who presented with abnormal labs, found to have KIMBERLEY on CKD.  Worsening hypoxia noted due to fluid overload.  Patient with progression of chronic kidney disease.  Monitor response to Lasix but suspect may worsen renal function.  Patient is a poor dialysis candidate.  Will continue to discuss goals of care with family.  Family to come today around 2 PM.  Discussed with son at bedside and likely to pick hospice at his memory care facility if possible.  Family cannot provide home care due to baseline dementia issues.     KIMBERLEY on CKD4 vs. Progression of CKD  Hyperkalemia  - K 6.7 on admit, repeat K 4.7 without intervention  - Cr 4.61 on admit, , prior Cr 3.0/BUN 50  - renal dose meds  - hold nephrotoxic mediations  - renal consult, appreciate recs  - renal US without obstruction  - initially on IVF, now stopped due to fluid overload    Hypoxia  -worsened early 7/20 but improved s/p lasix, down to 3L  - CXR with fluid overload  - IV lasix x1 over night, further dosing per renal   - wean O2 as able     Anemia  - hg 9.5 on admit, prior Hg 13 a year ago, has been trending down per SNF labs  - check iron studies  -  monitor for GIB, no signs/symptoms of bleeding  - hold ASA/Eliquis  -Now stable in the nines  - venofer added    UTI  - started on ceftraixone, continue  - f/u Ucx, proteus, R to FQ, macrobid      Leukocytosis, resolved on 7/19  - 2/2 above  - continue abx     DM2 with hypoglycemia  - home regimen: dapagliflozin- hold  - accuchecks QID, hypoglycemic protocol, SSI  - add D5 to IVF     HTN  - BP low  - hold home meds     CVA  - ASA- hold      Hx DVT  - hold Eliquis with new anemia     Dysphagia  - has been on modified diet with thickened liquids      Vascular Dementia  - lives at [facility]     ACP  - CODE-DNR/select  - POA- wife- updated via phone  - long term resident at [facility]  - palliative care consult, continued [facility] discussions     FN:  - IVF: stop IVF  - Diet: pureed diet, thickened liquids     DVT Prophy: SCD  Lines: PIV     Dispo: pending clinical course        Further recommendations pending patient's clinical course.  [facility] hospitalist to continue to follow patient while in house     Patient and/or patient's family given opportunity to ask questions and note understanding and agreeing with therapeutic plan as outlined    Note: This chart was prepared using voice recognition software and may contain unintended word substitution errors.     Dw wife over the phone to meet with her at bedside on 7/21/2024.  Did discuss with son at bedside today.  They are aware that at some point he will progress to end-stage renal disease and is not a dialysis candidate.  They realize that in the interim he would likely require frequent hospitalizations to try and slow down that progression.  Given they would not pursue dialysis he would prefer that he not need to go back and forth to the hospital and just be kept comfortable at his memory care facility.  Plan to meet hospice today.    Dominique Jarvis MD  Hospitalist  [facility]   Answering service: [phone]         Subjective:     Hypoxia improved, pt with zero complaints     OBJECTIVE:    Blood pressure 112/81, pulse 73, temperature 97.7 °F (36.5 °C), temperature source Axillary, resp. rate 20, height 5' 8\" (1.727 m), weight 142 lb 3.2 oz (64.5 kg), SpO2 99%.    Temp:  [97.6 °F (36.4 °C)-98 °F (36.7 °C)] 97.7 °F (36.5 °C)  Pulse:  [73-88] 73  Resp:  [18-20] 20  BP: (112-123)/(69-95) 112/81  SpO2:  [89 %-99 %] 99 %      Intake/Output:    Intake/Output Summary (Last 24 hours) at 7/21/2024  1238  Last data filed at 7/21/2024 0815  Gross per 24 hour   Intake 360 ml   Output 900 ml   Net -540 ml       Last 3 Weights   07/18/24 0019 142 lb 3.2 oz (64.5 kg)   07/18/24 0009 142 lb 1.6 oz (64.5 kg)   07/17/24 1545 160 lb (72.6 kg)   12/04/23 1312 169 lb 12.1 oz (77 kg)   09/24/23 1750 170 lb (77.1 kg)       Exam   GEN: elderly male in NAD, alert, cooperative, confused   HEENT: EOMI  Pulm: decreased breath sounds at bases, no increased work of breathing  CV: RRR, no murmurs  ABD: Soft, non-tender, non-distended, +BS  SKIN: warm, dry  EXT: no edema      Data Review:       Labs:     Recent Labs   Lab 07/17/24  1633 07/19/24  0518 07/21/24  0454   RBC 3.10* 2.90* 3.12*   HGB 9.5* 9.1* 9.7*   HCT 29.8* 27.2* 28.6*   MCV 96.1 93.8 91.7   MCH 30.6 31.4 31.1   MCHC 31.9 33.5 33.9   RDW 14.8 14.6 14.6   NEPRELIM 11.61*  --  4.18   WBC 14.3* 7.4 7.1   .0 157.0 167.0         Recent Labs   Lab 07/19/24  0518 07/20/24  0500 07/21/24  0454   GLU 94 90 81   BUN 94* 90* 91*   CREATSERUM 3.85* 3.75* 3.77*   EGFRCR 15* 16* 16*   CA 8.3* 8.9 8.8   * 149* 150*   K 3.2* 3.5 3.6   * 121* 119*   CO2 16.0* 17.0* 19.0*       Recent Labs   Lab 07/17/24  1633 07/21/24  0454   ALT 24  --    AST 19  --    ALB 3.7 3.7         Imaging:  XR CHEST AP PORTABLE  (CPT=71045)    Result Date: 7/19/2024  CONCLUSION:  Development of findings that are most consistent with moderate CHF/fluid overload including pulmonary edema.    Dictated by (CST): Humberto Macdonald MD on 7/19/2024 at 9:32 AM     Finalized by (CST): Humberto Macdonald MD on 7/19/2024 at 9:34 AM             Meds:     INPATIENT MEDICATIONS    Scheduled Medications:      hydrocortisone, , BID  insulin aspart, 1-5 Units, TID CC  cefTRIAXone, 1 g, Q24H            Drips:         PRN Medications  hydrOXYzine, 25 mg, TID PRN  glucose, 15 g, Q15 Min PRN   Or  glucose, 15 g, Q15 Min PRN   Or  glucose-vitamin C, 4 tablet, Q15 Min PRN   Or  dextrose, 50 mL, Q15 Min PRN    Or  glucose, 30 g, Q15 Min PRN   Or  glucose, 30 g, Q15 Min PRN   Or  glucose-vitamin C, 8 tablet, Q15 Min PRN  acetaminophen, 500 mg, Q4H PRN  ondansetron, 4 mg, Q6H PRN  metoclopramide, 5 mg, Q8H PRN  diphenhydrAMINE, 25 mg, QID PRN

## 2024-07-21 NOTE — HOSPICE RN NOTE
[facility] informational meeting set with spouse for 2:30 pm today.  Zee Ernandez RN  [facility] TNL/SOC RN  [phone]     [facility] if [facility] would be [facility] on hosp[facility].  Spoke with Erna who related pt would be allowed back.  Discussed DME- cassie, 1/2 rail, fall pad x 2 and O2 requested before discharge.  Facility requesting later admission.  Writer informed Erna that writer would try for a 5 pm admit but if not possible would seek 1 pm.  Update to be forthcoming following informational meeting with spouse.    Zee Ernandez RN  [facility] TNL/SOC RN  [phone]    Met with family to discuss hospice philosophy, services, levels and goals of care.  Family would like to complete course of iv abx and return to [facility] on hospice after speaking with MD. Ashley RN updated.  [facility] will follow up with family Monday.  Family to call if services needed sooner.    Zee Ernandez RN  [facility] TNL/SOC RN  [phone]

## 2024-07-21 NOTE — PLAN OF CARE
Problem: Patient Centered Care  Goal: Patient preferences are identified and integrated in the patient's plan of care  Description: Interventions:  - What would you like us to know as we care for you? From [facility]  - Provide timely, complete, and accurate information to patient/family  - Incorporate patient and family knowledge, values, beliefs, and cultural backgrounds into the planning and delivery of care  - Encourage patient/family to participate in care and decision-making at the level they choose  - Honor patient and family perspectives and choices  Outcome: Progressing     Problem: Diabetes/Glucose Control  Goal: Glucose maintained within prescribed range  Description: INTERVENTIONS:  - Monitor Blood Glucose as ordered  - Assess for signs and symptoms of hyperglycemia and hypoglycemia  - Administer ordered medications to maintain glucose within target range  - Assess barriers to adequate nutritional intake and initiate nutrition consult as needed  - Instruct patient on self management of diabetes  Outcome: Progressing     Problem: SKIN/TISSUE INTEGRITY - ADULT  Goal: Skin integrity remains intact  Description: INTERVENTIONS  - Assess and document risk factors for pressure ulcer development  - Assess and document skin integrity  - Monitor for areas of redness and/or skin breakdown  - Initiate interventions, skin care algorithm/standards of care as needed  Outcome: Progressing     Problem: NEUROLOGICAL - ADULT  Goal: Achieves stable or improved neurological status  Description: INTERVENTIONS  - Assess for and report changes in neurological status  - Initiate measures to prevent increased intracranial pressure  - Maintain blood pressure and fluid volume within ordered parameters to optimize cerebral perfusion and minimize risk of hemorrhage  - Monitor temperature, glucose, and sodium. Initiate appropriate interventions as ordered  Outcome: Progressing     Problem: Impaired Swallowing  Goal:  Minimize aspiration risk  Description: Interventions:  - Patient should be alert and upright for all feedings (90 degrees preferred)  - Offer food and liquids at a slow rate  - No straws  - Encourage small bites of food and small sips of liquid  - Offer pills one at a time, crush or deliver with applesauce as needed  - Discontinue feeding and notify MD (or speech pathologist) if coughing or persistent throat clearing or wet/gurgly vocal quality is noted  Outcome: Progressing     Patient A/Ox1-2, resting in bed, q2 turns, skin care. Pain manage per protocol. Continue IV antibx. Oxygen therapy as needed. Hospice meeting at 2:30pm. Aspiration precaution. Call light within reach.

## 2024-07-22 LAB
ALBUMIN SERPL-MCNC: 3.8 G/DL (ref 3.2–4.8)
ANION GAP SERPL CALC-SCNC: 14 MMOL/L (ref 0–18)
BASOPHILS # BLD AUTO: 0.04 X10(3) UL (ref 0–0.2)
BASOPHILS NFR BLD AUTO: 0.6 %
BUN BLD-MCNC: 89 MG/DL (ref 9–23)
BUN/CREAT SERPL: 23.9 (ref 10–20)
CALCIUM BLD-MCNC: 8.8 MG/DL (ref 8.7–10.4)
CHLORIDE SERPL-SCNC: 123 MMOL/L (ref 98–112)
CO2 SERPL-SCNC: 17 MMOL/L (ref 21–32)
CREAT BLD-MCNC: 3.72 MG/DL
DEPRECATED RDW RBC AUTO: 49.4 FL (ref 35.1–46.3)
EGFRCR SERPLBLD CKD-EPI 2021: 16 ML/MIN/1.73M2 (ref 60–?)
EOSINOPHIL # BLD AUTO: 0.86 X10(3) UL (ref 0–0.7)
EOSINOPHIL NFR BLD AUTO: 12.5 %
ERYTHROCYTE [DISTWIDTH] IN BLOOD BY AUTOMATED COUNT: 14.6 % (ref 11–15)
GLUCOSE BLD-MCNC: 80 MG/DL (ref 70–99)
GLUCOSE BLDC GLUCOMTR-MCNC: 100 MG/DL (ref 70–99)
GLUCOSE BLDC GLUCOMTR-MCNC: 116 MG/DL (ref 70–99)
GLUCOSE BLDC GLUCOMTR-MCNC: 78 MG/DL (ref 70–99)
GLUCOSE BLDC GLUCOMTR-MCNC: 86 MG/DL (ref 70–99)
HCT VFR BLD AUTO: 28 %
HGB BLD-MCNC: 9.6 G/DL
IMM GRANULOCYTES # BLD AUTO: 0.09 X10(3) UL (ref 0–1)
IMM GRANULOCYTES NFR BLD: 1.3 %
LYMPHOCYTES # BLD AUTO: 1.97 X10(3) UL (ref 1–4)
LYMPHOCYTES NFR BLD AUTO: 28.6 %
MCH RBC QN AUTO: 31.5 PG (ref 26–34)
MCHC RBC AUTO-ENTMCNC: 34.3 G/DL (ref 31–37)
MCV RBC AUTO: 91.8 FL
MONOCYTES # BLD AUTO: 0.67 X10(3) UL (ref 0.1–1)
MONOCYTES NFR BLD AUTO: 9.7 %
NEUTROPHILS # BLD AUTO: 3.26 X10 (3) UL (ref 1.5–7.7)
NEUTROPHILS # BLD AUTO: 3.26 X10(3) UL (ref 1.5–7.7)
NEUTROPHILS NFR BLD AUTO: 47.3 %
OSMOLALITY SERPL CALC.SUM OF ELEC: 344 MOSM/KG (ref 275–295)
PHOSPHATE SERPL-MCNC: 3.4 MG/DL (ref 2.4–5.1)
PLATELET # BLD AUTO: 174 10(3)UL (ref 150–450)
PLATELET MORPHOLOGY: NORMAL
POTASSIUM SERPL-SCNC: 3.5 MMOL/L (ref 3.5–5.1)
RBC # BLD AUTO: 3.05 X10(6)UL
SODIUM SERPL-SCNC: 154 MMOL/L (ref 136–145)
WBC # BLD AUTO: 6.9 X10(3) UL (ref 4–11)

## 2024-07-22 RX ORDER — DEXTROSE MONOHYDRATE 50 MG/ML
INJECTION, SOLUTION INTRAVENOUS CONTINUOUS
Status: DISCONTINUED | OUTPATIENT
Start: 2024-07-22 | End: 2024-07-23

## 2024-07-22 RX ORDER — BENZOCAINE/MENTHOL 6 MG-10 MG
1 LOZENGE MUCOUS MEMBRANE 2 TIMES DAILY
Qty: 1 EACH | Refills: 0 | Status: SHIPPED | OUTPATIENT
Start: 2024-07-22 | End: 2024-07-29

## 2024-07-22 RX ORDER — HYDROXYZINE HYDROCHLORIDE 25 MG/1
25 TABLET, FILM COATED ORAL 3 TIMES DAILY PRN
Qty: 30 TABLET | Refills: 0 | Status: SHIPPED | OUTPATIENT
Start: 2024-07-22

## 2024-07-22 RX ORDER — CEFDINIR 300 MG/1
300 CAPSULE ORAL DAILY
Qty: 2 CAPSULE | Refills: 0 | Status: SHIPPED | OUTPATIENT
Start: 2024-07-22

## 2024-07-22 NOTE — CM/SW NOTE
LSW follow up with pt's spouse. Spouse stated she would like to proceed with hospice for pt. Pt's spouse signed hospice consents electronically. Plan to discharge pt tomorrow morning back to [facility]. LSW scheduled [facility]  for 0915. LSW will continue to remain available until pt discharge.      Hortencia Amado, PATRICK  [facility]  [phone]

## 2024-07-22 NOTE — PROGRESS NOTES
[facility] Hospitalist Progress Note     CC: Hospital Follow up    PCP: Irene Cedillo MD       Assessment/Plan:     Principal Problem:    KIMBERLEY (acute kidney injury) (HCC)  Active Problems:    Dehydration    Acute cystitis without hematuria    Leukocytosis, unspecified type    Palliative care by specialist      Mr. Eric is a 77 yo M with PMH of dementia, DM2, CKD3, HTN, CVA who presented with abnormal labs, found to have KIMBERLEY on CKD.  Worsening hypoxia noted due to fluid overload.  Patient with progression of chronic kidney disease.  Monitor response to Lasix but suspect may worsen renal function.  Patient is a poor dialysis candidate.  Will continue to discuss goals of care with family.  Family to come today around 2 PM.  Discussed with son at bedside and likely to pick hospice at his memory care facility if possible.  Family cannot provide home care due to baseline dementia issues.  Decision made to pursue hospice.  Will enroll tomorrow on 7/23/2024 at [facility] as he is a permanent resident of the memory care unit.  Family in agreement with plan.  Nephrology updated.     KIMBERLEY on CKD4 vs. Progression of CKD  Hyperkalemia  - K 6.7 on admit, repeat K 4.7 without intervention  - Cr 4.61 on admit, , prior Cr 3.0/BUN 50  - renal dose meds  - hold nephrotoxic mediations  - renal consult, appreciate recs  - renal US without obstruction  - initially on IVF, now stopped due to fluid overload and then resumed due to sodium.  Gentle fluids for now patient appears to be tolerating  -Creatinine did improve into the threes, will not check further labs due to not patient will be hospice.    Hypoxia  -worsened early 7/20 but improved s/p lasix, down to 3L, O2 as needed  - CXR with fluid overload  - wean O2 as able     Anemia  - hg 9.5 on admit, prior Hg 13 a year ago, has been trending down per SNF labs  - check iron studies  -  monitor for GIB, no signs/symptoms of bleeding  - hold ASA/Eliquis-> only resume aspirin at  discharge, benefit of Eliquis outweighed by risk.  -Now stable in the nines  - venofer added    UTI  - started on ceftraixone, continue-> can DC on oral to complete course.  Plan to transition at discharge.  - f/u Ucx, proteus, R to FQ, macrobid      Leukocytosis, resolved on 7/19  - 2/2 above  - continue abx     DM2 with hypoglycemia  - home regimen: dapagliflozin- hold at discharge  - accuchecks QID, hypoglycemic protocol, SSI  - add D5 to IVF     HTN  - BP low  - hold home meds     CVA  - ASA- hold     Hx DVT  - hold Eliquis with new anemia     Dysphagia  - has been on modified diet with thickened liquids      Vascular Dementia  - lives at [facility]     ACP  - CODE-DNR/select  - POA- wife- updated via phone  - long term resident at [facility]  - palliative care consult, continued GOC discussions     FN:  - IVF: stop IVF  - Diet: pureed diet, thickened liquids     DVT Prophy: SCD  Lines: PIV     Dispo: pending clinical course        Further recommendations pending patient's clinical course.  [facility] hospitalist to continue to follow patient while in house     Patient and/or patient's family given opportunity to ask questions and note understanding and agreeing with therapeutic plan as outlined    Note: This chart was prepared using voice recognition software and may contain unintended word substitution errors.     Family met with hospice.  Plan is to enroll in outpatient hospice on 723 at [facility].  They would like him to complete the course of antibiotics which can be transition to orals upon discharge.    Dominique Jarvis MD  Hospitalist  [facility]   Answering service: [phone]         Subjective:     Hypoxia improved, patient states he has no complaints.  Denies pain.  OBJECTIVE:    Blood pressure 158/78, pulse 88, temperature 97.3 °F (36.3 °C), temperature source Axillary, resp. rate 20, height 5' 8\" (1.727 m), weight 142 lb 3.2 oz (64.5 kg), SpO2 97%.    Temp:  [97.3 °F (36.3 °C)-97.7 °F  (36.5 °C)] 97.3 °F (36.3 °C)  Pulse:  [81-88] 88  Resp:  [18-20] 20  BP: (127-164)/(63-88) 158/78  SpO2:  [93 %-99 %] 97 %      Intake/Output:    Intake/Output Summary (Last 24 hours) at 7/22/2024 1519  Last data filed at 7/22/2024 0842  Gross per 24 hour   Intake 480 ml   Output 900 ml   Net -420 ml       Last 3 Weights   07/18/24 0019 142 lb 3.2 oz (64.5 kg)   07/18/24 0009 142 lb 1.6 oz (64.5 kg)   07/17/24 1545 160 lb (72.6 kg)   12/04/23 1312 169 lb 12.1 oz (77 kg)   09/24/23 1750 170 lb (77.1 kg)       Exam   GEN: elderly male in NAD, alert, cooperative, confused   HEENT: EOMI  Pulm: decreased breath sounds at bases, no increased work of breathing  CV: RRR, no murmurs  ABD: Soft, non-tender, non-distended, +BS  SKIN: warm, dry  EXT: no edema      Data Review:       Labs:     Recent Labs   Lab 07/17/24  1633 07/19/24  0518 07/21/24  0454 07/22/24  0544   RBC 3.10* 2.90* 3.12* 3.05*   HGB 9.5* 9.1* 9.7* 9.6*   HCT 29.8* 27.2* 28.6* 28.0*   MCV 96.1 93.8 91.7 91.8   MCH 30.6 31.4 31.1 31.5   MCHC 31.9 33.5 33.9 34.3   RDW 14.8 14.6 14.6 14.6   NEPRELIM 11.61*  --  4.18 3.26   WBC 14.3* 7.4 7.1 6.9   .0 157.0 167.0 174.0         Recent Labs   Lab 07/20/24  0500 07/21/24  0454 07/22/24  0544   GLU 90 81 80   BUN 90* 91* 89*   CREATSERUM 3.75* 3.77* 3.72*   EGFRCR 16* 16* 16*   CA 8.9 8.8 8.8   * 150* 154*   K 3.5 3.6 3.5   * 119* 123*   CO2 17.0* 19.0* 17.0*       Recent Labs   Lab 07/17/24  1633 07/21/24  0454 07/22/24  0544   ALT 24  --   --    AST 19  --   --    ALB 3.7 3.7 3.8         Imaging:  No results found.      Meds:     INPATIENT MEDICATIONS    Scheduled Medications:      hydrocortisone, , BID  insulin aspart, 1-5 Units, TID CC  cefTRIAXone, 1 g, Q24H            Drips:  dextrose, Last Rate: 40 mL/hr at 07/22/24 1207          PRN Medications  hydrOXYzine, 25 mg, TID PRN  glucose, 15 g, Q15 Min PRN   Or  glucose, 15 g, Q15 Min PRN   Or  glucose-vitamin C, 4 tablet, Q15 Min PRN    Or  dextrose, 50 mL, Q15 Min PRN   Or  glucose, 30 g, Q15 Min PRN   Or  glucose, 30 g, Q15 Min PRN   Or  glucose-vitamin C, 8 tablet, Q15 Min PRN  acetaminophen, 500 mg, Q4H PRN  ondansetron, 4 mg, Q6H PRN  metoclopramide, 5 mg, Q8H PRN  diphenhydrAMINE, 25 mg, QID PRN

## 2024-07-22 NOTE — CM/SW NOTE
Pt discussed in RN DC Rounds. SW was informed that the tentative plan is for patient to finish IV ABX here at the hospital, then transition with [facility] at [facility].     244pm- SW was informed that spouse signed consents electronically and patients will be discharge to [facility] with [facility] tomorrow at 9am. Sw asked [facility] here at [facility] To notify BTL Liaison.     Plan: Pending medical clearance, DC to [facility] with [facility]    SW/CM to remain available for support and/or discharge planning.     Anahi Sutton, MSW, LSW   x [phone]

## 2024-07-22 NOTE — PROGRESS NOTES
NEPHROLOGY DAILY PROGRESS NOTE     SUBJECTIVE:    Resting comfortably, no new complaints     OBJECTIVE:    Total Intake/Output:    Intake/Output Summary (Last 24 hours) at 7/22/2024 1023  Last data filed at 7/22/2024 0842  Gross per 24 hour   Intake 720 ml   Output 900 ml   Net -180 ml         PHYSICAL EXAM:  /78   Pulse 88   Temp 97.3 °F (36.3 °C) (Axillary)   Resp 20   Ht 5' 8\" (1.727 m)   Wt 142 lb 3.2 oz (64.5 kg)   SpO2 97%   BMI 21.62 kg/m²   GEN: NAD, on oxygen  HEENT: NCAT     CHEST:  decreased breath sounds   CARDIAC: S1S2 normal  EXT:  no lower ext edema  NEURO: awake, answering questions       CURRENT MEDICATIONS:  Current Facility-Administered Medications   Medication Dose Route Frequency    hydrocortisone 1 % cream   Topical BID    hydrOXYzine (Atarax) tab 25 mg  25 mg Oral TID PRN    glucose (Dex4) 15 GM/59ML oral liquid 15 g  15 g Oral Q15 Min PRN    Or    glucose (Glutose) 40% oral gel 15 g  15 g Oral Q15 Min PRN    Or    glucose-vitamin C (Dex-4) chewable tab 4 tablet  4 tablet Oral Q15 Min PRN    Or    dextrose 50% injection 50 mL  50 mL Intravenous Q15 Min PRN    Or    glucose (Dex4) 15 GM/59ML oral liquid 30 g  30 g Oral Q15 Min PRN    Or    glucose (Glutose) 40% oral gel 30 g  30 g Oral Q15 Min PRN    Or    glucose-vitamin C (Dex-4) chewable tab 8 tablet  8 tablet Oral Q15 Min PRN    acetaminophen (Tylenol Extra Strength) tab 500 mg  500 mg Oral Q4H PRN    ondansetron (Zofran) 4 MG/2ML injection 4 mg  4 mg Intravenous Q6H PRN    metoclopramide (Reglan) 5 mg/mL injection 5 mg  5 mg Intravenous Q8H PRN    insulin aspart (NovoLOG) 100 Units/mL FlexPen 1-5 Units  1-5 Units Subcutaneous TID CC    diphenhydrAMINE (Benadryl) 12.5 MG/5ML oral liquid 25 mg  25 mg Oral QID PRN    cefTRIAXone (Rocephin) 1 g in sodium chloride 0.9% 100 mL IVPB-ADDV  1 g Intravenous Q24H       LABS:  Patient Labs Reviewed in Detail. Pertinent Labs as follows:  Recent Labs   Lab 07/20/24  0500 07/21/24  4450  07/22/24  0544   GLU 90 81 80   BUN 90* 91* 89*   CREATSERUM 3.75* 3.77* 3.72*   EGFRCR 16* 16* 16*   CA 8.9 8.8 8.8   * 150* 154*   K 3.5 3.6 3.5   * 119* 123*   CO2 17.0* 19.0* 17.0*     Recent Labs   Lab 07/17/24  1633 07/19/24  0518 07/21/24  0454 07/22/24  0544   RBC 3.10* 2.90* 3.12* 3.05*   HGB 9.5* 9.1* 9.7* 9.6*   HCT 29.8* 27.2* 28.6* 28.0*   MCV 96.1 93.8 91.7 91.8   MCH 30.6 31.4 31.1 31.5   MCHC 31.9 33.5 33.9 34.3   RDW 14.8 14.6 14.6 14.6   NEPRELIM 11.61*  --  4.18 3.26   WBC 14.3* 7.4 7.1 6.9   .0 157.0 167.0 174.0         IMAGING:  No results found.     ASSESSMENT AND PLAN:   This is a 78 year old male with PMH sig for dementia, DM2, HTN, CVA and CKD stage 3-4.  Presented with abnormal labs. Nephrology is consulted for KIMBERLEY.      Non-oliguric KIMBERLEY on CKD stage 4   - baseline sCr ~ 2.7-3.1   - sCr 4.61 on admission   - renal US neg for hydronephrosis, atrophied R kidney   - suspect pre-renal KIMBERLEY and was started on IVFs.  Developed fluid overload  - creatinine stable at 3.7  - follow renal fxn and I/Os   - no acute indication for RRT, however patient is not a good dialysis candidate.     Acute hypoxic respiratory failure   Volume overload / pulm edema    - s/p 40 IV lasix 7/19, 7/20 x2   - hold diuretics today given worsening hypernatremia and stable oxygen requirements   - wean oxygen as tolerated      Hyperkalemia   - resolved      Acidosis   - due to KIMBERLEY   - improving      Hypernatremia    - Na worsening to 154  - encourage PO intake of free water  - start on D5W@40cc/hr    Anemia   Iron deficiency   - received dose of ferrlecit 7/20   - blood transfusions per primary      We will continue to follow Cole Guerrier MD  [facility]  Nephrology

## 2024-07-22 NOTE — SLP NOTE
SPEECH DAILY NOTE - INPATIENT    ASSESSMENT & PLAN   ASSESSMENT  PPE REQUIRED. THIS THERAPIST WORE GLOVES, DROPLET MASK, AND GOGGLES FOR DURATION OF EVALUATION. HANDS WASHED UPON ENTRANCE/EXIT.    SLP f/u for ongoing dysphagia tx/meal assessment per recommendations of puree/mildly thick liquids per BSE. RN reports pt tolerates diet and medication well with no overt clinical s/s aspiration. Pt denies any swallowing challenges.     Pt positioned upright in bed. Pt afebrile, tolerating room air with oxygen status 95 prior to the start of oral trials. SLP reviewed aspiration precautions and safe swallowing compensatory strategies with the patient. Safe swallow guidelines remain written on the white board in purple. Diet recommendations remain on the whiteboard in the room. Patient's RN v/u. Provided 1:1 feed assistance, pt tolerates puree and mildly thick liquids via open cup with no overt clinical signs/symptoms of aspiration. Pt trialed with soft solid and unable to initiate bite response. Trials discontinued. Oral/buccal cavities clear of residue following all trials. Oxygen status remained >94 t/o the entire session.    PLAN: SLP to sign off at this time secondary to pt tolerating least restrictive diet with no CSA. Pt to discharge to [facility] with hospice.     Diet Recommendations - Solids: Puree  Diet Recommendations - Liquids: Nectar thick liquids/ Mildly thick    Compensatory Strategies Recommended: No straws;Slow rate;Alternate consistencies;Small bites and sips  Aspiration Precautions: Upright position;Slow rate;Small bites and sips;No straw  Medication Administration Recommendations: Crushed in puree    Patient Experiencing Pain: No      Treatment Plan  Treatment Plan/Recommendations: Aspiration precautions    Interdisciplinary Communication: Discussed with RN  Recommendations posted at bedside            GOALS  Goal #1 The patient will tolerate puree consistency and mildly thick liquids without overt  signs or symptoms of aspiration with 100 % accuracy over 1-2 session(s).  Goal met   Goal #2 The patient/family/caregiver will demonstrate understanding and implementation of aspiration precautions and swallow strategies independently over 1-2 session(s).    Goal met     FOLLOW UP  Follow Up Needed (Documentation Required): No  SLP Follow-up Date:  (n/a)  Number of Visits to Meet Established Goals:  (1-2)    Session: 1 following BSSE    If you have any questions, please contact SPIKE Rush M.S. CCC-SLP  Speech Language Pathologist  Phone Number [phone]. [phone]

## 2024-07-22 NOTE — PLAN OF CARE
Patient is alert and oriented per baseline, vital signs stable, no complaints of pain. Stable on room air. Continues on IV antibiotics, no adverse reactions noted. Assisted with care needs by staff. Family updated on plan of care. Fall precautions in place, call light visible and within reach.     Problem: Patient Centered Care  Goal: Patient preferences are identified and integrated in the patient's plan of care  Description: Interventions:  - What would you like us to [facility] as we care [facility] you? [facility]  - Provide timely, complete, and accurate information to patient/family  - Incorporate patient and family knowledge, values, beliefs, and cultural backgrounds into the planning and delivery of care  - Encourage patient/family to participate in care and decision-making at the level they choose  - Honor patient and family perspectives and choices  Outcome: Progressing     Problem: Diabetes/Glucose Control  Goal: Glucose maintained within prescribed range  Description: INTERVENTIONS:  - Monitor Blood Glucose as ordered  - Assess for signs and symptoms of hyperglycemia and hypoglycemia  - Administer ordered medications to maintain glucose within target range  - Assess barriers to adequate nutritional intake and initiate nutrition consult as needed  - Instruct patient on self management of diabetes  Outcome: Progressing     Problem: Patient/Family Goals  Goal: Patient/Family Long Term Goal  Description: Patient's Long Term Goal: To get discharged    Interventions:  - Medications as ordered  - Trend labs  - See additional Care Plan goals for specific interventions  Outcome: Progressing  Goal: Patient/Family Short Term Goal  Description: Patient's Short Term Goal: Skin protection    Interventions:   - Turn in bed  - Protective barrier to prominences  - See additional Care Plan goals for specific interventions  Outcome: Progressing     Problem: SKIN/TISSUE INTEGRITY - ADULT  Goal: Skin integrity remains  intact  Description: INTERVENTIONS  - Assess and document risk factors for pressure ulcer development  - Assess and document skin integrity  - Monitor for areas of redness and/or skin breakdown  - Initiate interventions, skin care algorithm/standards of care as needed  Outcome: Progressing     Problem: NEUROLOGICAL - ADULT  Goal: Achieves stable or improved neurological status  Description: INTERVENTIONS  - Assess for and report changes in neurological status  - Initiate measures to prevent increased intracranial pressure  - Maintain blood pressure and fluid volume within ordered parameters to optimize cerebral perfusion and minimize risk of hemorrhage  - Monitor temperature, glucose, and sodium. Initiate appropriate interventions as ordered  Outcome: Progressing     Problem: Impaired Swallowing  Goal: Minimize aspiration risk  Description: Interventions:  - Patient should be alert and upright for all feedings (90 degrees preferred)  - Offer food and liquids at a slow rate  - No straws  - Encourage small bites of food and small sips of liquid  - Offer pills one at a time, crush or deliver with applesauce as needed  - Discontinue feeding and notify MD (or speech pathologist) if coughing or persistent throat clearing or wet/gurgly vocal quality is noted  Outcome: Progressing

## 2024-07-23 VITALS
TEMPERATURE: 98 F | RESPIRATION RATE: 18 BRPM | OXYGEN SATURATION: 100 % | DIASTOLIC BLOOD PRESSURE: 96 MMHG | HEART RATE: 82 BPM | BODY MASS INDEX: 21.55 KG/M2 | SYSTOLIC BLOOD PRESSURE: 131 MMHG | HEIGHT: 68 IN | WEIGHT: 142.19 LBS

## 2024-07-23 LAB — GLUCOSE BLDC GLUCOMTR-MCNC: 95 MG/DL (ref 70–99)

## 2024-07-23 NOTE — PLAN OF CARE
Cole is restless AOX1, unable to comprehend.  Breathing on room air, assisted with ADLs,  refused to eat . Safety measures applied and reviewed, Non skid socks in use.bed exit alarm is in use,video monitoring done. call light within reach. Bed is in lowest position. Being discharge to rehab with hospice. Report given to nurse at rehab all discharge info and paper work will be sent with pt. VACC plan was placced last night by nurse for agitation and physical harm to self or staff members, per Dr. Jarvis no need to hold the discharge.     Problem: Patient Centered Care  Goal: Patient preferences are identified and integrated in the patient's plan of care  Description: Interventions:  - What would you like us to know as we care for you? From [facility]  - Provide timely, complete, and accurate information to patient/family  - Incorporate patient and family knowledge, values, beliefs, and cultural backgrounds into the planning and delivery of care  - Encourage patient/family to participate in care and decision-making at the level they choose  - Honor patient and family perspectives and choices  Outcome: Adequate for Discharge     Problem: Diabetes/Glucose Control  Goal: Glucose maintained within prescribed range  Description: INTERVENTIONS:  - Monitor Blood Glucose as ordered  - Assess for signs and symptoms of hyperglycemia and hypoglycemia  - Administer ordered medications to maintain glucose within target range  - Assess barriers to adequate nutritional intake and initiate nutrition consult as needed  - Instruct patient on self management of diabetes  Outcome: Adequate for Discharge     Problem: Patient/Family Goals  Goal: Patient/Family Long Term Goal  Description: Patient's Long Term Goal: To get discharged    Interventions:  - Medications as ordered  - Trend labs  - See additional Care Plan goals for specific interventions  Outcome: Adequate for Discharge  Goal: Patient/Family Short Term  Goal  Description: Patient's Short Term Goal: Skin protection    Interventions:   - Turn in bed  - Protective barrier to prominences  - See additional Care Plan goals for specific interventions  Outcome: Adequate for Discharge     Problem: SKIN/TISSUE INTEGRITY - ADULT  Goal: Skin integrity remains intact  Description: INTERVENTIONS  - Assess and document risk factors for pressure ulcer development  - Assess and document skin integrity  - Monitor for areas of redness and/or skin breakdown  - Initiate interventions, skin care algorithm/standards of care as needed  Outcome: Adequate for Discharge     Problem: NEUROLOGICAL - ADULT  Goal: Achieves stable or improved neurological status  Description: INTERVENTIONS  - Assess for and report changes in neurological status  - Initiate measures to prevent increased intracranial pressure  - Maintain blood pressure and fluid volume within ordered parameters to optimize cerebral perfusion and minimize risk of hemorrhage  - Monitor temperature, glucose, and sodium. Initiate appropriate interventions as ordered  Outcome: Adequate for Discharge     Problem: Impaired Swallowing  Goal: Minimize aspiration risk  Description: Interventions:  - Patient should be alert and upright for all feedings (90 degrees preferred)  - Offer food and liquids at a slow rate  - No straws  - Encourage small bites of food and small sips of liquid  - Offer pills one at a time, crush or deliver with applesauce as needed  - Discontinue feeding and notify MD (or speech pathologist) if coughing or persistent throat clearing or wet/gurgly vocal quality is noted  Outcome: Adequate for Discharge     Problem: Risk for Violence/Aggression  Goal: Absence of Violence/Aggression  Description: INTERVENTIONS:   - Identify precipitating factors for behavior   - Notify Charge RN/Provider   - Consider decreasing stimulation   - Consider distraction measures   - Consider discussion with provider regarding prn meds    -  Consider ROXANNE (Moderate Risk only)   - Consider Code Support (High Risk only)   - Consider room safety checks   - Consider restraints  Outcome: Adequate for Discharge

## 2024-07-23 NOTE — DISCHARGE SUMMARY
[facility]  part of [facility] Discharge Summary   Patient ID:  Cole Eric  [id number]  78 year old  9/13/1945    Admit date: 7/17/2024    Discharge date and time: 7/23/24    Attending Physician: Dominique Jarvis MD     Primary Care Physician: Irene Cedillo MD     Reason for admission KIMBERLEY, CKD, deydration  (see HPI on HP for further detail)    Discharged Condition:  to memory care unit with hospice     Disposition: SNF    Risk of Readmission Lace+ Score: 43  59-90 High Risk  29-58 Medium Risk  0-28   Low Risk    Important follow up:   Hop[facility] at [facility]   Discharge meds  I reviewed and reconciled current and discharge medications on the day of discharge with the changes reflected below.       Medication List        START taking these medications      cefdinir 300 MG Caps  Commonly known as: Omnicef  Take 1 capsule (300 mg total) by mouth daily.     hydrocortisone 1 % Crea  Apply 1 Application topically 2 (two) times daily for 7 days.     hydrOXYzine 25 MG Tabs  Commonly known as: Atarax  Take 1 tablet (25 mg total) by mouth 3 (three) times daily as needed for Itching.            CONTINUE taking these medications      acetaminophen 325 MG Tabs  Commonly known as: Tylenol     albuterol 108 (90 Base) MCG/ACT Aers  Commonly known as: Ventolin HFA     aspirin 81 MG Tbec     dorzolamide-timolol 2-0.5 % Soln  Commonly known as: Cosopt     fexofenadine 60 MG Tabs  Commonly known as: Allegra     fluticasone furoate-vilanterol 100-25 MCG/ACT Aepb  Commonly known as: Breo Ellipta     hydrALAZINE 50 MG Tabs  Commonly known as: Apresoline     melatonin 5 MG Caps     memantine 5 MG Tabs  Commonly known as: Namenda            STOP taking these medications      amLODIPine 5 MG Tabs  Commonly known as: Norvasc     apixaban 5 MG Tabs  Commonly known as: Eliquis     atorvastatin 10 MG Tabs  Commonly known as: Lipitor     colchicine 0.6 MG Tabs     dapagliflozin 5 MG  Tabs  Commonly known as: Farxiga     levoFLOXacin 750 MG Tabs  Commonly known as: Levaquin     losartan 100 MG Tabs  Commonly known as: Cozaar     tamsulosin 0.4 MG Caps  Commonly known as: Flomax     traZODone 50 MG Tabs  Commonly known as: Desyrel               Where to Get Your Medications        You can get these medications from any pharmacy    Bring a paper prescription for each of these medications  cefdinir 300 MG Caps  hydrocortisone 1 % Crea  hydrOXYzine 25 MG Tabs       HPI per chart  Mr. Eric is a 77 yo M with PMH of dementia, DM2, CKD3, HTN, CVA who presented with abnormal labs. History obtained from family and chart review due to patient's mental status. Wife states he has been more fatigued lately. SNF noticed worsening kidney function and elevated K and recommended ER evaluation. Patient has lived at LTC facility for almost a year now. He has been on thickened liquids for a while but he does not like drinking the thickened water so wife usually brings him water when she visits every other day. Wife states he was more alert yesterday compared to today. No fevers. No GI bleeding, bloody or black stools that staff has noticed.     In the ED, vitals stable, labs significant for Cr 4.6, , initial K 6.7 but repeat 4.7 without intervention, Hg 9.5, WBC 16. UA with dark cloudy urine, started on ceftriaxone. Renal consulted.   Hospital Course  Mr. Eric is a 77 yo M with PMH of dementia, DM2, CKD3, HTN, CVA who presented with abnormal labs, found to have KIMBERLEY on CKD.  Worsening hypoxia noted due to fluid overload.  Patient with progression of chronic kidney disease.  Monitor response to Lasix but suspect may worsen renal function.  Patient is a poor dialysis candidate.  Will continue to discuss goals of care with family.  Family to come today around 2 PM.  Discussed with son at bedside and likely to pick hospice at his memory care facility if possible.  Family cannot provide home care due to baseline  dementia issues.  Decision made to pursue hospice.  Will enroll tomorrow on 7/23/2024 at [facility] as he is a permanent resident of the memory care unit.  Family in agreement with plan.  Nephrology updated.   Discharge Diagnoses:   KIMBERLEY on CKD4 vs. Progression of CKD  Hyperkalemia  Seen by renal  No further labs as pt is now hospice  Mildy improvement at dc    Hypoxia  -down to 3L, not HD candidate, O2 as needed  -XR with fluid overload, mild diuresis     Anemia  -worsening overall but stable here, no signs of active bleeding  -aspirin only at dc as pt now hospice    UTI  -complete course with po   -leukocytosis resolved    Type 2 DM  -no further checks needed    CVA, can cont aspirin    DVT hx  -stopped eliquis due to anemia and GOC    Vascular Dementia  - lives at [facility]  Consults: IP CONSULT TO NEPHROLOGY  IP CONSULT PALLIATIVE CARE  IP CONSULT TO SOCIAL WORK    Radiology: XR CHEST AP PORTABLE  (CPT=71045)    Result Date: 7/19/2024  PROCEDURE: XR CHEST AP PORTABLE  (CPT=71045) TIME: 9:05.   COMPARISON: [facility], [place], XR CHEST AP PORTABLE (CPT=71045), 12/04/2023, 1:28 PM.  [facility], [place], CTA CHEST+CTA ABDOMEN DISSECT SET (CPT=71275/10858), 9/24/2023, 11:46 PM.  [facility], [place], XR CHEST AP PORTABLE (CPT=71045), 7/17/2024, 5:49 PM.  INDICATIONS: Hypoxia.  Acute on chronic kidney injury, urinary tract infection.  TECHNIQUE:   Single view.   FINDINGS:  CARDIAC/VASC: The cardiac silhouette is not enlarged.  There has been development of central pulmonary venous congestion.  MEDIAST/WARREN:   No visible mass or adenopathy. LUNGS/PLEURA: Perihilar predominant interstitial opacities have developed throughout both lungs.  There is no pleural effusion or pneumothorax. BONES: The osseous structures are unchanged. OTHER: Negative.          CONCLUSION:  Development of findings that are most consistent with moderate CHF/fluid overload including pulmonary edema.    Dictated by (CST):  Hmuberto Macdonald MD on 7/19/2024 at 9:32 AM     Finalized by (CST): Humberto Macdonald MD on 7/19/2024 at 9:34 AM          US KIDNEY/BLADDER (CPT=76770)    Result Date: 7/18/2024  PROCEDURE: US KIDNEY/BLADDER (CPT=76770)  COMPARISON: [facility], CTA CHEST+CTA ABDOMEN DISSECT SET (CPT=71275/96535), 9/24/2023, 11:46 PM.  [facility], US KIDNEYS (CPT=76775), 3/20/2023, 1:53 PM.  INDICATIONS: tello on CKD  TECHNIQUE: Ultrasound examination was performed to visualize the kidneys and bladder.   FINDINGS:  Limited examination due to patient mobility limitations/difficulty following breathing instructions.    RIGHT KIDNEY: Right kidney length is 7.7 cm, previously 9.34 cm.  No hydronephrosis or obstructing renal stone.  No perinephric fluid.  Diffusely atrophied kidney with diffusely echogenic renal cortex LEFT KIDNEY: Left kidney length is 11.3 cm, previously 11.43 cm.  No hydronephrosis or obstructing renal stone.  No perinephric fluid.  No discrete renal mass.  No hyperemia.  Apparent prominent diffuse expansion of the left renal cortex.  CONCLUSION:   No hydronephrosis or obstructing renal stone.  Small, atrophied right kidney.  Diffuse prominence of the left renal cortex size, favored to represent physiologic hypertrophy in response to chronic right renal atrophy.  Less likely differential is diffuse renal edema, with etiologies including pyelonephritis, renal vein thrombus, or  other causes in the appropriate clinical setting         DICTATED BY (CST): HOMERO DURHAM MD ON 7/18/2024 AT 4:27 PM     FINALIZED BY (CST): HOMERO DURHAM MD ON 7/18/2024 AT 4:32 PM             XR CHEST AP PORTABLE  (CPT=71045)    Result Date: 7/17/2024  PROCEDURE: XR CHEST AP PORTABLE  (CPT=71045) TIME: 5:53 p.m.   COMPARISON: [facility], XR CHEST AP PORTABLE (CPT=71045), 12/04/2023, 1:28 PM.  INDICATIONS: Abnormal labs  TECHNIQUE:   Single view.   FINDINGS:  CARDIAC/VASC: No cardiac  silhouette abnormality or cardiomegaly.  Unremarkable pulmonary vasculature.  MEDIAST/WARREN:   Atherosclerotic aorta with no visible aneurysm.  LUNGS/PLEURA: Patient's arm overlies the lung bases on 1 of the images.  Repeat imaging was performed with the arm removed from the lung region, however the repeat image does not include portions of the right lateral hemithorax.  No significant pulmonary  parenchymal abnormalities.  No effusion or pleural thickening. BONES: Scattered mild degenerative endplate changes in the visualized thoracolumbar spine. OTHER: Right upper extremity PICC with tip the axilla/axillary vein.         CONCLUSION: No acute cardiopulmonary abnormality.     Dictated by (CST): Jacinto Villarreal MD on 7/17/2024 at 6:07 PM     Finalized by (CST): Jacinto Villarreal MD on 7/17/2024 at 6:08 PM             Operative Procedures:      Day of discharge no complaints, wants to go. Tries to wiggle out of bed.  No restraints or IV meds needed     Exam  Vitals:    07/23/24 0409   BP: (!) 131/96   Pulse: 82   Resp: 18   Temp: 97.5 °F (36.4 °C)     No acute distress, alert and orientedxself only   Lungs Clear  Heart Regular  Abdomen Benign    Total Time Coordinating Care: > than 30 minutes  Note: This chart was prepared using voice recognition software and may contain unintended word substitution errors.     Patient had opportunity to ask questions and state understand and agree with therapeutic plan as outlined

## 2024-07-23 NOTE — PROGRESS NOTES
[facility] reviewed [facility] plan and has no additional suggestions for management of patient behaviors. Treatment team can consult psychiatry for medication management if behavioral interventions prove insufficient.       Suyapa EDWARDS, SHAHIDAW  [phone]

## 2024-07-23 NOTE — PLAN OF CARE
Patient is alert to self. RA. Voiding freely. Max assist. Patient verbal and physically aggressive trying to kick staff and swearing at staff. Call light within reach, bed alarm active. Camera monitoring in place for patient safety.  Problem: Patient Centered Care  Goal: Patient preferences are identified and integrated in the patient's plan of care  Description: Interventions:  - What would you like us to know as we care for you? [facility]  - Provide timely, complete, and accurate information to patient/family  - Incorporate patient and family knowledge, values, beliefs, and cultural backgrounds into the planning and delivery of care  - Encourage patient/family to participate in care and decision-making at the level they choose  - Honor patient and family perspectives and choices  Outcome: Progressing     Problem: Diabetes/Glucose Control  Goal: Glucose maintained within prescribed range  Description: INTERVENTIONS:  - Monitor Blood Glucose as ordered  - Assess for signs and symptoms of hyperglycemia and hypoglycemia  - Administer ordered medications to maintain glucose within target range  - Assess barriers to adequate nutritional intake and initiate nutrition consult as needed  - Instruct patient on self management of diabetes  Outcome: Progressing     Problem: SKIN/TISSUE INTEGRITY - ADULT  Goal: Skin integrity remains intact  Description: INTERVENTIONS  - Assess and document risk factors for pressure ulcer development  - Assess and document skin integrity  - Monitor for areas of redness and/or skin breakdown  - Initiate interventions, skin care algorithm/standards of care as needed  Outcome: Progressing     Problem: NEUROLOGICAL - ADULT  Goal: Achieves stable or improved neurological status  Description: INTERVENTIONS  - Assess for and report changes in neurological status  - Initiate measures to prevent increased intracranial pressure  - Maintain blood pressure and fluid volume within ordered  parameters to optimize cerebral perfusion and minimize risk of hemorrhage  - Monitor temperature, glucose, and sodium. Initiate appropriate interventions as ordered  Outcome: Progressing     Problem: Impaired Swallowing  Goal: Minimize aspiration risk  Description: Interventions:  - Patient should be alert and upright for all feedings (90 degrees preferred)  - Offer food and liquids at a slow rate  - No straws  - Encourage small bites of food and small sips of liquid  - Offer pills one at a time, crush or deliver with applesauce as needed  - Discontinue feeding and notify MD (or speech pathologist) if coughing or persistent throat clearing or wet/gurgly vocal quality is noted  Outcome: Progressing     Problem: Risk for Violence/Aggression  Goal: Absence of Violence/Aggression  Description: INTERVENTIONS:   - Identify precipitating factors for behavior   - Notify Charge RN/Provider   - Consider decreasing stimulation   - Consider distraction measures   - Consider discussion with provider regarding prn meds    - Consider ROXANNE (Moderate Risk only)   - Consider Code Support (High Risk only)   - Consider room safety checks   - Consider restraints  Outcome: Progressing

## 2024-07-23 NOTE — CM/SW NOTE
SW was informed that patient is discharging back to BTL under residential Hospice. [facility] scheduled for pt to be picked up for today at 915am. RN called ALISHA for report number. RN to call report to  [facility] [phone]    SW/CM to remain available for support and/or discharge planning.     Anahi Sutton, MSW, LSW   x [phone]

## 2024-07-30 NOTE — CDS QUERY
Dear Dr Jarvis,  Can status of Acute respiratory failure be clarified?  (x ) Acute respiratory failure, hypoxic is confirmed  ( )  ruled out  ( ) other- please specify:_________________________    Clinical indicators:SOB, increase dyspnea, Worsening hypoxia,worsening crackles  7/19: SPO2 85% room air, 91-93% 7L-5L High flow Oxygen - P/F ratio 133  7/20-7/21: SPO2 87% on room air, 89% on 3L HFNC, 92-94- 99% 5L-3L-4L HFNC -P/F ratio 184  7/22-7/23: SPO2 % on room air   Chest xray 7/19: moderate CHF/fluid overload including pulmonary edema.  Nephrology notes 7/19-7/20: Acute hypoxic respiratory failure Volume overload / pulm edema  DC summary: Worsening hypoxia noted due to fluid overload   Risk factors: KIMBERLEY on CKD 4, Hyperkalemia, dehydration, dementia     Treatment: Stop IV fluid, IV lasix 40Mg, requiring supplement oxygen    If you have any questions, please contact Clinical Documentation  Specialist:  SAUMYA Godinez at [phone]     Thank You!     THIS FORM IS A PERMANENT PART OF THE MEDICAL RECORD

## 2025-06-16 NOTE — PHYSICAL THERAPY NOTE
PHYSICAL THERAPY TREATMENT NOTE - INPATIENT    Room Number: 546/814-Q       Presenting Problem: weakness, confusion    Problem List  Principal Problem:    Sepsis, due to unspecified organism [facility])  Active Problems:    Sepsis ([facility] [address]    Altered mental status -   Sitting down on and standing up from a chair with arms (e.g., wheelchair, bedside commode, etc.): A Little   -   Moving from lying on back to sitting on the side of the bed?: A Little   How much help from another person does the patient currently nee given verbal and demo cues for therex in sitting and stance. Family educated on the benefits of increased activity.     Goal #6     Goal #6  Current Status   51-year-old female  presents with recurrence of urinary retention.  Ballesteros placed.  1100 cc of urine removed.  Patient feels much better.  Laboratories unremarkable.  Urinalysis shows no evidence of infection.  Patient did not want to keep Ballesteros catheter in.  She is encouraged to follow-up with her specialist which she has followed up with previously including a urologist.
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
## (undated) NOTE — LETTER
Important: This notice explains your right to appeal our decision. Read this notice carefully. If you need help, you can call on of the numbers listed on the last page under “Get help and more information”.     Notice of Denial of Payment   Date:  4/7/20 within 30 days after we get your appeal.  Our decision might take longer if you ask for an extension, or if we need more information about your case. We’ll tell you if we’re taking extra time and will explain why more time is needed.   If your appeal is fo of a service, we’ll send you a written decision and automatically send your case to an independent reviewer. If the independent reviewer denies your request, the written decision will explain if you have additional appeal rights.     Get help and more info help filing a grievance, Customer Service is available to help you. You can also file a civil rights complaint with the [place].  Department of Health and Human Services, Office for [employer], electronically through the Office for Civil Rights Complaint

## (undated) NOTE — IP AVS SNAPSHOT
[facility]            (For Outpatient Use Only) Initial Admit Date: 2022   Inpt/Obs Admit Date: Inpt: 22 / Obs: N/A   Discharge Date:    Randi Drilling:  [de-identified]   MRN: [de-identified]   CSN: [id number]   CEID: [id number]        ENCOUNTER  Patient Class: Inpatient Admitting Provider: Isai Carvalho MD Unit: [address]   Hospital Service: Medical Attending Provider: Isai Carvalho MD   Bed: 554-A   Visit Type:   Referring Physician: No ref. provider found Billing Flag:    Admit Diagnosis: Weakness generalized [R53.1]      PATIENT  Legal Name:   Maximilian Henley    Legal Sex: Male  Gender ID:              Pref Name:    PCP:  Amarilys Seay: [phone]   Address:  [address] [place] [address] [address] : 1945 (77 yrs) Mobile: No mobile phone on file. City[facility] [id number] Marital:  Language: [facility] [address]: [employer] SSN4: [id number] Synagogue: [address]     Race: Black Or  Ethnicity: Non  Or  O*   EMERGENCY CONTACT   Name Relationship Legal Guardian? Home Phone Work Phone Mobile Phone   1. Fernandez Eric  2. Christians,Martees Spouse  Daughter          [phone]     GUARANTOR  Guarantor: ROGERS ERIC : 1945 Home Phone: [phone]   Address: [address] [place] [address] [address]  Sex:  Male Work Phone:    [facility], [address]   Rel. to Patient: Self Guarantor ID: [id number]   GUARANTOR EMPLOYER   Employer:  Status: RETIRED     COVERAGE  PRIMARY INSURANCE   Payor: MEDICARE Plan: MEDICARE PART A&B   Group Number:  Insurance Type: INDEMNITY   Subscriber Name: Margret Gillespie : 1945   Subscriber ID: [id number] Pt Rel to Subscriber: Self   SECONDARY INSURANCE   Payor: HMO MEDICAID Plan: Ann Bobo FHP/ICP   Group Number: [id number] Insurance Type: INDEMNITY   Subscriber Name: Margret Gillespie : 1945   Subscriber ID: [id number] Pt Rel to Subscriber: SELF   TERTIARY INSURANCE   Payor:  Plan:    Group Number:  Insurance Type:    Subscriber Name:  Subscriber :    Subscriber ID:  Pt Rel to Subscriber:    Hospital Account Financial Class: Medicare    2022

## (undated) NOTE — IP AVS SNAPSHOT
[facility]            (For Outpatient Use Only) Initial Admit Date: 1/4/2022   Inpt/Obs Admit Date: Inpt: N/A / Obs: 01/04/22   Discharge Date:    Hospital Acct:  [de-identified]   MRN: [de-identified]   CSN: [id number]   CEID: [id number]        OQK Insurance Type:    Subscriber Name:  Subscriber :    Subscriber ID:  Pt Rel to Subscriber:    Hospital Account Financial Class: Medicare    2022

## (undated) NOTE — LETTER
Date & Time: 9/7/2021, 6:10 PM  Patient: Asha Knight  Encounter Provider(s):    Marcelline Bruns, MD Sheryle Labrador, MD       To Whom It May Concern:    Asha Knight was seen and treated in our department on 9/7/2021. Please excuse his daughter, Dianne Pitt, from being absent from work today.      If you have any questions or concerns, please do not hesitate to call.        _____________________________  RN for MD

## (undated) NOTE — IP AVS SNAPSHOT
Patient Demographics     Address  [address] [place] [address] Phone  [phone] [facility])      Emergency Contact(s)     Name Relation Home Work [address] Spouse   [phone]    Veronika,Lilibeth Daughter   [phone] known as: FLOMAX  Next dose due: Tomorrow morning      Take 0.4 mg by mouth daily. Trelegy Ellipta 100-62.5-25 MCG/INH Aepb  Generic drug: fluticasone-umeclidin-vilant  Next dose due:  Tomorrow morning      INHALE 1 PUFF INTO THE LUNGS DAILY   SOHAM 1608    Order Status: Completed Lab Status: Final result Updated: 01/04/22 9022    Specimen: Other from Nares      SARS-CoV-2 (COVID-19) - (GeneXpert) Detected     Influenza A by PCR Negative     Influenza B by PCR Negative     RSV by PCR Negative    Brenton Vaughn of Systems  Comprehensive ROS reviewed and negative except for what's stated above.      PMH  dementia, COPD, CKD stage 4, GERD, Gout, hyperlipidemia, history of stroke, history of VTE (DVT)    PSH  Past Surgical History:   Procedure Laterality Date   • LEIGHANN microvascular ischemic disease. 4. There is large vessel atherosclerosis involving the anterior and posterior intracranial circulations. 5. Lesser incidental findings as above.       Dictated by (CST): Asya Madison MD on 1/04/2022 at 4:55 PM     [address] : Alex Irvin DO (Physician)    Related Notes: Addendum by Alex Irvin DO (Physician) filed at 1/4/2022  6:05 PM         [facility] Hospitalist H&P       CC: Fatigue    PCP: KIAN ABREU    History of Present Illness:   77year old ma 5/5 strength in bilateral extremities    Diagnostic Data:    CBC/Chem  Recent Labs   Lab 01/04/22  1609   WBC 3.6*   HGB 13.0   MCV 89.4   .0       Recent Labs   Lab 01/04/22  1609      K 3.1*      CO2 21.0   BUN 26*   CREATSERUM 1.76* meds at baseline  -no need for accuchecks    History of stroke  -noted    Reviewed prior notes and records. Patient would not want feeding tube.  DNR/Select in past so will continue again this admission     Fluids: D5 0.45 with K at 75cc/hr  Diet: general stopped   -no indication for covid specific therapeutics     Oral thrush  -nystatin suspension QID for 7 days (1/6-)     Dementia  -noted, high risk of delirium in hospital but did not see signs of delirium here      CKD stage 4  -stable Cr, improved from 1/7/2022  5:11 PM              Physical Therapy Notes (last 72 hours)      Physical Therapy Note signed by Twanda Brunner, PT, DPT [facility] at 1/5/2022  1:20 PM  Version 1 of 1    Author: Twanda Brunner, PT, DPT [facility] Service: Rehab Author Type: Physical Therapis deficits in preparation for discharge. DISCHARGE RECOMMENDATIONS  PT Discharge Recommendations: Sub-acute rehabilitation (PT)    PLAN  PT Treatment Plan: Bed mobility; Body mechanics; Endurance; Energy conservation;Patient education;Gait training;Strengthe Status:  WFL - within functional limits    RANGE OF MOTION AND STRENGTH ASSESSMENT  Upper extremity ROM and strength are within functional limits LUE 3-/5, RUE 4/5  Lower extremity ROM is within functional limits   Lower extremity strength is within functi of Session: In bed; With [facility] staff;RN aware of session/findings;Call light within reach; Needs met; All patient questions and concerns addressed; Alarm set    CURRENT GOALS    Goals to be met by: 1/30/2022  Patient Goal Patient's self-stated goal is: Return hme for safety. Pt performed sit to stand with min assist, to transfer 4 ft to chair . Cues needed for safety , pt tending to not want to use RW , and to 'furniture walk' cues needed for safety . Limited insight noted into deficits.    Pt was dressed, at start or urinal? : A Lot  -   Putting on and taking off regular upper body clothing?: A Little  -   Taking care of personal grooming such as brushing teeth?: A Little  -   Eating meals?: A Little    AM-PAC Score:  Score: 15  Approx Degree of Impairment: 56.46% encouragement.  Pt did wash face with set up       Goals  on: 22  Frequency: 3x/week         Occupational Therapy Note signed by Chris Alston at 2022  3:50 PM  Version 1 of 1    Author: Nieves Thurston OT Service: — Author Type: Occ seated at EOB: O2 = 95% (RA), HR = 82 bpm, BP = 178/52. Patient declined to complete ADLs or transfer to chair at this time despite encouragement. Patient denied pain/dizziness throughout, reported mod SOB with bed mobility.  Patient supine in bed at end of vascular disease) ([facility] [address]     pvd   • Stroke [facility])        Past Surgical History  Past Surgical History:   Procedure Laterality Date   • BACK SURGERY     • CATH BARE METAL STENT (BMS)         HOME SITUATION (obtained via chart)  Home Situation  Type of Home: 15  Approx Degree of Impairment: 56.46%  Standardized Score (AM-PAC Scale): 34.69  CMS Modifier (G-Code): CK    FUNCTIONAL TRANSFER ASSESSMENT  Supine to Sit : Moderate assistance  Sit to Stand: Not tested    BALANCE ASSESSMENT  Static Sitting: Poor  Dynam GERD, Gout, hyperlipidemia, hypertension, history of stroke, history of VTE (DVT), who presents to the hospital for evaluation of weakness, decreased PO intake, failure to thrive. Patient unable to give history but able to answer some questions.  Apparently diet with mildly thick liquids. Recommend f/u to assess candidacy for diet upgrade or need for objective VFSS. Discussed results of exam, diet recommendations, aspiration precautions and plan with patient who reported good understanding.  Informed RN Regular; Thin liquids  Dysphagia History: none reported or learned from chart review. None at [facility]  Imaging Results:   Chest x-ray completed 1/4/21:  CONCLUSION:   1.  Mild multifocal pneumonitis versus atypical viral pneumonia     CT of the brain completed signs or symptoms of aspiration with 90 % accuracy over 2 session(s).   In Progress   Goal #2 The patient will tolerate trial upgrade of soft and bite size diet consistency and thin liquids without overt signs or symptoms of aspiration with 100 % accuracy o

## (undated) NOTE — IP AVS SNAPSHOT
[facility]            (For Outpatient Use Only) Initial Admit Date: 2021   Inpt/Obs Admit Date: Inpt: 21 / Obs: 21   Discharge Date:    Rowdy Glasgow:  [de-identified]   MRN: [de-identified]   CSN: [id number]   CEID: [id number]        ENCOUNTER  Patient Class: Inpatient Admitting Provider: Mindy Catalan MD Unit: [address] [place] [address]   Hospital Service: Medical Attending Provider: Talia Gómez MD   Bed: 557-A   Visit Type:   Referring Physician: No ref. provider found Billing Flag:    Admit Diagnosis: Hypoglycemia [E16.2]      PATIENT  Legal Name:   Chaim Blanco    Legal Sex: Male  Gender ID:              Pref Name:    PCP:  Joselito Hernández: [phone]   Address:  [address] [place] [address] : 1945 (75 yrs) Mobile: No mobile phone on file. City/State/Zip: [place] [address] Marital:  Language: [place]: Lj SSN4: [id number] Jainism: [address]     Race: Black Or  Ethnicity: Non  Or  O*   EMERGENCY CONTACT   Name Relationship Legal Guardian? Home Phone Work Phone Mobile Phone   1. Fernandez Eric  2. Christians,Martees Spouse  Daughter          [phone]     GUARANTOR  Guarantor: VEENAROGERS : 1945 Home Phone: [phone]   Address: [address] [place] [address]  Sex:  Male Work Phone:    City/State/Zip: [address] [place] [address]   Rel. to Patient: Self Guarantor ID: [id number]   GUARANTOR EMPLOYER   Employer:  Status: RETIRED     COVERAGE  PRIMARY INSURANCE   Payor: MEDICARE Plan: MEDICARE PART A&B   Group Number:  Insurance Type: INDEMNITY   Subscriber Name: Cary Carrero : 1945   Subscriber ID: [id number] Pt Rel to Subscriber: Self   SECONDARY INSURANCE   Payor: MEDICAID Plan: MEDICAID   Group Number: [id number] Insurance Type: [address] Name: Cary Carrero : 1945   Subscriber ID: [id number] Pt Rel to Subscriber: SELF   TERTIARY INSURANCE   Payor:  Plan: Group Number:  Insurance Type:    Subscriber Name:  Subscriber :    Subscriber ID:  Pt Rel to Subscriber:    Hospital Account Financial Class: Medicare    September 10, 2021

## (undated) NOTE — LETTER
[address] [facility] [address]     I agree to have a Peripherally Inserted Central Catheter (PICC) placed in my arm.      1. The PICC insertion procedure, care, maintenance, risks, benefits, and complications Statement of Physician: My signature below affirms that prior to the time of the PICC line insertion, I have explained to the patient and/or his/her legal representative, the risks and benefits involved in the proposed treatment and any reasonable alternat

## (undated) NOTE — IP AVS SNAPSHOT
[address] [facility] [address]  [address] [facility] [phone]                Discharge Summary   4/5/2017    Jovani Harrison           Admission Information        Provider Department    4/5/2017 Marni Alegria MD [facility] 5sw/Se Last time this was given:  10 mg on 4/12/2017  9:26 AM   Commonly known as:  LIPITOR   Next dose due: Tomorrow morning. Take 10 mg by mouth daily.                             Clopidogrel Bisulfate 75 MG Tabs   Last time this was given:  75 mg on 4/1 Why:  You requested that your wife makes your next appointment.   Please call Dr. Antony Mccollum within 2-3 days after discharge for a diabetes management follow-up appointment in 1-2 weeks    Contact information:    [address]  [phone] ALT Bilirubin,Total Total Protein Albumin Sodium Potassium Chloride    (04/08/17)  12 (L) (04/08/17)  1.3 (H) (04/08/17)  5.8 (L) (04/12/17)  2.8 (L) (04/12/17)  140 (04/12/17)  3.7 (04/12/17)  108      Pending Labs     Order Current Status    BLOOD CULTU Expires: 6/5/2017 12:26 PM    If you have questions, you can call [phone] to talk to our [facility] Staff. Remember, MyChart is NOT to be used for urgent needs.   For medical emergencies, dial 911.             ___________________________________ Other Blood Thinners     Platelet Aggregation Inhibitors    Clopidogrel Bisulfate 75 MG Oral Tab       Use: Prevent the development or progression of blood clots   Most common side effects: Abnormal bleeding   What to report to your healthcare team: Brett Enlarged Prostate Medications     Prostatic Hypertrophy Agents    tamsulosin HCl 0.4 MG Oral Cap       Use: Improve urine flow that has become difficult because of an enlarged prostate   Most common side effects: Impotence, decreased libido, low blood pres